# Patient Record
Sex: MALE | Race: WHITE | NOT HISPANIC OR LATINO | Employment: OTHER | URBAN - METROPOLITAN AREA
[De-identification: names, ages, dates, MRNs, and addresses within clinical notes are randomized per-mention and may not be internally consistent; named-entity substitution may affect disease eponyms.]

---

## 2017-05-12 ENCOUNTER — OFFICE VISIT (OUTPATIENT)
Dept: FAMILY MEDICINE | Facility: CLINIC | Age: 62
End: 2017-05-12
Payer: COMMERCIAL

## 2017-05-12 VITALS
SYSTOLIC BLOOD PRESSURE: 182 MMHG | WEIGHT: 194.5 LBS | HEART RATE: 66 BPM | DIASTOLIC BLOOD PRESSURE: 100 MMHG | TEMPERATURE: 98.6 F | BODY MASS INDEX: 26.34 KG/M2 | HEIGHT: 72 IN | OXYGEN SATURATION: 100 %

## 2017-05-12 DIAGNOSIS — Z11.59 NEED FOR HEPATITIS C SCREENING TEST: ICD-10-CM

## 2017-05-12 DIAGNOSIS — Z13.220 SCREENING FOR HYPERLIPIDEMIA: ICD-10-CM

## 2017-05-12 DIAGNOSIS — R97.20 ELEVATED PROSTATE SPECIFIC ANTIGEN (PSA): ICD-10-CM

## 2017-05-12 DIAGNOSIS — Z12.5 SPECIAL SCREENING FOR MALIGNANT NEOPLASM OF PROSTATE: ICD-10-CM

## 2017-05-12 DIAGNOSIS — Z00.00 ROUTINE GENERAL MEDICAL EXAMINATION AT A HEALTH CARE FACILITY: Primary | ICD-10-CM

## 2017-05-12 DIAGNOSIS — I10 ESSENTIAL HYPERTENSION WITH GOAL BLOOD PRESSURE LESS THAN 140/90: ICD-10-CM

## 2017-05-12 LAB
ANION GAP SERPL CALCULATED.3IONS-SCNC: 9 MMOL/L (ref 3–14)
BUN SERPL-MCNC: 13 MG/DL (ref 7–30)
CALCIUM SERPL-MCNC: 8.9 MG/DL (ref 8.5–10.1)
CHLORIDE SERPL-SCNC: 100 MMOL/L (ref 94–109)
CHOLEST SERPL-MCNC: 181 MG/DL
CO2 SERPL-SCNC: 24 MMOL/L (ref 20–32)
CREAT SERPL-MCNC: 0.9 MG/DL (ref 0.66–1.25)
GFR SERPL CREATININE-BSD FRML MDRD: 86 ML/MIN/1.7M2
GLUCOSE SERPL-MCNC: 113 MG/DL (ref 70–99)
HDLC SERPL-MCNC: 51 MG/DL
LDLC SERPL CALC-MCNC: 104 MG/DL
NONHDLC SERPL-MCNC: 130 MG/DL
POTASSIUM SERPL-SCNC: 3.9 MMOL/L (ref 3.4–5.3)
PSA SERPL-ACNC: 5.59 UG/L (ref 0–4)
SODIUM SERPL-SCNC: 133 MMOL/L (ref 133–144)
TRIGL SERPL-MCNC: 132 MG/DL

## 2017-05-12 PROCEDURE — 86803 HEPATITIS C AB TEST: CPT | Performed by: FAMILY MEDICINE

## 2017-05-12 PROCEDURE — 99396 PREV VISIT EST AGE 40-64: CPT | Performed by: FAMILY MEDICINE

## 2017-05-12 PROCEDURE — 80048 BASIC METABOLIC PNL TOTAL CA: CPT | Performed by: FAMILY MEDICINE

## 2017-05-12 PROCEDURE — 36415 COLL VENOUS BLD VENIPUNCTURE: CPT | Performed by: FAMILY MEDICINE

## 2017-05-12 PROCEDURE — 80061 LIPID PANEL: CPT | Performed by: FAMILY MEDICINE

## 2017-05-12 PROCEDURE — G0103 PSA SCREENING: HCPCS | Performed by: FAMILY MEDICINE

## 2017-05-12 PROCEDURE — 99213 OFFICE O/P EST LOW 20 MIN: CPT | Mod: 25 | Performed by: FAMILY MEDICINE

## 2017-05-12 RX ORDER — ATENOLOL 100 MG/1
100 TABLET ORAL DAILY
Qty: 90 TABLET | Refills: 1 | Status: SHIPPED | OUTPATIENT
Start: 2017-05-12 | End: 2017-11-08

## 2017-05-12 RX ORDER — LISINOPRIL AND HYDROCHLOROTHIAZIDE 12.5; 2 MG/1; MG/1
2 TABLET ORAL EVERY MORNING
Qty: 180 TABLET | Refills: 1 | Status: SHIPPED | OUTPATIENT
Start: 2017-05-12 | End: 2017-11-08

## 2017-05-12 RX ORDER — AMLODIPINE BESYLATE 5 MG/1
5 TABLET ORAL DAILY
Qty: 30 TABLET | Refills: 1 | Status: SHIPPED | OUTPATIENT
Start: 2017-05-12 | End: 2017-07-09

## 2017-05-12 NOTE — PROGRESS NOTES
SUBJECTIVE:     CC: Dion Bowman is an 61 year old male who presents for preventative health visit.     Physical   Annual:     Getting at least 3 servings of Calcium per day::  Yes    Bi-annual eye exam::  Yes    Dental care twice a year::  Yes    Sleep apnea or symptoms of sleep apnea::  None    Diet::  Regular (no restrictions)    Frequency of exercise::  6-7 days/week    Duration of exercise::  30-45 minutes    Taking medications regularly::  Yes    Medication side effects::  None    Additional concerns today::  YES (measles question, should he get vaccinated?)  and colonoscopy referral and would like to discuss 24 hour blood pressure from last fall.     Today's PHQ-2 Score:   PHQ-2 ( 1999 Pfizer) 5/12/2017   Q1: Little interest or pleasure in doing things -   Q2: Feeling down, depressed or hopeless -   PHQ-2 Score -   Little interest or pleasure in doing things Not at all   Feeling down, depressed or hopeless Not at all   PHQ-2 Score 0     Abuse: Current or Past(Physical, Sexual or Emotional)- No  Do you feel safe in your environment - Yes    Social History   Substance Use Topics     Smoking status: Never Smoker     Smokeless tobacco: Never Used     Alcohol use Yes      Comment: 1 drink every two weeks. During Football Season     The patient does not drink >3 drinks per day nor >7 drinks per week.    Last PSA:   PSA   Date Value Ref Range Status   12/20/2013 3.01 0 - 4 ug/L Final     Recent Labs   Lab Test  12/18/15   0856  12/23/14   1218  12/20/13   0833   CHOL  187  196  195   HDL  50  52  40   LDL  111*  111  115   TRIG  132  163*  199*   CHOLHDLRATIO   --   3.8  4.8   NHDL  137*   --    --      Reviewed orders with patient. Reviewed health maintenance and updated orders accordingly - Yes    Reviewed and updated as needed this visit by clinical staff.     Reviewed and updated as needed this visit by Provider    Had ambulatory blood pressure monitoring. It was expensive. When he used it, he was constantly  "aware of it and he feels it may have raised his BP too.     ROS:  C: NEGATIVE for fever, chills, change in weight  I: NEGATIVE for worrisome rashes, moles or lesions  E: NEGATIVE for vision changes or irritation  ENT: NEGATIVE for ear, mouth and throat problems  R: NEGATIVE for significant cough or SOB  CV: NEGATIVE for chest pain, palpitations or peripheral edema  GI: NEGATIVE for nausea, abdominal pain, heartburn, or change in bowel habits   male: negative for dysuria, hematuria, decreased urinary stream, erectile dysfunction, urethral discharge  M: NEGATIVE for significant arthralgias or myalgia  N: NEGATIVE for weakness, dizziness or paresthesias  E: NEGATIVE for temperature intolerance, skin/hair changes  P: NEGATIVE for changes in mood or affect    Problem list, Medication list, Allergies, and Medical/Social/Surgical histories reviewed in EPIC and updated as appropriate.  OBJECTIVE:     BP (!) 182/100  Pulse 66  Temp 98.6  F (37  C) (Oral)  Ht 5' 11.5\" (1.816 m)  Wt 194 lb 8 oz (88.2 kg)  SpO2 100%  BMI 26.75 kg/m2  EXAM:  GENERAL: healthy, alert and no distress  EYES: Eyes grossly normal to inspection, PERRL and conjunctivae and sclerae normal  HENT: ear canals and TM's normal, nose and mouth without ulcers or lesions  NECK: no adenopathy, no asymmetry, masses, or scars and thyroid normal to palpation  RESP: lungs clear to auscultation - no rales, rhonchi or wheezes  CV: regular rate and rhythm, normal S1 S2, no S3 or S4, no murmur, click or rub, no peripheral edema and peripheral pulses strong  ABDOMEN: soft, nontender, no hepatosplenomegaly, no masses and bowel sounds normal   (male): normal male genitalia without lesions or urethral discharge, no hernia  MS: no gross musculoskeletal defects noted, no edema  SKIN: no suspicious lesions or rashes  NEURO: Normal strength and tone, mentation intact and speech normal  PSYCH: mentation appears normal, affect normal/bright     ASSESSMENT/PLAN:     1. " "Routine general medical examination at a health care facility       2. Essential hypertension with goal blood pressure less than 140/90  Above goal. Ambulatory bp also showed elevated bp. We agreed to add amlodipine. Side effects discussed. On max dose of lisinpril-hctz.   - lisinopril-hydrochlorothiazide (PRINZIDE/ZESTORETIC) 20-12.5 MG per tablet; Take 2 tablets by mouth every morning  Dispense: 180 tablet; Refill: 1  - atenolol (TENORMIN) 100 MG tablet; Take 1 tablet (100 mg) by mouth daily  Dispense: 90 tablet; Refill: 1  - amLODIPine (NORVASC) 5 MG tablet; Take 1 tablet (5 mg) by mouth daily  Dispense: 30 tablet; Refill: 1  - Basic metabolic panel    3. Need for hepatitis C screening test     - Hepatitis C Screen Reflex to HCV RNA Quant and Genotype    4. Screening for hyperlipidemia     - LIPID REFLEX TO DIRECT LDL PANEL    5. Special screening for malignant neoplasm of prostate     - PSA, screen    6. Elevated prostate specific antigen (PSA)     - UROLOGY ADULT REFERRAL    COUNSELING:   Reviewed preventive health counseling, as reflected in patient instructions  Special attention given to:        Regular exercise       Healthy diet/nutrition       Vision screening       Hearing screening       Colon cancer screening       Prostate cancer screening         reports that he has never smoked. He has never used smokeless tobacco.    Estimated body mass index is 26.06 kg/(m^2) as calculated from the following:    Height as of 9/9/16: 5' 11.5\" (1.816 m).    Weight as of 9/9/16: 189 lb 8 oz (86 kg).   Weight management plan: Discussed healthy diet and exercise guidelines and patient will follow up in 12 months in clinic to re-evaluate.    Counseling Resources:  ATP IV Guidelines  Pooled Cohorts Equation Calculator  FRAX Risk Assessment  ICSI Preventive Guidelines  Dietary Guidelines for Americans, 2010  USDA's MyPlate  ASA Prophylaxis  Lung CA Screening    Chance Guzmán MD  [facility] " "TED  Answers for HPI/ROS submitted by the patient on 5/12/2017   Q1: Little interest or pleasure in doing things: 0=Not at all  Q2: Feeling down, depressed or hopeless: 0=Not at all  PHQ-2 Score: 0      - follow up in a month with RN for \" nurse only bp check\" - if above 140/90 - start taking 2 of amlodipine and I will send new prescription of 10mg.   See me back in another month from increasing dose.     IF bp is stable with 5mg - see me back in 6 months.   "

## 2017-05-12 NOTE — MR AVS SNAPSHOT
After Visit Summary   5/12/2017    Dion Bowman    MRN: [id number]           Patient Information     Date Of Birth          1955        Visit Information        Provider Department      5/12/2017 7:40 AM Chance Guzmán MD [facility]        Today's Diagnoses     Routine general medical examination at a health care facility    -  1    Essential hypertension with goal blood pressure less than 140/90        Need for hepatitis C screening test        Screening for hyperlipidemia        Special screening for malignant neoplasm of prostate          Care Instructions      Preventive Health Recommendations  Male Ages 50 - 64    Yearly exam:             See your health care provider every year in order to  o   Review health changes.   o   Discuss preventive care.    o   Review your medicines if your doctor has prescribed any.     Have a cholesterol test every 5 years, or more frequently if you are at risk for high cholesterol/heart disease.     Have a diabetes test (fasting glucose) every three years. If you are at risk for diabetes, you should have this test more often.     Have a colonoscopy at age 50, or have a yearly FIT test (stool test). These exams will check for colon cancer.      Talk with your health care provider about whether or not a prostate cancer screening test (PSA) is right for you.    You should be tested each year for STDs (sexually transmitted diseases), if you re at risk.     Shots: Get a flu shot each year. Get a tetanus shot every 10 years.     Nutrition:    Eat at least 5 servings of fruits and vegetables daily.     Eat whole-grain bread, whole-wheat pasta and brown rice instead of white grains and rice.     Talk to your provider about Calcium and Vitamin D.     Lifestyle    Exercise for at least 150 minutes a week (30 minutes a day, 5 days a week). This will help you control your weight and prevent disease.     Limit alcohol to one drink per day.     No  "smoking.     Wear sunscreen to prevent skin cancer.     See your dentist every six months for an exam and cleaning.     See your eye doctor every 1 to 2 years.    - follow up in a month with RN for \" nurse only bp check\" - if above 140/90 - start taking 2 of amlodipine and I will send new prescription of 10mg.   See me back in another month from increasing dose.     IF bp is stable with 5mg - see me back in 6 months.         Follow-ups after your visit        Who to contact     If you have questions or need follow up information about today's clinic visit or your schedule please contact [facility] directly at [phone].  Normal or non-critical lab and imaging results will be communicated to you by [employer], letter or phone within 4 business days after the clinic has received the results. If you do not hear from us within 7 days, please contact the clinic through [employer] or phone. If you have a critical or abnormal lab result, we will notify you by phone as soon as possible.  Submit refill requests through [employer] or call your pharmacy and they will forward the refill request to us. Please allow 3 business days for your refill to be completed.          Additional Information About Your Visit        [employer] Information     [employer] gives you secure access to your electronic health record. If you see a primary care provider, you can also send messages to your care team and make appointments. If you have questions, please call your primary care clinic.  If you do not have a primary care provider, please call [phone] and they will assist you.        Care EveryWhere ID     This is your Care EveryWhere ID. This could be used by other organizations to access your [facility] medical records  [id number]        Your Vitals Were     Pulse Temperature Height Pulse Oximetry BMI (Body Mass Index)       66 98.6  F (37  C) (Oral) 5' 11.5\" (1.816 m) 100% 26.75 kg/m2        Blood Pressure from Last 3 Encounters: "   05/12/17 (!) 182/100   09/09/16 (!) 136/94   08/01/16 (!) 150/100    Weight from Last 3 Encounters:   05/12/17 194 lb 8 oz (88.2 kg)   09/09/16 189 lb 8 oz (86 kg)   04/18/16 192 lb 12 oz (87.4 kg)              We Performed the Following     Basic metabolic panel     Hepatitis C Screen Reflex to HCV RNA Quant and Genotype     LIPID REFLEX TO DIRECT LDL PANEL     PSA, screen          Today's Medication Changes          These changes are accurate as of: 5/12/17  8:15 AM.  If you have any questions, ask your nurse or doctor.               Start taking these medicines.        Dose/Directions    amLODIPine 5 MG tablet   Commonly known as:  NORVASC   Used for:  Essential hypertension with goal blood pressure less than 140/90   Started by:  Chance Guzmán MD        Dose:  5 mg   Take 1 tablet (5 mg) by mouth daily   Quantity:  30 tablet   Refills:  1            Where to get your medicines      These medications were sent to [place] [id number] [place] [address] [address]     Phone:  [phone]     amLODIPine 5 MG tablet    atenolol 100 MG tablet    lisinopril-hydrochlorothiazide 20-12.5 MG per tablet                Primary Care Provider Office Phone # Fax #    Chance Guzmán [phone]       [address] [address]        Thank you!     Thank you for choosing [facility]  for your care. Our goal is always to provide you with excellent care. Hearing back from our patients is one way we can continue to improve our services. Please take a few minutes to complete the written survey that you may receive in the mail after your visit with us. Thank you!             Your Updated Medication List - Protect others around you: Learn how to safely use, store and throw away your medicines at www.disposemymeds.org.          This list is accurate as of: 5/12/17  8:15 AM.  Always use your most  recent med list.                   Brand Name Dispense Instructions for use    amLODIPine 5 MG tablet    NORVASC    30 tablet    Take 1 tablet (5 mg) by mouth daily       aspirin 81 MG tablet     100    ONE DAILY       atenolol 100 MG tablet    TENORMIN    90 tablet    Take 1 tablet (100 mg) by mouth daily       Fish Oil 1200 MG Caps      2 caps every other day       lisinopril-hydrochlorothiazide 20-12.5 MG per tablet    PRINZIDE/ZESTORETIC    180 tablet    Take 2 tablets by mouth every morning       VITAMIN D (CHOLECALCIFEROL) PO      Take 1,000 Units by mouth daily

## 2017-05-12 NOTE — PATIENT INSTRUCTIONS
"  Preventive Health Recommendations  Male Ages 50   64    Yearly exam:             See your health care provider every year in order to  o   Review health changes.   o   Discuss preventive care.    o   Review your medicines if your doctor has prescribed any.     Have a cholesterol test every 5 years, or more frequently if you are at risk for high cholesterol/heart disease.     Have a diabetes test (fasting glucose) every three years. If you are at risk for diabetes, you should have this test more often.     Have a colonoscopy at age 50, or have a yearly FIT test (stool test). These exams will check for colon cancer.      Talk with your health care provider about whether or not a prostate cancer screening test (PSA) is right for you.    You should be tested each year for STDs (sexually transmitted diseases), if you re at risk.     Shots: Get a flu shot each year. Get a tetanus shot every 10 years.     Nutrition:    Eat at least 5 servings of fruits and vegetables daily.     Eat whole-grain bread, whole-wheat pasta and brown rice instead of white grains and rice.     Talk to your provider about Calcium and Vitamin D.     Lifestyle    Exercise for at least 150 minutes a week (30 minutes a day, 5 days a week). This will help you control your weight and prevent disease.     Limit alcohol to one drink per day.     No smoking.     Wear sunscreen to prevent skin cancer.     See your dentist every six months for an exam and cleaning.     See your eye doctor every 1 to 2 years.    - follow up in a month with RN for \" nurse only bp check\" - if above 140/90 - start taking 2 of amlodipine and I will send new prescription of 10mg.   See me back in another month from increasing dose.     IF bp is stable with 5mg - see me back in 6 months.   "

## 2017-05-14 LAB — HCV AB SERPL QL IA: NORMAL

## 2017-06-28 ENCOUNTER — TELEPHONE (OUTPATIENT)
Dept: FAMILY MEDICINE | Facility: CLINIC | Age: 62
End: 2017-06-28

## 2017-06-28 NOTE — TELEPHONE ENCOUNTER
Has been out of town. Will call back to schedule MA only appointment.  Please schedule.    Thanks,  Oksana Wyatt

## 2017-07-09 DIAGNOSIS — I10 ESSENTIAL HYPERTENSION WITH GOAL BLOOD PRESSURE LESS THAN 140/90: ICD-10-CM

## 2017-07-10 RX ORDER — AMLODIPINE BESYLATE 5 MG/1
TABLET ORAL
Qty: 30 TABLET | Refills: 1 | Status: SHIPPED | OUTPATIENT
Start: 2017-07-10 | End: 2017-08-01

## 2017-07-10 NOTE — TELEPHONE ENCOUNTER
PT has not made a bp check. He said he would when back in town.  5/12/17 ov did not have plan.  Routing bp med refill to pcp.  NIKITA Terrazas

## 2017-07-10 NOTE — TELEPHONE ENCOUNTER
Medication Detail      Disp Refills Start End RONA   amLODIPine (NORVASC) 5 MG tablet 30 tablet 1 5/12/2017  --   Sig: Take 1 tablet (5 mg) by mouth daily        Last Office Visit with [facility], [facility] or [facility] prescribing provider:  5/12/17   Future Office Visit:        BP Readings from Last 3 Encounters:   05/12/17 (!) 182/100   09/09/16 (!) 136/94   08/01/16 (!) 150/100

## 2017-07-28 ENCOUNTER — TELEPHONE (OUTPATIENT)
Dept: FAMILY MEDICINE | Facility: CLINIC | Age: 62
End: 2017-07-28

## 2017-07-28 NOTE — TELEPHONE ENCOUNTER
LVM to schedule MA appointment for BP checked. No showed his 6/9/17 appointment.  Please schedule if/when he calls back.    Thank you,  Oksana Wyatt

## 2017-08-01 DIAGNOSIS — I10 ESSENTIAL HYPERTENSION WITH GOAL BLOOD PRESSURE LESS THAN 140/90: ICD-10-CM

## 2017-08-01 NOTE — TELEPHONE ENCOUNTER
90 DAY SUPPLY REQUESTED.   amLODIPine (NORVASC) 5 MG tablet      Last Written Prescription Date: 7/10/17  Last Fill Quantity: 30, # refills: 1    Last Office Visit with [facility], [facility] or [facility] prescribing provider:  5/12/17   Future Office Visit:        BP Readings from Last 3 Encounters:   05/12/17 (!) 182/100   09/09/16 (!) 136/94   08/01/16 (!) 150/100

## 2017-08-02 RX ORDER — AMLODIPINE BESYLATE 5 MG/1
5 TABLET ORAL DAILY
Qty: 0.01 TABLET | Refills: 0 | Status: SHIPPED | OUTPATIENT
Start: 2017-08-02 | End: 2017-08-02

## 2017-08-02 RX ORDER — AMLODIPINE BESYLATE 5 MG/1
5 TABLET ORAL DAILY
Qty: 90 TABLET | Refills: 0 | Status: SHIPPED | OUTPATIENT
Start: 2017-08-02 | End: 2018-01-10

## 2017-08-02 NOTE — TELEPHONE ENCOUNTER
Routing refill request to provider for review/approval because:  To early for refill - patient has refill on file and has not followed up with return visit RN BP check    Routing to  Reception - please advise BP check appointment    Thank you,  Jhonny Bacon RN

## 2017-08-02 NOTE — TELEPHONE ENCOUNTER
Writer notes denial for Amlodipine sent in today.    Dr. Guzmán-Please review and advise/sign if agree.    Thank you!  TOYA Garcia, TANMAYN, RN

## 2017-08-03 NOTE — TELEPHONE ENCOUNTER
Spoke with patient and he made his appointment for his BP medication said this medication he stopped taking so he needs to be seen he did not like how it was making him feelk

## 2017-08-03 NOTE — TELEPHONE ENCOUNTER
Dr. Guzmán-writer wanted to make you aware patient reported discontinuation of Amlodipine.  Patient has appt with you on 8/11/17.    Thank you!  TANMAY TorresN, RN

## 2017-08-09 NOTE — PROGRESS NOTES
SUBJECTIVE:                                                    Dion Bowman is a 62 year old male who presents to clinic today for the following health issues:      Hypertension Follow-up      Outpatient blood pressures are being checked at home.  Results are 150/100's.    Low Salt Diet: low salt      Amount of exercise or physical activity: 6-7 days/week for an average of 45-60 minutes    Problems taking medications regularly: yes    Medication side effects:NORVASC- hand swelling      Diet: regular (no restrictions).     PROBLEMS TO ADD ON...pt will discus with PCP.     Amlodipine - had some hand swelling. Tingling. Stopped taking it.     Right sided lower back pain after near fall. No nausea, vomiting, kidney trouble.     Some low back pain. Biking. No radiation of pain.     Scalp - bald spot and has a mole on that.     Problem list and histories reviewed & adjusted, as indicated.  Additional history: as documented    Labs reviewed in EPIC.     Reviewed and updated as needed this visit by clinical staff     Reviewed and updated as needed this visit by Provider           Social History     Social History     Marital status:      Spouse name: Celi     Number of children: 2     Years of education: 16     Occupational History           [employer]      [employer]     Social History Main Topics     Smoking status: Never Smoker     Smokeless tobacco: Never Used     Alcohol use Yes      Comment: 1 drink every two weeks. During Football Season     Drug use: No     Sexual activity: Yes     Partners: Female     Other Topics Concern      Service No     Blood Transfusions No     Caffeine Concern No     Ice Tea - 2 glasses a day     Occupational Exposure No     Hobby Hazards No     Sleep Concern No     Stress Concern No     Weight Concern No     Special Diet No     Back Care No     Exercise Yes     Stair Master, Ellipitical, weights - 6 days a week     Bike Helmet No     Seat Belt  Yes     Self-Exams Yes     Parent/Sibling W/ Cabg, Mi Or Angioplasty Before 65f 55m? No     Social History Narrative    Balanced Diet - Yes    Osteoporosis Preventative measures-  Dairy servings per day: 2-3 servings daily    Regular Exercise -  Yes Describe stair master, walking, biking    Dental Exam up - YES - Date: 2007    Eye Exam - YES - Date: 2005    Self Testicular Exam -  No    Do you have any concerns about STD's -  No    Abuse: Current or Past (Physical, Sexual or Emotional)- No    Do you feel safe in your environment - Yes    Guns stored in the home - No    Sunscreen used - No    Seatbelts used - Yes    Lipids - YES - Date: 12-12-06    Glucose -  YES - Date: 12-12-06    Colon Cancer Screening - No    Hemoccults - NO    PSA - YES - Date: 12-12-06    Digital Rectal Exam - NO    Immunizations reviewed and up to date - Yes, last TD was 9-14-04    Tiffanie Moralez MA     Allergies   Allergen Reactions     No Known Drug Allergies      Patient Active Problem List   Diagnosis     Hypertension goal BP (blood pressure) < 140/90     CARDIOVASCULAR SCREENING; LDL GOAL LESS THAN 130     Impaired fasting blood sugar     Reviewed medications, social history and  past medical and surgical history.    Review of system: for general, respiratory, CVS, GI and psychiatry negative except for noted above.     EXAM:  BP (!) 183/101 (BP Location: Left arm, Patient Position: Chair, Cuff Size: Adult Regular)  Pulse 85  Temp 98.4  F (36.9  C) (Oral)  Wt 190 lb 12 oz (86.5 kg)  SpO2 98%  BMI 26.23 kg/m2  Constitutional: healthy, alert and no distress   Psychiatric: mentation appears normal and affect normal/bright  Cardiovascular: RRR. No murmurs,  Respiratory: negative, Lungs clear. No crackles or wheezing. No tachypnea.   Skin - bald spot on scalp and around 1.5 cm x 1.5 cm irregular shaped skin lesion present.   Back - minimal si joint and midline tenderness, right lateral lower back reported mild tenderness but ROM not  restricted, straight leg negative.     ASSESSMENT / PLAN:  (I10) Hypertension goal BP (blood pressure) < 140/90  (primary encounter diagnosis)  Comment: understandably upset that his bp is not improving despite great lifestyle and healthy diet. Discussed it can be frustrating. He is on max dose of hctz, lisinopril and atenolol. Did not tolerate amlodipine but willing to reconsider. Discussed trial of 1/2 pill twice per day and see how he does.   - we also talked about talking to preventative cardiology due to his high bp despite his best efforts and high ASCVD risk. He is interested in learning more about it. Referral given.   Plan: CARDIOLOGY EVAL ADULT REFERRAL,            (L98.9) Skin lesion  Comment:  On scalp  Plan: derm referral for evaluation and biopsy. DERMATOLOGY         REFERRAL      (M54.5) Acute midline low back pain without sciatica  Comment:    Plan: does not want or need any major intervention. Heat and supportive treatment. If getting worse, follow up.     Follow up in 3 months.

## 2017-08-11 ENCOUNTER — OFFICE VISIT (OUTPATIENT)
Dept: FAMILY MEDICINE | Facility: CLINIC | Age: 62
End: 2017-08-11
Payer: COMMERCIAL

## 2017-08-11 VITALS
WEIGHT: 190.75 LBS | TEMPERATURE: 98.4 F | OXYGEN SATURATION: 98 % | HEART RATE: 85 BPM | BODY MASS INDEX: 26.23 KG/M2 | DIASTOLIC BLOOD PRESSURE: 101 MMHG | SYSTOLIC BLOOD PRESSURE: 183 MMHG

## 2017-08-11 DIAGNOSIS — M54.50 ACUTE MIDLINE LOW BACK PAIN WITHOUT SCIATICA: ICD-10-CM

## 2017-08-11 DIAGNOSIS — I10 HYPERTENSION GOAL BP (BLOOD PRESSURE) < 140/90: Primary | ICD-10-CM

## 2017-08-11 DIAGNOSIS — L98.9 SKIN LESION: ICD-10-CM

## 2017-08-11 PROCEDURE — 99214 OFFICE O/P EST MOD 30 MIN: CPT | Performed by: FAMILY MEDICINE

## 2017-08-11 NOTE — NURSING NOTE
"Chief Complaint   Patient presents with     Hypertension       Initial BP (!) 183/101 (BP Location: Left arm, Patient Position: Chair, Cuff Size: Adult Regular)  Pulse 85  Temp 98.4  F (36.9  C) (Oral)  Wt 190 lb 12 oz (86.5 kg)  SpO2 98%  BMI 26.23 kg/m2 Estimated body mass index is 26.23 kg/(m^2) as calculated from the following:    Height as of 5/12/17: 5' 11.5\" (1.816 m).    Weight as of this encounter: 190 lb 12 oz (86.5 kg).  Medication Reconciliation: complete Tobin Awan MA      "

## 2017-08-11 NOTE — MR AVS SNAPSHOT
After Visit Summary   8/11/2017    Dion Bowman    MRN: [id number]           Patient Information     Date Of Birth          1955        Visit Information        Provider Department      8/11/2017 8:00 AM Chance Guzmán MD [facility]        Today's Diagnoses     Hypertension goal BP (blood pressure) < 140/90    -  1    Skin lesion        Acute midline low back pain without sciatica           Follow-ups after your visit        Additional Services     CARDIOLOGY EVAL ADULT REFERRAL       Your provider has referred you to:  [facility]: [facility] [phone] [email or web address]    Please be aware that coverage of these services is subject to the terms and limitations of your health insurance plan.  Call member services at your health plan with any benefit or coverage questions.      Type of Referral:  Heart Prevention Screening ([facility])    Timeframe requested:  Within 1 month    Please bring the following to your appointment:  >>   Any x-rays, CTs or MRIs which have been performed.  Contact the facility where they were done to arrange for  prior to your scheduled appointment.    >>   List of current medications  >>   This referral request   >>   Any documents/labs given to you for this referral                  Who to contact     If you have questions or need follow up information about today's clinic visit or your schedule please contact [facility] directly at [phone].  Normal or non-critical lab and imaging results will be communicated to you by MyChart, letter or phone within 4 business days after the clinic has received the results. If you do not hear from us within 7 days, please contact the clinic through MyChart or phone. If you have a critical or abnormal lab result, we will notify you by phone as soon as possible.  Submit refill  requests through [employer] or call your pharmacy and they will forward the refill request to us. Please allow 3 business days for your refill to be completed.          Additional Information About Your Visit        SIV[employer]hart Information     [employer] gives you secure access to your electronic health record. If you see a primary care provider, you can also send messages to your care team and make appointments. If you have questions, please call your primary care clinic.  If you do not have a primary care provider, please call [phone] and they will assist you.        Care EveryWhere ID     This is your Care EveryWhere ID. This could be used by other organizations to access your [facility] medical records  [id number]        Your Vitals Were     Pulse Temperature Pulse Oximetry BMI (Body Mass Index)          85 98.4  F (36.9  C) (Oral) 98% 26.23 kg/m2         Blood Pressure from Last 3 Encounters:   08/11/17 (!) 183/101   05/12/17 (!) 182/100   09/09/16 (!) 136/94    Weight from Last 3 Encounters:   08/11/17 190 lb 12 oz (86.5 kg)   05/12/17 194 lb 8 oz (88.2 kg)   09/09/16 189 lb 8 oz (86 kg)              We Performed the Following     CARDIOLOGY EVAL ADULT REFERRAL        Primary Care Provider Office Phone # Fax #    Chance Terra Guzmán [phone] [address] [place] [address]        Equal Access to Services     [facility] : Hadii kari messer hadaileeno Sojanine, waaxda luqadaha, qaybta kaalmada james, bita sharma . So [facility] [phone].    ATENCIÓN: Si habla español, tiene a lemus disposición servicios gratuitos de asistencia lingüística. Llame al [phone].    We comply with applicable federal civil rights laws and [place] laws. We do not discriminate on the basis of race, color, national origin, age, disability sex, sexual orientation or gender identity.            Thank you!     Thank you for choosing [facility]  for your care. Our  goal is always to provide you with excellent care. Hearing back from our patients is one way we can continue to improve our services. Please take a few minutes to complete the written survey that you may receive in the mail after your visit with us. Thank you!             Your Updated Medication List - Protect others around you: Learn how to safely use, store and throw away your medicines at www.disposemymeds.org.          This list is accurate as of: 8/11/17  8:22 AM.  Always use your most recent med list.                   Brand Name Dispense Instructions for use Diagnosis    amLODIPine 5 MG tablet    NORVASC    90 tablet    Take 1 tablet (5 mg) by mouth daily PLEASE SCHEDULE AN APPOINTMENT TO RECEIVE FUTURE REFILLS [phone]    Essential hypertension with goal blood pressure less than 140/90       aspirin 81 MG tablet     100    ONE DAILY    Essential hypertension, benign       atenolol 100 MG tablet    TENORMIN    90 tablet    Take 1 tablet (100 mg) by mouth daily    Essential hypertension with goal blood pressure less than 140/90       Fish Oil 1200 MG Caps      2 caps every other day        lisinopril-hydrochlorothiazide 20-12.5 MG per tablet    PRINZIDE/ZESTORETIC    180 tablet    Take 2 tablets by mouth every morning    Essential hypertension with goal blood pressure less than 140/90       VITAMIN D (CHOLECALCIFEROL) PO      Take 1,000 Units by mouth daily

## 2017-08-30 ENCOUNTER — TELEPHONE (OUTPATIENT)
Dept: FAMILY MEDICINE | Facility: CLINIC | Age: 62
End: 2017-08-30

## 2017-08-30 NOTE — TELEPHONE ENCOUNTER
LVM to schedule MA appointment for BP follow up or he can go to  pharmacy.  No charge to him for either.    Thanks,  Oksana Wyatt

## 2017-11-08 DIAGNOSIS — I10 ESSENTIAL HYPERTENSION WITH GOAL BLOOD PRESSURE LESS THAN 140/90: ICD-10-CM

## 2017-11-10 RX ORDER — ATENOLOL 100 MG/1
100 TABLET ORAL DAILY
Qty: 30 TABLET | Refills: 0 | Status: SHIPPED | OUTPATIENT
Start: 2017-11-10 | End: 2017-12-20

## 2017-11-10 NOTE — TELEPHONE ENCOUNTER
BP Readings from Last 3 Encounters:   08/11/17 (!) 183/101   05/12/17 (!) 182/100   09/09/16 (!) 136/94     Pt needs appt now. Noted on rx for reminder to pt. Atenolol refilled for 30 days only.     TC - please call pt to make appt. He did not respond to last message when it was just left as a voice mail on 8/30/17.     Denise Hoyos RN

## 2017-12-20 ENCOUNTER — ALLIED HEALTH/NURSE VISIT (OUTPATIENT)
Dept: NURSING | Facility: CLINIC | Age: 62
End: 2017-12-20
Payer: COMMERCIAL

## 2017-12-20 VITALS — DIASTOLIC BLOOD PRESSURE: 89 MMHG | HEART RATE: 85 BPM | OXYGEN SATURATION: 99 % | SYSTOLIC BLOOD PRESSURE: 163 MMHG

## 2017-12-20 DIAGNOSIS — I10 ESSENTIAL HYPERTENSION WITH GOAL BLOOD PRESSURE LESS THAN 140/90: ICD-10-CM

## 2017-12-20 DIAGNOSIS — I10 HTN (HYPERTENSION): Primary | ICD-10-CM

## 2017-12-20 PROCEDURE — 99207 ZZC NO CHARGE NURSE ONLY: CPT

## 2017-12-22 NOTE — TELEPHONE ENCOUNTER
Requested Prescriptions   Pending Prescriptions Disp Refills     lisinopril-hydrochlorothiazide (PRINZIDE/ZESTORETIC) 20-12.5 MG per tablet [Pharmacy Med Name: LISINOPRIL-HCTZ 20-12.5 MG TAB]  Last Written Prescription Date:  11/10/17  Last Fill Quantity: 60,  # refills: 0   Last Office Visit with [facility], [facility] or [facility] prescribing provider:  8/11/17   Future Office Visit: 1/10/18   Next 5 appointments (look out 90 days)     Ignacio 10, 2018  8:20 AM CST   SHORT with Chance Guzmán MD   [facility] ([facility])    [address] [place] [id number] [phone]                60 tablet 0     Sig: TAKE 2 TABLETS BY MOUTH EVERY MORNING    Diuretics (Including Combos) Protocol Failed    12/20/2017 11:41 AM       Failed - Blood pressure under 140/90    BP Readings from Last 3 Encounters:   12/20/17 163/89   08/11/17 (!) 183/101   05/12/17 (!) 182/100                Passed - Recent or future visit with authorizing provider's specialty    Patient had office visit in the last year or has a visit in the next 30 days with authorizing provider.  See chart review.              Passed - Patient is age 18 or older       Passed - Normal serum creatinine on file in past 12 months    Recent Labs   Lab Test  05/12/17   0825   CR  0.90             Passed - Normal serum potassium on file in past 12 months    Recent Labs   Lab Test  05/12/17   0825   POTASSIUM  3.9                   Passed - Normal serum sodium on file in past 12 months    Recent Labs   Lab Test  05/12/17   0825   NA  133

## 2017-12-23 RX ORDER — LISINOPRIL AND HYDROCHLOROTHIAZIDE 12.5; 2 MG/1; MG/1
TABLET ORAL
Qty: 60 TABLET | Refills: 0 | Status: SHIPPED | OUTPATIENT
Start: 2017-12-23 | End: 2018-01-10

## 2017-12-23 RX ORDER — ATENOLOL 100 MG/1
TABLET ORAL
Qty: 30 TABLET | Refills: 0 | Status: SHIPPED | OUTPATIENT
Start: 2017-12-23 | End: 2018-01-10

## 2018-01-10 ENCOUNTER — OFFICE VISIT (OUTPATIENT)
Dept: FAMILY MEDICINE | Facility: CLINIC | Age: 63
End: 2018-01-10
Payer: COMMERCIAL

## 2018-01-10 VITALS
TEMPERATURE: 97.8 F | BODY MASS INDEX: 28.14 KG/M2 | OXYGEN SATURATION: 99 % | HEART RATE: 73 BPM | WEIGHT: 201 LBS | HEIGHT: 71 IN | SYSTOLIC BLOOD PRESSURE: 161 MMHG | DIASTOLIC BLOOD PRESSURE: 95 MMHG | RESPIRATION RATE: 16 BRPM

## 2018-01-10 DIAGNOSIS — Z23 NEED FOR PROPHYLACTIC VACCINATION AND INOCULATION AGAINST INFLUENZA: ICD-10-CM

## 2018-01-10 DIAGNOSIS — I10 HYPERTENSION, UNCONTROLLED: Primary | ICD-10-CM

## 2018-01-10 DIAGNOSIS — I10 ESSENTIAL HYPERTENSION WITH GOAL BLOOD PRESSURE LESS THAN 140/90: ICD-10-CM

## 2018-01-10 PROCEDURE — 90471 IMMUNIZATION ADMIN: CPT | Performed by: FAMILY MEDICINE

## 2018-01-10 PROCEDURE — 99214 OFFICE O/P EST MOD 30 MIN: CPT | Mod: 25 | Performed by: FAMILY MEDICINE

## 2018-01-10 PROCEDURE — 90686 IIV4 VACC NO PRSV 0.5 ML IM: CPT | Performed by: FAMILY MEDICINE

## 2018-01-10 RX ORDER — ATENOLOL 100 MG/1
TABLET ORAL
Qty: 90 TABLET | Refills: 0 | Status: SHIPPED | OUTPATIENT
Start: 2018-01-10 | End: 2018-04-30

## 2018-01-10 RX ORDER — AMLODIPINE BESYLATE 5 MG/1
5 TABLET ORAL DAILY
Qty: 90 TABLET | Refills: 0 | Status: SHIPPED | OUTPATIENT
Start: 2018-01-10 | End: 2018-04-30

## 2018-01-10 RX ORDER — LISINOPRIL AND HYDROCHLOROTHIAZIDE 12.5; 2 MG/1; MG/1
TABLET ORAL
Qty: 180 TABLET | Refills: 0 | Status: SHIPPED | OUTPATIENT
Start: 2018-01-10 | End: 2018-04-30

## 2018-01-10 NOTE — PATIENT INSTRUCTIONS
Please call [phone] to schedule an appointment for renal ultrasound.    Call preventative cardiology for follow up.

## 2018-01-10 NOTE — MR AVS SNAPSHOT
After Visit Summary   1/10/2018    Dion Bowman    MRN: [id number]           Patient Information     Date Of Birth          1955        Visit Information        Provider Department      1/10/2018 8:20 AM Chance Guzmán MD [facility]        Today's Diagnoses     Hypertension, uncontrolled    -  1    Need for prophylactic vaccination and inoculation against influenza          Care Instructions    Please call [phone] to schedule an appointment for renal ultrasound.    Call preventative cardiology for follow up.             Follow-ups after your visit        Future tests that were ordered for you today     Open Future Orders        Priority Expected Expires Ordered    US Renal Complete w Doppler Complete Routine  1/10/2019 1/10/2018            Who to contact     If you have questions or need follow up information about today's clinic visit or your schedule please contact [facility] directly at [phone].  Normal or non-critical lab and imaging results will be communicated to you by [employer], letter or phone within 4 business days after the clinic has received the results. If you do not hear from us within 7 days, please contact the clinic through [employer] or phone. If you have a critical or abnormal lab result, we will notify you by phone as soon as possible.  Submit refill requests through [employer] or call your pharmacy and they will forward the refill request to us. Please allow 3 business days for your refill to be completed.          Additional Information About Your Visit        MyChart Information     [employer] gives you secure access to your electronic health record. If you see a primary care provider, you can also send messages to your care team and make appointments. If you have questions, please call your primary care clinic.  If you do not have a primary care provider, please call [phone] and they will assist you.        Care EveryWhere ID      "This is your Care EveryWhere ID. This could be used by other organizations to access your [facility] medical records  [id number]        Your Vitals Were     Pulse Temperature Respirations Height Pulse Oximetry BMI (Body Mass Index)    73 97.8  F (36.6  C) (Oral) 16 5' 11\" (1.803 m) 99% 28.03 kg/m2       Blood Pressure from Last 3 Encounters:   01/10/18 (!) 161/95   12/20/17 163/89   08/11/17 (!) 183/101    Weight from Last 3 Encounters:   01/10/18 201 lb (91.2 kg)   08/11/17 190 lb 12 oz (86.5 kg)   05/12/17 194 lb 8 oz (88.2 kg)              We Performed the Following          ADMIN VACCINE, FIRST [00977]     HC FLU VAC PRESRV FREE QUAD SPLIT VIR 3+YRS IM  [60132]        Primary Care Provider Office Phone # Fax #    Chance Terra Guzmán [phone] [address] [place], [place] [id number]        Equal Access to Services     [facility]: Hadii aad ku hadasho Soomaali, waaxda luqadaha, qaybta kaalmada adeegyaconcha, bita sharma . So [facility] [phone].    ATENCIÓN: Si habla español, tiene a lemus disposición servicios gratuitos de asistencia lingüística. Llame al [phone].    We comply with applicable federal civil rights laws and [place] laws. We do not discriminate on the basis of race, color, national origin, age, disability, sex, sexual orientation, or gender identity.            Thank you!     Thank you for choosing [facility]  for your care. Our goal is always to provide you with excellent care. Hearing back from our patients is one way we can continue to improve our services. Please take a few minutes to complete the written survey that you may receive in the mail after your visit with us. Thank you!             Your Updated Medication List - Protect others around you: Learn how to safely use, store and throw away your medicines at www.disposemymeds.org.          This list is accurate as of: 1/10/18  8:50 AM.  Always use your most recent med " list.                   Brand Name Dispense Instructions for use Diagnosis    amLODIPine 5 MG tablet    NORVASC    90 tablet    Take 1 tablet (5 mg) by mouth daily PLEASE SCHEDULE AN APPOINTMENT TO RECEIVE FUTURE REFILLS [phone]    Essential hypertension with goal blood pressure less than 140/90       aspirin 81 MG tablet     100    ONE DAILY    Essential hypertension, benign       atenolol 100 MG tablet    TENORMIN    30 tablet    TAKE 1 TABLET BY MOUTH ONCE DAILY ***PT NEEDS APPT NOW PER MD***    Essential hypertension with goal blood pressure less than 140/90       fish Oil 1200 MG capsule      2 caps every other day        lisinopril-hydrochlorothiazide 20-12.5 MG per tablet    PRINZIDE/ZESTORETIC    60 tablet    TAKE 2 TABLETS BY MOUTH EVERY MORNING    Essential hypertension with goal blood pressure less than 140/90       VITAMIN D (CHOLECALCIFEROL) PO      Take 1,000 Units by mouth daily

## 2018-01-10 NOTE — PROGRESS NOTES
SUBJECTIVE:   Dion Bowman is a 62 year old male who presents to clinic today for the following health issues:    Hypertension Follow-up      Outpatient blood pressures are not being checked.    Low Salt Diet: low salt    Amount of exercise or physical activity: 6-7 days/week for an average of 30-45 minutes    Problems taking medications regularly: No    Medication side effects: none    Diet: regular (no restrictions) and low salt    Problem list and histories reviewed & adjusted, as indicated.  Additional history: as documented    Labs reviewed in EPIC    Reviewed and updated as needed this visit by clinical staff     Reviewed and updated as needed this visit by Provider      Social History     Social History     Marital status:      Spouse name: Celi     Number of children: 2     Years of education: 16     Occupational History           [employer]      [employer]     Social History Main Topics     Smoking status: Never Smoker     Smokeless tobacco: Never Used     Alcohol use Yes      Comment: 1 drink every two weeks. During Football Season     Drug use: No     Sexual activity: Yes     Partners: Female     Other Topics Concern      Service No     Blood Transfusions No     Caffeine Concern No     Ice Tea - 2 glasses a day     Occupational Exposure No     Hobby Hazards No     Sleep Concern No     Stress Concern No     Weight Concern No     Special Diet No     Back Care No     Exercise Yes     Stair Master, Ellipitical, weights - 6 days a week     Bike Helmet No     Seat Belt Yes     Self-Exams Yes     Parent/Sibling W/ Cabg, Mi Or Angioplasty Before 65f 55m? No     Social History Narrative    Balanced Diet - Yes    Osteoporosis Preventative measures-  Dairy servings per day: 2-3 servings daily    Regular Exercise -  Yes Describe stair master, walking, biking    Dental Exam up - YES - Date: 2007    Eye Exam - YES - Date: 2005    Self Testicular Exam -  No    Do you have  "any concerns about STD's -  No    Abuse: Current or Past (Physical, Sexual or Emotional)- No    Do you feel safe in your environment - Yes    Guns stored in the home - No    Sunscreen used - No    Seatbelts used - Yes    Lipids - YES - Date: 12-12-06    Glucose -  YES - Date: 12-12-06    Colon Cancer Screening - No    Hemoccults - NO    PSA - YES - Date: 12-12-06    Digital Rectal Exam - NO    Immunizations reviewed and up to date - Yes, last TD was 9-14-04    Tiffanie Moralez MA     Allergies   Allergen Reactions     No Known Drug Allergies      Patient Active Problem List   Diagnosis     Hypertension goal BP (blood pressure) < 140/90     CARDIOVASCULAR SCREENING; LDL GOAL LESS THAN 130     Impaired fasting blood sugar     Reviewed medications, social history and  past medical and surgical history.    Review of system: for general, respiratory, CVS, GI and psychiatry negative except for noted above.     EXAM:  BP (!) 161/95  Pulse 73  Temp 97.8  F (36.6  C) (Oral)  Resp 16  Ht 5' 11\" (1.803 m)  Wt 201 lb (91.2 kg)  SpO2 99%  BMI 28.03 kg/m2  Constitutional: healthy, alert and no distress   Psychiatric: mentation appears normal and affect normal/bright  Cardiovascular: RRR. No murmurs,  Respiratory: negative, Lungs clear. No crackles or wheezing. No tachypnea.        ASSESSMENT / PLAN:   (I10) Hypertension, uncontrolled  (primary encounter diagnosis)  Comment: We should look but the secondary cause now.  We will obtain renal ultrasound.  If renal ultrasound is fine instead of obtaining further blood test we discussed seeing cardiology to get their input about medication adjustment.  He already has a referral from the last visit.  Plan: US Renal Complete w Doppler Complete              (I10) Essential hypertension with goal blood pressure less than 140/90  Comment: He ran out of amlodipine.  Now he is going to resume the same we previously talked about the higher dose but he did not tolerate the higher dose.  He " is on the max dose of atenolol, lisinopril and hydrochlorothiazide.  Plan: amLODIPine (NORVASC) 5 MG tablet, atenolol         (TENORMIN) 100 MG tablet,         lisinopril-hydrochlorothiazide         (PRINZIDE/ZESTORETIC) 20-12.5 MG per tablet             (Z23) Need for prophylactic vaccination and inoculation against influenza  Comment:     Plan: HC FLU VAC PRESRV FREE QUAD SPLIT VIR 3+YRS IM         [28988],      ADMIN VACCINE, FIRST [87091]           Follow-up in 3 months for blood pressure recheck.        Patient Instructions   Please call [phone] to schedule an appointment for renal ultrasound.    Call preventative cardiology for follow up.             Injectable Influenza Immunization Documentation    1.  Is the person to be vaccinated sick today?   No    2. Does the person to be vaccinated have an allergy to a component   of the vaccine?   No  Egg Allergy Algorithm Link    3. Has the person to be vaccinated ever had a serious reaction   to influenza vaccine in the past?   No    4. Has the person to be vaccinated ever had Guillain-Barré syndrome?   No    Form completed by Alvarado Awan MA

## 2018-01-22 DIAGNOSIS — I10 ESSENTIAL HYPERTENSION WITH GOAL BLOOD PRESSURE LESS THAN 140/90: ICD-10-CM

## 2018-01-24 NOTE — TELEPHONE ENCOUNTER
"Requested Prescriptions   Pending Prescriptions Disp Refills     lisinopril-hydrochlorothiazide (PRINZIDE/ZESTORETIC) 20-12.5 MG per tablet [Pharmacy Med Name: LISINOPRIL-HCTZ 20-12.5 MG TAB]  Last Written Prescription Date:  1/10/2018  Last Fill Quantity: 180 tablet,  # refills: 0   Last Office Visit: 1/10/2018   Future Office Visit:      60 tablet 0     Sig: TAKE 2 TABLETS BY MOUTH EVERY MORNING    Diuretics (Including Combos) Protocol Failed    1/22/2018  6:25 AM       Failed - Blood pressure under 140/90    BP Readings from Last 3 Encounters:   01/10/18 (!) 161/95   12/20/17 163/89   08/11/17 (!) 183/101          Passed - Recent or future visit with authorizing provider's specialty    Patient had office visit in the last year or has a visit in the next 30 days with authorizing provider.  See \"Patient Info\" tab in inbasket, or \"Choose Columns\" in Meds & Orders section of the refill encounter.          Passed - Patient is age 18 or older       Passed - Normal serum creatinine on file in past 12 months    Recent Labs   Lab Test  05/12/17   0825   CR  0.90           Passed - Normal serum potassium on file in past 12 months    Recent Labs   Lab Test  05/12/17   0825   POTASSIUM  3.9           Passed - Normal serum sodium on file in past 12 months    Recent Labs   Lab Test  05/12/17   0825   NA  133                "

## 2018-01-25 RX ORDER — LISINOPRIL AND HYDROCHLOROTHIAZIDE 12.5; 2 MG/1; MG/1
TABLET ORAL
Qty: 60 TABLET | Refills: 0 | OUTPATIENT
Start: 2018-01-25

## 2018-01-25 NOTE — TELEPHONE ENCOUNTER
Duplicate - order sent to same pharmacy 1/10/18 for three month supply.  Note sent to pharmacy.    Shaun SHELTON

## 2018-04-30 DIAGNOSIS — I10 ESSENTIAL HYPERTENSION WITH GOAL BLOOD PRESSURE LESS THAN 140/90: ICD-10-CM

## 2018-04-30 NOTE — TELEPHONE ENCOUNTER
"Requested Prescriptions   Pending Prescriptions Disp Refills     atenolol (TENORMIN) 100 MG tablet [Pharmacy Med Name: ATENOLOL 100 MG TABLET]  Last Written Prescription Date:  1/10/2018  Last Fill Quantity: 90 tablet,  # refills: 0   Last Office Visit: 1/10/2018   Future Office Visit:      90 tablet 0     Sig: TAKE 1 TABLET BY MOUTH ONCE DAILY    Beta-Blockers Protocol Failed    4/30/2018  6:51 AM       Failed - Blood pressure under 140/90 in past 12 months    BP Readings from Last 3 Encounters:   01/10/18 (!) 161/95 12/20/17 163/89 08/11/17 (!) 183/101          Passed - Patient is age 6 or older       Passed - Recent (12 mo) or future (30 days) visit within the authorizing provider's specialty    Patient had office visit in the last 12 months or has a visit in the next 30 days with authorizing provider or within the authorizing provider's specialty.  See \"Patient Info\" tab in inbasket, or \"Choose Columns\" in Meds & Orders section of the refill encounter.         _________________________________________________________________________________________________________________________       amLODIPine (NORVASC) 2.5 MG tablet [Pharmacy Med Name: AMLODIPINE BESYLATE 2.5 MG TAB]  Last Written Prescription Date:  1/10/2018  Last Fill Quantity: 90 tablet,  # refills: 0   Last Office Visit: 1/10/2018   Future Office Visit:      180 tablet 0     Sig: TAKE 2 TABLETS (5MG) BY MOUTH EVERY DAY    Calcium Channel Blockers Protocol  Failed    4/30/2018  6:51 AM       Failed - Blood pressure under 140/90 in past 12 months    BP Readings from Last 3 Encounters:   01/10/18 (!) 161/95 12/20/17 163/89 08/11/17 (!) 183/101          Passed - Recent (12 mo) or future (30 days) visit within the authorizing provider's specialty    Patient had office visit in the last 12 months or has a visit in the next 30 days with authorizing provider or within the authorizing provider's specialty.  See \"Patient Info\" tab in inbasket, or \"Choose " "Columns\" in Meds & Orders section of the refill encounter.           Passed - Patient is age 18 or older       Passed - Normal serum creatinine on file in past 12 months    Recent Labs   Lab Test  05/12/17   0825   CR  0.90        _________________________________________________________________________________________________________________________       lisinopril-hydrochlorothiazide (PRINZIDE/ZESTORETIC) 20-12.5 MG per tablet [Pharmacy Med Name: LISINOPRIL-HCTZ 20-12.5 MG TAB]  Last Written Prescription Date:  1/10/2018  Last Fill Quantity: 180 tablet,  # refills: 0   Last Office Visit: 1/10/2018   Future Office Visit:      180 tablet 0     Sig: TAKE 2 TABLETS BY MOUTH EVERY MORNING    Diuretics (Including Combos) Protocol Failed    4/30/2018  6:51 AM       Failed - Blood pressure under 140/90 in past 12 months    BP Readings from Last 3 Encounters:   01/10/18 (!) 161/95   12/20/17 163/89   08/11/17 (!) 183/101          Passed - Recent (12 mo) or future (30 days) visit within the authorizing provider's specialty    Patient had office visit in the last 12 months or has a visit in the next 30 days with authorizing provider or within the authorizing provider's specialty.  See \"Patient Info\" tab in inbasket, or \"Choose Columns\" in Meds & Orders section of the refill encounter.           Passed - Patient is age 18 or older       Passed - Normal serum creatinine on file in past 12 months    Recent Labs   Lab Test  05/12/17   0825   CR  0.90           Passed - Normal serum potassium on file in past 12 months    Recent Labs   Lab Test  05/12/17   0825   POTASSIUM  3.9           Passed - Normal serum sodium on file in past 12 months    Recent Labs   Lab Test  05/12/17   0825   NA  133                "

## 2018-05-04 RX ORDER — AMLODIPINE BESYLATE 2.5 MG/1
TABLET ORAL
Qty: 60 TABLET | Refills: 0 | Status: SHIPPED | OUTPATIENT
Start: 2018-05-04 | End: 2018-06-08

## 2018-05-04 RX ORDER — ATENOLOL 100 MG/1
TABLET ORAL
Qty: 30 TABLET | Refills: 0 | Status: SHIPPED | OUTPATIENT
Start: 2018-05-04 | End: 2018-05-29

## 2018-05-04 RX ORDER — LISINOPRIL AND HYDROCHLOROTHIAZIDE 12.5; 2 MG/1; MG/1
TABLET ORAL
Qty: 60 TABLET | Refills: 0 | Status: SHIPPED | OUTPATIENT
Start: 2018-05-04 | End: 2018-06-08

## 2018-05-04 NOTE — TELEPHONE ENCOUNTER
Blood pressure above range  Routing to provider per protocol.  1 month supply pended as patient was asked to RTC in 3 months  No appointment has been scheduled  Routing to scheduling also  Christine Kumar RN

## 2018-05-29 DIAGNOSIS — I10 ESSENTIAL HYPERTENSION WITH GOAL BLOOD PRESSURE LESS THAN 140/90: ICD-10-CM

## 2018-05-29 RX ORDER — ATENOLOL 100 MG/1
100 TABLET ORAL DAILY
Qty: 15 TABLET | Refills: 0 | Status: SHIPPED | OUTPATIENT
Start: 2018-05-29 | End: 2018-06-08

## 2018-05-29 NOTE — TELEPHONE ENCOUNTER
"Requested Prescriptions   Pending Prescriptions Disp Refills     atenolol (TENORMIN) 100 MG tablet 30 tablet 0     Sig: Take 1 tablet (100 mg) by mouth daily    Beta-Blockers Protocol Failed    5/29/2018 12:18 PM       Failed - Blood pressure under 140/90 in past 12 months    BP Readings from Last 3 Encounters:   01/10/18 (!) 161/95   12/20/17 163/89   08/11/17 (!) 183/101                Passed - Patient is age 6 or older       Passed - Recent (12 mo) or future (30 days) visit within the authorizing provider's specialty    Patient had office visit in the last 12 months or has a visit in the next 30 days with authorizing provider or within the authorizing provider's specialty.  See \"Patient Info\" tab in inbasket, or \"Choose Columns\" in Meds & Orders section of the refill encounter.              "

## 2018-05-29 NOTE — TELEPHONE ENCOUNTER
Request for medication refill:    Date of last visit at clinic: 1-10-18    Please complete refill if appropriate and CLOSE ENCOUNTER.    Closing the encounter signifies the refill is complete.    If refill has been denied, please complete the smart phrase .smirefuse and route it to the [facility] RN TRIAGE pool to inform the patient and the pharmacy.    Linnea Williamson, CMA

## 2018-05-29 NOTE — TELEPHONE ENCOUNTER
Routing refill request to provider for review/approval because:  Agustina given x1 and patient did not follow up, please advise  Blood pressure above range.  Last month following note was added to refill:  Medication is being filled for 1 time refill only due to: Patient needs to be seen.  INSTRUCT PT TO MAKE APPT IN CLINIC BEFORE FURTHER REFILLS NEEDED  No appointment has been scheduled   2 WEEK SUPPLY PENDED  ALSO ROUTING TO HOMA Kumar RN

## 2018-06-05 DIAGNOSIS — I10 ESSENTIAL HYPERTENSION WITH GOAL BLOOD PRESSURE LESS THAN 140/90: ICD-10-CM

## 2018-06-05 NOTE — TELEPHONE ENCOUNTER
"Requested Prescriptions   Pending Prescriptions Disp Refills     lisinopril-hydrochlorothiazide (PRINZIDE/ZESTORETIC) 20-12.5 MG per tablet  Last Written Prescription Date:  5/4/2018  Last Fill Quantity: 60 tablet,  # refills: 0   Last Office Visit: 1/10/2018   Future Office Visit:    Next 5 appointments (look out 90 days)     Jun 08, 2018  8:20 AM CDT   [facility] with [facility]án [facility]    [address] [place] [id number] [phone]                60 tablet 0    Diuretics (Including Combos) Protocol Failed    6/5/2018  2:47 PM       Failed - Blood pressure under 140/90 in past 12 months    BP Readings from Last 3 Encounters:   01/10/18 (!) 161/95   12/20/17 163/89   08/11/17 (!) 183/101          Failed - Normal serum creatinine on file in past 12 months    Recent Labs   Lab Test  05/12/17   0825   CR  0.90           Failed - Normal serum potassium on file in past 12 months    Recent Labs   Lab Test  05/12/17   0825   POTASSIUM  3.9           Failed - Normal serum sodium on file in past 12 months    Recent Labs   Lab Test  05/12/17   0825   NA  133           Passed - Recent (12 mo) or future (30 days) visit within the authorizing provider's specialty    Patient had office visit in the last 12 months or has a visit in the next 30 days with authorizing provider or within the authorizing provider's specialty.  See \"Patient Info\" tab in inbasket, or \"Choose Columns\" in Meds & Orders section of the refill encounter.           Passed - Patient is age 18 or older          "

## 2018-06-08 ENCOUNTER — OFFICE VISIT (OUTPATIENT)
Dept: FAMILY MEDICINE | Facility: CLINIC | Age: 63
End: 2018-06-08
Payer: COMMERCIAL

## 2018-06-08 VITALS
HEART RATE: 74 BPM | TEMPERATURE: 96.6 F | WEIGHT: 197.75 LBS | BODY MASS INDEX: 27.69 KG/M2 | DIASTOLIC BLOOD PRESSURE: 85 MMHG | OXYGEN SATURATION: 99 % | SYSTOLIC BLOOD PRESSURE: 190 MMHG | RESPIRATION RATE: 16 BRPM | HEIGHT: 71 IN

## 2018-06-08 DIAGNOSIS — I10 UNCONTROLLED HYPERTENSION: Primary | ICD-10-CM

## 2018-06-08 DIAGNOSIS — R73.01 IMPAIRED FASTING BLOOD SUGAR: ICD-10-CM

## 2018-06-08 DIAGNOSIS — Z13.6 SCREENING FOR CARDIOVASCULAR CONDITION: ICD-10-CM

## 2018-06-08 LAB
ANION GAP SERPL CALCULATED.3IONS-SCNC: 11 MMOL/L (ref 3–14)
BUN SERPL-MCNC: 12 MG/DL (ref 7–30)
CALCIUM SERPL-MCNC: 9.4 MG/DL (ref 8.5–10.1)
CHLORIDE SERPL-SCNC: 101 MMOL/L (ref 94–109)
CHOLEST SERPL-MCNC: 196 MG/DL
CO2 SERPL-SCNC: 25 MMOL/L (ref 20–32)
CREAT SERPL-MCNC: 0.92 MG/DL (ref 0.66–1.25)
GFR SERPL CREATININE-BSD FRML MDRD: 83 ML/MIN/1.7M2
GLUCOSE SERPL-MCNC: 139 MG/DL (ref 70–99)
HBA1C MFR BLD: 4.9 % (ref 0–5.6)
HDLC SERPL-MCNC: 38 MG/DL
LDLC SERPL CALC-MCNC: 99 MG/DL
NONHDLC SERPL-MCNC: 158 MG/DL
POTASSIUM SERPL-SCNC: 4 MMOL/L (ref 3.4–5.3)
SODIUM SERPL-SCNC: 137 MMOL/L (ref 133–144)
TRIGL SERPL-MCNC: 295 MG/DL

## 2018-06-08 PROCEDURE — 36415 COLL VENOUS BLD VENIPUNCTURE: CPT | Performed by: FAMILY MEDICINE

## 2018-06-08 PROCEDURE — 80061 LIPID PANEL: CPT | Performed by: FAMILY MEDICINE

## 2018-06-08 PROCEDURE — 83036 HEMOGLOBIN GLYCOSYLATED A1C: CPT | Performed by: FAMILY MEDICINE

## 2018-06-08 PROCEDURE — 99213 OFFICE O/P EST LOW 20 MIN: CPT | Performed by: FAMILY MEDICINE

## 2018-06-08 PROCEDURE — 80048 BASIC METABOLIC PNL TOTAL CA: CPT | Performed by: FAMILY MEDICINE

## 2018-06-08 RX ORDER — ATENOLOL 100 MG/1
100 TABLET ORAL DAILY
Qty: 90 TABLET | Refills: 0 | Status: SHIPPED | OUTPATIENT
Start: 2018-06-08 | End: 2018-09-13

## 2018-06-08 RX ORDER — AMLODIPINE BESYLATE 2.5 MG/1
TABLET ORAL
Qty: 180 TABLET | Refills: 0 | Status: SHIPPED | OUTPATIENT
Start: 2018-06-08 | End: 2018-09-13

## 2018-06-08 RX ORDER — LISINOPRIL AND HYDROCHLOROTHIAZIDE 12.5; 2 MG/1; MG/1
TABLET ORAL
Qty: 180 TABLET | Refills: 0 | Status: SHIPPED | OUTPATIENT
Start: 2018-06-08 | End: 2018-09-13

## 2018-06-08 NOTE — MR AVS SNAPSHOT
After Visit Summary   6/8/2018    Dion Bowman    MRN: [id number]           Patient Information     Date Of Birth          1955        Visit Information        Provider Department      6/8/2018 8:20 AM Chance uGzmán MD [facility]        Today's Diagnoses     Uncontrolled hypertension    -  1    Impaired fasting blood sugar        Screening for cardiovascular condition          Care Instructions    Please call [phone] to schedule an appointment for kidney ultrasound.    If kidney ultrasound is fine - please call - cardiology clinic in  [place] [phone]            Follow-ups after your visit        Additional Services     CARDIOLOGY EVAL ADULT REFERRAL       Preferred location:  [facility] [phone]   [email or web address]    Please be aware that coverage of these services is subject to the terms and limitations of your health insurance plan.  Call member services at your health plan with any benefit or coverage questions.      Please bring the following to your appointment:  Any x-rays, CTs or MRIs which have been performed. Contact the facility where they were done to arrange for  prior to your scheduled appointment.    List of current medications  This referral request   Any documents/labs given to you for this referral                  Who to contact     If you have questions or need follow up information about today's clinic visit or your schedule please contact [facility] directly at [phone].  Normal or non-critical lab and imaging results will be communicated to you by MyChart, letter or phone within 4 business days after the clinic has received the results. If you do not hear from us within 7 days, please contact the clinic through MyChart or phone. If you have a critical or abnormal lab result, we will notify you by phone as soon as possible.  Submit refill  "requests through [facility] or call your pharmacy and they will forward the refill request to us. Please allow 3 business days for your refill to be completed.          Additional Information About Your Visit        [facility] Information     [facility] [employer] [facility] gives you secure access to your electronic health record. If you see a primary care provider, you can also send messages to your care team and make appointments. If you have questions, please call your primary care clinic.  If you do not have a primary care provider, please call [phone] and they will assist you.        Care EveryWhere ID     This is your Care EveryWhere ID. This could be used by other organizations to access your [place] medical records  [id number]        Your Vitals Were     Pulse Temperature Respirations Height Pulse Oximetry BMI (Body Mass Index)    74 96.6  F (35.9  C) (Tympanic) 16 5' 11\" (1.803 m) 99% 27.58 kg/m2       Blood Pressure from Last 3 Encounters:   06/08/18 190/85   01/10/18 (!) 161/95   12/20/17 163/89    Weight from Last 3 Encounters:   06/08/18 197 lb 12 oz (89.7 kg)   01/10/18 201 lb (91.2 kg)   08/11/17 190 lb 12 oz (86.5 kg)              We Performed the Following     Basic metabolic panel     CARDIOLOGY EVAL ADULT REFERRAL     Hemoglobin A1c     Lipid panel reflex to direct LDL Fasting          Where to get your medicines      These medications were sent to [place] [id number] [place] [facility] [address] [facility]  [address] [address]     Phone:  [phone]     amLODIPine 2.5 MG tablet    atenolol 100 MG tablet    lisinopril-hydrochlorothiazide 20-12.5 MG per tablet          Primary Care Provider Office Phone # Fax #    Chance Terra Guzmán [phone] [address] [place] [address]        Equal Access to Services     WEN KINCAID AH: Rhina Schafer, waaxda luqadaha, qaybta kaalmada rosalieyaconcha, bita rodrigez. So wa " [phone].    ATENCIÓN: Si thompson sen, tiene a lemus disposición servicios gratuitos de asistencia lingüística. Liliana vega [phone].    We comply with applicable federal civil rights laws and [place] laws. We do not discriminate on the basis of race, color, national origin, age, disability, sex, sexual orientation, or gender identity.            Thank you!     Thank you for choosing [facility]  for your care. Our goal is always to provide you with excellent care. Hearing back from our patients is one way we can continue to improve our services. Please take a few minutes to complete the written survey that you may receive in the mail after your visit with us. Thank you!             Your Updated Medication List - Protect others around you: Learn how to safely use, store and throw away your medicines at www.disposemymeds.org.          This list is accurate as of 6/8/18  8:43 AM.  Always use your most recent med list.                   Brand Name Dispense Instructions for use Diagnosis    amLODIPine 2.5 MG tablet    NORVASC    180 tablet    TAKE 2 TABLETS (5MG) BY MOUTH EVERY DAY    Uncontrolled hypertension       aspirin 81 MG tablet     100    ONE DAILY    Essential hypertension, benign       atenolol 100 MG tablet    TENORMIN    90 tablet    Take 1 tablet (100 mg) by mouth daily    Uncontrolled hypertension       fish Oil 1200 MG capsule      2 caps every other day        lisinopril-hydrochlorothiazide 20-12.5 MG per tablet    PRINZIDE/ZESTORETIC    180 tablet    TAKE 2 TABLETS BY MOUTH EVERY MORNING    Uncontrolled hypertension       VITAMIN D (CHOLECALCIFEROL) PO      Take 1,000 Units by mouth daily

## 2018-06-08 NOTE — PATIENT INSTRUCTIONS
Please call [phone] to schedule an appointment for kidney ultrasound.    If kidney ultrasound is fine - please call - cardiology clinic in  [place] [phone]

## 2018-06-08 NOTE — PROGRESS NOTES
"  SUBJECTIVE:   Dion Bowman is a 62 year old male who presents to clinic today for the following health issues:      Hypertension Follow-up      Outpatient blood pressures are not being checked.    Low Salt Diet: no added salt      Amount of exercise or physical activity: 5-6 days/week for an average of 45-60 minutes    Problems taking medications regularly: No    Medication side effects: none    Diet: regular (no restrictions)    He has not followed up on renal ultrasound.  He has been taking his medication as prescribed.  He feels somewhat of an anxious person.  He does not check his blood pressure outside.  He feels fine and he denies any symptoms.  He calls him himself \"antipill\" person and Not too excited about adding more medications and his regime.  Problem list and histories reviewed & adjusted, as indicated.  Additional history: as documented    Labs reviewed in EPIC    Reviewed and updated as needed this visit by clinical staff       Reviewed and updated as needed this visit by Provider           Social History     Social History     Marital status:      Spouse name: Celi     Number of children: 2     Years of education: 16     Occupational History           [employer]      [employer]     Social History Main Topics     Smoking status: Never Smoker     Smokeless tobacco: Never Used     Alcohol use Yes      Comment: 1 drink every two weeks. During Football Season     Drug use: No     Sexual activity: Yes     Partners: Female     Other Topics Concern      Service No     Blood Transfusions No     Caffeine Concern No     Ice Tea - 2 glasses a day     Occupational Exposure No     Hobby Hazards No     Sleep Concern No     Stress Concern No     Weight Concern No     Special Diet No     Back Care No     Exercise Yes     Stair Master, Ellipitical, weights - 6 days a week     Bike Helmet No     Seat Belt Yes     Self-Exams Yes     Parent/Sibling W/ Cabg, Mi Or Angioplasty " "Before 65f 55m? No     Social History Narrative    Balanced Diet - Yes    Osteoporosis Preventative measures-  Dairy servings per day: 2-3 servings daily    Regular Exercise -  Yes Describe stair master, walking, biking    Dental Exam up - YES - Date: 2007    Eye Exam - YES - Date: 2005    Self Testicular Exam -  No    Do you have any concerns about STD's -  No    Abuse: Current or Past (Physical, Sexual or Emotional)- No    Do you feel safe in your environment - Yes    Guns stored in the home - No    Sunscreen used - No    Seatbelts used - Yes    Lipids - YES - Date: 12-12-06    Glucose -  YES - Date: 12-12-06    Colon Cancer Screening - No    Hemoccults - NO    PSA - YES - Date: 12-12-06    Digital Rectal Exam - NO    Immunizations reviewed and up to date - Yes, last TD was 9-14-04    Tiffanie Moralez MA     Allergies   Allergen Reactions     No Known Drug Allergies      Patient Active Problem List   Diagnosis     Uncontrolled hypertension     CARDIOVASCULAR SCREENING; LDL GOAL LESS THAN 130     Impaired fasting blood sugar     Reviewed medications, social history and  past medical and surgical history.    Review of system: for general, respiratory, CVS, GI and psychiatry negative except for noted above.     EXAM:  /85 (BP Location: Left arm, Patient Position: Sitting, Cuff Size: Adult Large)  Pulse 74  Temp 96.6  F (35.9  C) (Tympanic)  Resp 16  Ht 5' 11\" (1.803 m)  Wt 197 lb 12 oz (89.7 kg)  SpO2 99%  BMI 27.58 kg/m2  Constitutional: healthy, alert and no distress   Psychiatric: mentation appears normal and affect normal/bright  Cardiovascular: RRR. No murmurs,  Respiratory: negative, Lungs clear. No crackles or wheezing. No tachypnea.        ASSESSMENT / PLAN:  (I10) Uncontrolled hypertension  (primary encounter diagnosis)  Comment: Persistent for years.  He has had ambulatory bloody blood pressure monitoring for 24 hours and those readings were high too.  He is not tolerating higher dose of " amlodipine.  We discussed about switching Norvasc to 5 mg tablet but he would like to stick to 2.5 and his plan to continue 2 pills/day.  He is on the max dose of lisinopril hydrochlorothiazide combo.  He has not tolerated many other medication in the past.  This point I reminded him again to consider renal ultrasound and renal ultrasound is fine he should seek cardiology evaluation for uncontrolled hypertension and possible strategy to control his blood pressure. I also do not have baseline EKG for him but we agreed to hold off on that for now.   Plan: amLODIPine (NORVASC) 2.5 MG tablet, atenolol         (TENORMIN) 100 MG tablet,         lisinopril-hydrochlorothiazide         (PRINZIDE/ZESTORETIC) 20-12.5 MG per tablet,         CARDIOLOGY EVAL ADULT REFERRAL, Basic metabolic        panel             (R73.01) Impaired fasting blood sugar  Comment:     Plan: Hemoglobin A1c             (Z13.6) Screening for cardiovascular condition  Comment:    Plan: Lipid panel reflex to direct LDL Fasting               Patient Instructions   Please call [phone] to schedule an appointment for kidney ultrasound.    If kidney ultrasound is fine - please call - cardiology clinic in  [place] [phone]

## 2018-06-11 RX ORDER — LISINOPRIL AND HYDROCHLOROTHIAZIDE 12.5; 2 MG/1; MG/1
TABLET ORAL
Qty: 60 TABLET | Refills: 0 | OUTPATIENT
Start: 2018-06-11

## 2018-09-13 DIAGNOSIS — I10 UNCONTROLLED HYPERTENSION: ICD-10-CM

## 2018-09-13 NOTE — TELEPHONE ENCOUNTER
"Requested Prescriptions   Pending Prescriptions Disp Refills     amLODIPine (NORVASC) 5 MG tablet [Pharmacy Med Name: AMLODIPINE BESYLATE 5 MG TAB]  Last Written Prescription Date:  6/8/2018  Last Fill Quantity: 180 tablet,  # refills: 0   Last Office Visit: 6/8/2018   Future Office Visit:      90 tablet 0     Sig: TAKE 1 TABLET BY MOUTH EVERY DAY    Calcium Channel Blockers Protocol  Failed    9/13/2018 10:32 AM       Failed - Blood pressure under 140/90 in past 12 months    BP Readings from Last 3 Encounters:   06/08/18 190/85   01/10/18 (!) 161/95   12/20/17 163/89          Passed - Recent (12 mo) or future (30 days) visit within the authorizing provider's specialty    Patient had office visit in the last 12 months or has a visit in the next 30 days with authorizing provider or within the authorizing provider's specialty.  See \"Patient Info\" tab in inbasket, or \"Choose Columns\" in Meds & Orders section of the refill encounter.           Passed - Patient is age 18 or older       Passed - Normal serum creatinine on file in past 12 months    Recent Labs   Lab Test  06/08/18   0843   CR  0.92      _________________________________________________________________________________________________________________________         lisinopril-hydrochlorothiazide (PRINZIDE/ZESTORETIC) 20-12.5 MG per tablet [Pharmacy Med Name: LISINOPRIL-HCTZ 20-12.5 MG TAB]  Last Written Prescription Date:  6/8/2018  Last Fill Quantity: 180 tablet,  # refills: 0   Last Office Visit: 6/8/2018   Future Office Visit:      180 tablet 0     Sig: TAKE 2 TABLETS BY MOUTH EVERY DAY IN THE MORNING    Diuretics (Including Combos) Protocol Failed    9/13/2018 10:32 AM       Failed - Blood pressure under 140/90 in past 12 months    BP Readings from Last 3 Encounters:   06/08/18 190/85   01/10/18 (!) 161/95   12/20/17 163/89          Passed - Recent (12 mo) or future (30 days) visit within the authorizing provider's specialty    Patient had office visit " "in the last 12 months or has a visit in the next 30 days with authorizing provider or within the authorizing provider's specialty.  See \"Patient Info\" tab in inbasket, or \"Choose Columns\" in Meds & Orders section of the refill encounter.           Passed - Patient is age 18 or older       Passed - Normal serum creatinine on file in past 12 months    Recent Labs   Lab Test  06/08/18   0843   CR  0.92          Passed - Normal serum potassium on file in past 12 months    Recent Labs   Lab Test  06/08/18   0843   POTASSIUM  4.0           Passed - Normal serum sodium on file in past 12 months    Recent Labs   Lab Test  06/08/18   0843   NA  137        _________________________________________________________________________________________________________________________       atenolol (TENORMIN) 100 MG tablet [Pharmacy Med Name: ATENOLOL 100 MG TABLET]  Last Written Prescription Date:  6/8/2018  Last Fill Quantity: 90 tablet,  # refills: 0   Last Office Visit: 6/8/2018   Future Office Visit:      90 tablet 0     Sig: TAKE 1 TABLET BY MOUTH EVERY DAY    Beta-Blockers Protocol Failed    9/13/2018 10:32 AM       Failed - Blood pressure under 140/90 in past 12 months    BP Readings from Last 3 Encounters:   06/08/18 190/85   01/10/18 (!) 161/95   12/20/17 163/89          Passed - Patient is age 6 or older       Passed - Recent (12 mo) or future (30 days) visit within the authorizing provider's specialty    Patient had office visit in the last 12 months or has a visit in the next 30 days with authorizing provider or within the authorizing provider's specialty.  See \"Patient Info\" tab in inbasket, or \"Choose Columns\" in Meds & Orders section of the refill encounter.              "

## 2018-09-17 NOTE — TELEPHONE ENCOUNTER
Pt checking on refill. States he will need it before Thursday because he'll be traveling out of town. Please advise, thank you!

## 2018-09-17 NOTE — TELEPHONE ENCOUNTER
"Routing refill request to provider for review/approval because:  BP elevated    Patient has cardiology appt on 10/26/18.    Renal US not completed, nor scheduled, per chart review.    Dr. Guzmán-Please sign if agree.  45 day supply pended for each medication with note to pharmacy: \"FURTHER REFILLS BY CARDIOLOGY++\"    Thank you!  TOYA Van, BSN, RN      "

## 2018-09-18 RX ORDER — AMLODIPINE BESYLATE 5 MG/1
TABLET ORAL
Qty: 45 TABLET | Refills: 0 | Status: SHIPPED | OUTPATIENT
Start: 2018-09-18 | End: 2018-09-19

## 2018-09-18 RX ORDER — LISINOPRIL AND HYDROCHLOROTHIAZIDE 12.5; 2 MG/1; MG/1
TABLET ORAL
Qty: 90 TABLET | Refills: 0 | Status: SHIPPED | OUTPATIENT
Start: 2018-09-18 | End: 2018-09-19

## 2018-09-18 RX ORDER — ATENOLOL 100 MG/1
TABLET ORAL
Qty: 45 TABLET | Refills: 0 | Status: SHIPPED | OUTPATIENT
Start: 2018-09-18 | End: 2018-09-19

## 2018-09-19 DIAGNOSIS — I10 UNCONTROLLED HYPERTENSION: ICD-10-CM

## 2018-09-19 RX ORDER — ATENOLOL 100 MG/1
100 TABLET ORAL DAILY
Qty: 90 TABLET | Refills: 0 | Status: SHIPPED | OUTPATIENT
Start: 2018-09-19 | End: 2018-11-30

## 2018-09-19 RX ORDER — LISINOPRIL AND HYDROCHLOROTHIAZIDE 12.5; 2 MG/1; MG/1
TABLET ORAL
Qty: 180 TABLET | Refills: 0 | Status: SHIPPED | OUTPATIENT
Start: 2018-09-19 | End: 2018-11-30

## 2018-09-19 RX ORDER — AMLODIPINE BESYLATE 5 MG/1
5 TABLET ORAL DAILY
Qty: 90 TABLET | Refills: 0 | Status: SHIPPED | OUTPATIENT
Start: 2018-09-19 | End: 2018-11-19

## 2018-09-19 RX ORDER — LISINOPRIL AND HYDROCHLOROTHIAZIDE 12.5; 2 MG/1; MG/1
TABLET ORAL
Qty: 90 TABLET | Refills: 0 | Status: SHIPPED | OUTPATIENT
Start: 2018-09-19 | End: 2018-09-19

## 2018-09-19 NOTE — TELEPHONE ENCOUNTER
These meds were refilled by pcp on 9/18/18.    Talked to pt's wife, Macey.  She was just at [facility]. They don't have the rxs.  Pt doesn't have a cardiologist. This may need to be discussed at his appt on 10/1/18.    I talked to [facility]. Pt's insurance won't cover less than a 90 day supply.    Routed to pcp.  NIKITA Terrazas

## 2018-09-19 NOTE — TELEPHONE ENCOUNTER
LM for pt on home VM about available 90 day supply of med. This is a mandate from pt's insurance. PCPsays pt should f/u with cards to get better control of BP.  NIKITA Terrazas

## 2018-09-19 NOTE — TELEPHONE ENCOUNTER
Reason for Call:  Medication or medication refill:    Do you use a [facility]?  Name of the pharmacy and phone number for the current request:  [facility] [id number] [place] [facility], [place] - [address] [place] [address]    Name of the medication requested: atenolol (TENORMIN) 100 MG tablet,lisinopril-hydrochlorothiazide (PRINZIDE/ZESTORETIC) 20-12.5 MG per tablet,     Other request: Pt called and we made an appt for a med check on 10/01 when he returns. But he is leaving town and he needs these medications by today per pt. Please Advise thank you    Can we leave a detailed message on this number? YES    Phone number patient can be reached at: Home number on file [phone] (home)    Best Time: anytime    Call taken on 9/19/2018 at 12:50 PM by Stacy Jackson

## 2018-10-26 ENCOUNTER — OFFICE VISIT (OUTPATIENT)
Dept: CARDIOLOGY | Facility: CLINIC | Age: 63
End: 2018-10-26
Attending: FAMILY MEDICINE
Payer: COMMERCIAL

## 2018-10-26 VITALS
SYSTOLIC BLOOD PRESSURE: 164 MMHG | WEIGHT: 197.1 LBS | OXYGEN SATURATION: 99 % | DIASTOLIC BLOOD PRESSURE: 88 MMHG | HEART RATE: 83 BPM | HEIGHT: 71 IN | BODY MASS INDEX: 27.59 KG/M2

## 2018-10-26 DIAGNOSIS — I1A.0 RESISTANT HYPERTENSION: Primary | ICD-10-CM

## 2018-10-26 PROCEDURE — 93000 ELECTROCARDIOGRAM COMPLETE: CPT | Performed by: INTERNAL MEDICINE

## 2018-10-26 PROCEDURE — 99204 OFFICE O/P NEW MOD 45 MIN: CPT | Performed by: INTERNAL MEDICINE

## 2018-10-26 NOTE — PATIENT INSTRUCTIONS
1.  Agree with increasing amlodipine from 5 mg to 10 mg daily, while continuing the other medications.    2.  Heart ultrasound (transthoracic echocardiogram) and kidney ultrasound (renal ultrasound) to be done in 1 month.    3.  Follow-up in my clinic in 1 month.    If you have any questions or concerns, please contact my nurses at [phone].

## 2018-10-26 NOTE — LETTER
10/26/2018    Chance Guzmán MD, MD  [address] [place], [place] [id number]    RE: Dion MONTERO Colby       Dear Colleague,    I had the pleasure of seeing Dion Bowman in the [facility].    Clinic visit note dictated. Dictation reference number - [id number]        REVIEW OF SYSTEMS:  A comprehensive 10-point review of systems was completed and the pertinent positives are documented in the history of present illness.    Skin:  Negative     Eyes:  Negative    ENT:       Respiratory:  Negative shortness of breath  Cardiovascular:  Negative;palpitations;chest pain;edema;fatigue;lightheadedness;dizziness    Gastroenterology: Negative    Genitourinary:       Musculoskeletal:  Negative    Neurologic:  Negative    Psychiatric:  Positive for anxiety  Heme/Lymph/Imm:  Negative    Endocrine:  Negative      CURRENT MEDICATIONS:  Current Outpatient Prescriptions   Medication Sig Dispense Refill     amLODIPine (NORVASC) 5 MG tablet Take 1 tablet (5 mg) by mouth daily 90 tablet 0     ASPIRIN 81 MG OR TABS ONE DAILY 100 3     atenolol (TENORMIN) 100 MG tablet Take 1 tablet (100 mg) by mouth daily 90 tablet 0     FISH OIL 1200 MG OR CAPS 2 caps every other day       lisinopril-hydrochlorothiazide (PRINZIDE/ZESTORETIC) 20-12.5 MG per tablet TAKE 2 TABLETS BY MOUTH EVERY DAY IN THE MORNING 180 tablet 0     VITAMIN D, CHOLECALCIFEROL, PO Take 1,000 Units by mouth daily           ALLERGIES:  Allergies   Allergen Reactions     No Known Drug Allergies        PAST MEDICAL HISTORY:    Past Medical History:   Diagnosis Date     CARDIOVASCULAR SCREENING; LDL GOAL LESS THAN 130 5/9/2010     Hypertension goal BP (blood pressure) < 140/90 12/16/2010     Impaired fasting blood sugar 12/21/2010       PAST SURGICAL HISTORY:    Past Surgical History:   Procedure Laterality Date     HC COLONOSCOPY THRU STOMA, DIAGNOSTIC  3/09/2009    Negative - Due in 2019     HERNIA REPAIR, INGUINAL RT/LT  1/26/2006    Left Inguinal  Hernia       FAMILY HISTORY:    Family History   Problem Relation Age of Onset     Hypertension Mother      Alcohol/Drug Mother      alcohol     Alcohol/Drug Father      alcohol     Cancer Father      lung     Cancer Sister      lung       SOCIAL HISTORY:    Social History     Social History     Marital status:      Spouse name: Celi     Number of children: 2     Years of education: 16     Occupational History           [employer]      [employer]     Social History Main Topics     Smoking status: Never Smoker     Smokeless tobacco: Never Used     Alcohol use Yes      Comment: 1 drink every two weeks. During Football Season     Drug use: No     Sexual activity: Yes     Partners: Female     Other Topics Concern      Service No     Blood Transfusions No     Caffeine Concern No     Ice Tea - 2 glasses a day     Occupational Exposure No     Hobby Hazards No     Sleep Concern No     Stress Concern No     Weight Concern No     Special Diet No     Back Care No     Exercise Yes     Stair Master, Ellipitical, weights - 6 days a week     Bike Helmet No     Seat Belt Yes     Self-Exams Yes     Parent/Sibling W/ Cabg, Mi Or Angioplasty Before 65f 55m? No     Social History Narrative    Balanced Diet - Yes    Osteoporosis Preventative measures-  Dairy servings per day: 2-3 servings daily    Regular Exercise -  Yes Describe stair master, walking, biking    Dental Exam up - YES - Date: 2007    Eye Exam - YES - Date: 2005    Self Testicular Exam -  No    Do you have any concerns about STD's -  No    Abuse: Current or Past (Physical, Sexual or Emotional)- No    Do you feel safe in your environment - Yes    Guns stored in the home - No    Sunscreen used - No    Seatbelts used - Yes    Lipids - YES - Date: 12-12-06    Glucose -  YES - Date: 12-12-06    Colon Cancer Screening - No    Hemoccults - NO    PSA - YES - Date: 12-12-06    Digital Rectal Exam - NO    Immunizations reviewed and up to  "date - Yes, last TD was 9-14-04    Tiffanie Moralez MA       PHYSICAL EXAM:    Vitals: /88 (BP Location: Right arm, Patient Position: Chair, Cuff Size: Adult Regular)  Pulse 83  Ht 1.803 m (5' 11\")  Wt 89.4 kg (197 lb 1.6 oz)  SpO2 99%  BMI 27.49 kg/m2  Wt Readings from Last 5 Encounters:   10/26/18 89.4 kg (197 lb 1.6 oz)   06/08/18 89.7 kg (197 lb 12 oz)   01/10/18 91.2 kg (201 lb)   08/11/17 86.5 kg (190 lb 12 oz)   05/12/17 88.2 kg (194 lb 8 oz)       Constitutional: Comfortable at rest. Cooperative, alert and oriented, well developed, well nourished.  Eyes: Pupils equal and round, conjunctivae and lids unremarkable, sclera white, no xanthalasma,   ENT: Satisfactory dentition.  No cyanosis or pallor.  Neck: Carotid pulses are full and equal bilaterally, no carotid bruit, no thyromegaly     Respiratory: Normal respiratory effort with symmetrical chest wall movements and no use of accessory muscles. Good air entry with normal breath sounds and no rales or wheeze.  Cardiovascular: Normal jugular venous pulse and pressure.  Normal carotid pulse character and volume.  No carotid bruit.  Apical impulse is undisplaced and normal to palpation without parasternal heave or thrill.  Heart sounds are normal and regular. No audible murmur. No S3, S4 or friction rub.    GI: Soft, nontender, no hepatosplenomegaly, no masses, active bowel sounds.    Skin: No rash, erythema, ecchymosis.  Musculoskeletal: Normal muscle tone and strength. Normal gait. No spinal deformities.  Neuropsychiatric: Oriented to time place and person.  Affect normal.  No gross motor deficits.  Extremities: No edema. No clubbing or deformities.        Encounter Diagnosis   Name Primary?     Resistant hypertension Yes       Orders Placed This Encounter   Procedures     US Renal Complete w Doppler Complete     Follow-Up with Cardiologist     EKG 12-lead complete w/read - Clinics (performed today)     Echocardiogram       CC  REFERRING " PROVIDER:  Chance Guzmán MD  [address] [place] [id number]                  Thank you for allowing me to participate in the care of your patient.      Sincerely,     Claudette Diallo MD     [facility]    cc:   Chance Guzmán MD  [address] [place] [id number]

## 2018-10-26 NOTE — PROGRESS NOTES
"Service Date: 10/26/2018      PRIMARY CARE AND REFERRING PROVIDER:  Chance Guzmán MD       REASON FOR VISIT:  Resistant hypertension.      HISTORY OF PRESENT ILLNESS:    Mr. 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  Apart from essential hypertension, he has no other significant medical history and has a very heart-healthy lifestyle.  Lifelong never tobacco user, social alcohol intake, BMI 27.5 kg/m2 and avid exerciser.      Mr. Bowman was diagnosed with hypertension in his early 40s  Initially, his blood pressure was well controlled on a single agent, but over the course of the years, additional medications have been required to maintain a BP less than 140/90 mmHg.  During my visit with him, it was apparent as he himself endorses that he gets quite \"agitated and confrontational\" when it comes to treatment of his hypertension.  He rightly feels that he \"has been doing everything right.\"  This includes watching dietary sodium intake, minimizing eating outside, not using tobacco or recreational drugs or caffeine, minimal alcohol, exercising at [employer] several times a week (up to an hour of aerobic and strength training).  Despite this, his blood pressure is elevated, and he feels this is extremely unfair.  He is not sure of a history of hypertension in his parents, as they were both alcoholics and  young.  There is no history of coronary artery disease, strokes, death or cardiomyopathy.      Over the course of the last year, he has had both systolic and diastolic hypertension as high as 183/101, and today it was 164/88 mmHg.  He is on Amlodipine 5 mg daily, Aspirin 81 mg, Atenolol 100 mg and Zestoretic combination pill (lisinopril 20/hydrochlorothiazide 12.5).  He has no symptoms.       Dr. Guzmán appropriately recommended that he increase the amlodipine or the lisinopril, but the patient does not want to take additional pills and has been resistant to " making these changes.  He candidly states that it makes him feel weak to take medications.  He has the usual stressors related to the job, but outside of that, there are no unusual stressors.      ECG done today shows normal sinus rhythm of 76 BPM with normal cardiac intervals ( msec and QTc 432 msec) with borderline LVH criteria.      He had a 24 hour ambulatory blood pressure monitor in 2016, which showed an average BP of 137/82, awake average of 141/84 and asleep average of 112/67 mmHg.  He has good restorative sleep, and the clinical probability of sleep apnea is low.      Labs:  Sodium 137, potassium 4.0, BUN 12, creatinine 0.9 with an estimated GFR of 83, HbA1c 4.9%, total cholesterol 196, HDL 38, LDL 99, triglycerides 295.      He has not had prior cardiac imaging.      PHYSICAL EXAMINATION:   VITAL SIGNS:  /88, rechecked 138/94 mmHg, pulse 83 per minute, height 1.803 m (5 feet 11 inches), weight 89.4 kg (197 pounds), respiratory rate 16 per minute, sats 99% on room air, BMI 27.5 kg/m2.    The patient's physical exam is entirely unremarkable apart from visible anxiety that markedly improved towards the end of the consultation.    CARDIOVASCULAR:  Normal JVP.  Normal carotid pulse.  No difference in blood pressure between both arms.  No radial-radial or radial-femoral delay.  No bruit.  Normal apical impulse.  Regular heart sounds.  No murmur, S3 or S4.   LUNGS:  Clear to auscultation.   EXTREMITIES:  No edema.      DIAGNOSIS:    1. Resistant hypertension.    I had a long conversation with the patient.  We talked about the diagnosis, epidemiology, natural history, treatment options of hypertension and the long-term complications, such as heart attack, stroke, renal disease.  He is a very intelligent man and understands that, logically, he will need to take more medications, but confesses he is having a challenging time emotionally coming to terms with it.  At the end of the consultation, he seemed  more agreeable to up-titrating medications.  I checked his blood pressure at the end, and it had decreased to 138/94 mmHg.        RECOMMENDATIONS:   1.  I completely agree with Dr. Guzmán's management of the patient's hypertension.  The next step would be up-titration of amlodipine from 5 to10 mg daily.  The patient's goal blood pressure is less than 140/90 mmHg.  Given the long duration of disease burden he had, it would preferably be even lower.  If it remains uncontrolled on the higher dose of the amlodipine, I would increase the lisinopril to 40 mg daily.   2.  Although the probability of secondary hypertension is low, I would like to obtain a renal ultrasound.   3.  His transthoracic echocardiogram.  If he has significant concentric LVH, his goal BP would be less than 130/80 mmHg.   4.  We also discussed other means to reduce his anxiety related to the diagnosis.  I think yoga and swimming would be good options for him, and he is going to explore them.  I have also suggested that he not frequently check his blood pressure at home.   5.  I will see him back in 1 month with the results of the echo and renal ultrasound.      It was my pleasure to visit with Mr. Bowman.  I spent 45 minutes with him; greater than 50% of the time was spent in counseling and coordination of care.      cc:   Chance uGzmán MD   [address] WIL WATSON MD             D: 10/26/2018   T: 10/26/2018   MT: john      Name:     EVI BOWMAN   MRN:      [id number]        Account:      [id number]   :      1955           Service Date: 10/26/2018      Document: [id number]

## 2018-10-26 NOTE — PROGRESS NOTES
Clinic visit note dictated. Dictation reference number - [id number]        REVIEW OF SYSTEMS:  A comprehensive 10-point review of systems was completed and the pertinent positives are documented in the history of present illness.    Skin:  Negative     Eyes:  Negative    ENT:       Respiratory:  Negative shortness of breath  Cardiovascular:  Negative;palpitations;chest pain;edema;fatigue;lightheadedness;dizziness    Gastroenterology: Negative    Genitourinary:       Musculoskeletal:  Negative    Neurologic:  Negative    Psychiatric:  Positive for anxiety  Heme/Lymph/Imm:  Negative    Endocrine:  Negative      CURRENT MEDICATIONS:  Current Outpatient Prescriptions   Medication Sig Dispense Refill     amLODIPine (NORVASC) 5 MG tablet Take 1 tablet (5 mg) by mouth daily 90 tablet 0     ASPIRIN 81 MG OR TABS ONE DAILY 100 3     atenolol (TENORMIN) 100 MG tablet Take 1 tablet (100 mg) by mouth daily 90 tablet 0     FISH OIL 1200 MG OR CAPS 2 caps every other day       lisinopril-hydrochlorothiazide (PRINZIDE/ZESTORETIC) 20-12.5 MG per tablet TAKE 2 TABLETS BY MOUTH EVERY DAY IN THE MORNING 180 tablet 0     VITAMIN D, CHOLECALCIFEROL, PO Take 1,000 Units by mouth daily           ALLERGIES:  Allergies   Allergen Reactions     No Known Drug Allergies        PAST MEDICAL HISTORY:    Past Medical History:   Diagnosis Date     CARDIOVASCULAR SCREENING; LDL GOAL LESS THAN 130 5/9/2010     Hypertension goal BP (blood pressure) < 140/90 12/16/2010     Impaired fasting blood sugar 12/21/2010       PAST SURGICAL HISTORY:    Past Surgical History:   Procedure Laterality Date     HC COLONOSCOPY THRU STOMA, DIAGNOSTIC  3/09/2009    Negative - Due in 2019     HERNIA REPAIR, INGUINAL RT/LT  1/26/2006    Left Inguinal Hernia       FAMILY HISTORY:    Family History   Problem Relation Age of Onset     Hypertension Mother      Alcohol/Drug Mother      alcohol     Alcohol/Drug Father      alcohol     Cancer Father      lung     Cancer Sister      " lung       SOCIAL HISTORY:    Social History     Social History     Marital status:      Spouse name: Celi     Number of children: 2     Years of education: 16     Occupational History           [employer]      [employer]     Social History Main Topics     Smoking status: Never Smoker     Smokeless tobacco: Never Used     Alcohol use Yes      Comment: 1 drink every two weeks. During Football Season     Drug use: No     Sexual activity: Yes     Partners: Female     Other Topics Concern      Service No     Blood Transfusions No     Caffeine Concern No     Ice Tea - 2 glasses a day     Occupational Exposure No     Hobby Hazards No     Sleep Concern No     Stress Concern No     Weight Concern No     Special Diet No     Back Care No     Exercise Yes     Stair Master, Ellipitical, weights - 6 days a week     Bike Helmet No     Seat Belt Yes     Self-Exams Yes     Parent/Sibling W/ Cabg, Mi Or Angioplasty Before 65f 55m? No     Social History Narrative    Balanced Diet - Yes    Osteoporosis Preventative measures-  Dairy servings per day: 2-3 servings daily    Regular Exercise -  Yes Describe stair master, walking, biking    Dental Exam up - YES - Date: 2007    Eye Exam - YES - Date: 2005    Self Testicular Exam -  No    Do you have any concerns about STD's -  No    Abuse: Current or Past (Physical, Sexual or Emotional)- No    Do you feel safe in your environment - Yes    Guns stored in the home - No    Sunscreen used - No    Seatbelts used - Yes    Lipids - YES - Date: 12-12-06    Glucose -  YES - Date: 12-12-06    Colon Cancer Screening - No    Hemoccults - NO    PSA - YES - Date: 12-12-06    Digital Rectal Exam - NO    Immunizations reviewed and up to date - Yes, last TD was 9-14-04    Tiffanie Moralez MA       PHYSICAL EXAM:    Vitals: /88 (BP Location: Right arm, Patient Position: Chair, Cuff Size: Adult Regular)  Pulse 83  Ht 1.803 m (5' 11\")  Wt 89.4 kg (197 lb 1.6 " oz)  SpO2 99%  BMI 27.49 kg/m2  Wt Readings from Last 5 Encounters:   10/26/18 89.4 kg (197 lb 1.6 oz)   06/08/18 89.7 kg (197 lb 12 oz)   01/10/18 91.2 kg (201 lb)   08/11/17 86.5 kg (190 lb 12 oz)   05/12/17 88.2 kg (194 lb 8 oz)       Constitutional: Comfortable at rest. Cooperative, alert and oriented, well developed, well nourished.  Eyes: Pupils equal and round, conjunctivae and lids unremarkable, sclera white, no xanthalasma,   ENT: Satisfactory dentition.  No cyanosis or pallor.  Neck: Carotid pulses are full and equal bilaterally, no carotid bruit, no thyromegaly     Respiratory: Normal respiratory effort with symmetrical chest wall movements and no use of accessory muscles. Good air entry with normal breath sounds and no rales or wheeze.  Cardiovascular: Normal jugular venous pulse and pressure.  Normal carotid pulse character and volume.  No carotid bruit.  Apical impulse is undisplaced and normal to palpation without parasternal heave or thrill.  Heart sounds are normal and regular. No audible murmur. No S3, S4 or friction rub.    GI: Soft, nontender, no hepatosplenomegaly, no masses, active bowel sounds.    Skin: No rash, erythema, ecchymosis.  Musculoskeletal: Normal muscle tone and strength. Normal gait. No spinal deformities.  Neuropsychiatric: Oriented to time place and person.  Affect normal.  No gross motor deficits.  Extremities: No edema. No clubbing or deformities.        Encounter Diagnosis   Name Primary?     Resistant hypertension Yes       Orders Placed This Encounter   Procedures     US Renal Complete w Doppler Complete     Follow-Up with Cardiologist     EKG 12-lead complete w/read - Clinics (performed today)     Echocardiogram       CC  REFERRING PROVIDER:  Chance Guzmán MD  [address] [place] [address]

## 2018-10-26 NOTE — MR AVS SNAPSHOT
After Visit Summary   10/26/2018    Dion Bowman    MRN: [id number]           Patient Information     Date Of Birth          1955        Visit Information        Provider Department      10/26/2018 11:15 AM Claudette Diallo MD [facility]        Today's Diagnoses     Resistant hypertension    -  1      Care Instructions    1.  Agree with increasing amlodipine from 5 mg to 10 mg daily, while continuing the other medications.    2.  Heart ultrasound (transthoracic echocardiogram) and kidney ultrasound (renal ultrasound) to be done in 1 month.    3.  Follow-up in my clinic in 1 month.    If you have any questions or concerns, please contact my nurses at [phone].            Follow-ups after your visit        Additional Services     Follow-Up with Cardiologist                 Future tests that were ordered for you today     Open Future Orders        Priority Expected Expires Ordered    US Renal Complete w Doppler Complete Routine 11/2/2018 10/26/2019 10/26/2018    Echocardiogram Routine 11/25/2018 10/26/2019 10/26/2018    Follow-Up with Cardiologist Routine 11/25/2018 10/26/2019 10/26/2018            Who to contact     If you have questions or need follow up information about today's clinic visit or your schedule please contact [facility] directly at [phone].  Normal or non-critical lab and imaging results will be communicated to you by MyChart, letter or phone within 4 business days after the clinic has received the results. If you do not hear from us within 7 days, please contact the clinic through MyChart or phone. If you have a critical or abnormal lab result, we will notify you by phone as soon as possible.  Submit refill requests through [employer] or call your pharmacy and they will forward the refill request to us. Please allow 3 business days for your refill to be completed.          Additional Information  "About Your Visit        MyChart Information     [employer] gives you secure access to your electronic health record. If you see a primary care provider, you can also send messages to your care team and make appointments. If you have questions, please call your primary care clinic.  If you do not have a primary care provider, please call [phone] and they will assist you.        Care EveryWhere ID     This is your Care EveryWhere ID. This could be used by other organizations to access your [facility] medical records  [id number]        Your Vitals Were     Pulse Height Pulse Oximetry BMI (Body Mass Index)          83 1.803 m (5' 11\") 99% 27.49 kg/m2         Blood Pressure from Last 3 Encounters:   10/26/18 164/88   06/08/18 190/85   01/10/18 (!) 161/95    Weight from Last 3 Encounters:   10/26/18 89.4 kg (197 lb 1.6 oz)   06/08/18 89.7 kg (197 lb 12 oz)   01/10/18 91.2 kg (201 lb)              We Performed the Following     EKG 12-lead complete w/read - Clinics (performed today)        Primary Care Provider Office Phone # Fax #    Chance Terra Guzmán [phone]       [facility] [place], [place] [id number]        Equal Access to Services     WEN KINCAID : Hadii kari cejao Sojanine, waaxda luqadaha, qaybta kaalmada adeegyada, bita rodrigez. So [facility] [phone].    ATENCIÓN: Si habla español, tiene a lemus disposición servicios gratuitos de asistencia lingüística. Llame al [phone].    We comply with applicable federal civil rights laws and [place] laws. We do not discriminate on the basis of race, color, national origin, age, disability, sex, sexual orientation, or gender identity.            Thank you!     Thank you for choosing [facility]  for your care. Our goal is always to provide you with excellent care. Hearing back from our patients is one way we can continue to improve our services. Please take a few minutes to " complete the written survey that you may receive in the mail after your visit with us. Thank you!             Your Updated Medication List - Protect others around you: Learn how to safely use, store and throw away your medicines at www.disposemymeds.org.          This list is accurate as of 10/26/18 12:22 PM.  Always use your most recent med list.                   Brand Name Dispense Instructions for use Diagnosis    amLODIPine 5 MG tablet    NORVASC    90 tablet    Take 1 tablet (5 mg) by mouth daily    Uncontrolled hypertension       aspirin 81 MG tablet     100    ONE DAILY    Essential hypertension, benign       atenolol 100 MG tablet    TENORMIN    90 tablet    Take 1 tablet (100 mg) by mouth daily    Uncontrolled hypertension       fish Oil 1200 MG capsule      2 caps every other day        lisinopril-hydrochlorothiazide 20-12.5 MG per tablet    PRINZIDE/ZESTORETIC    180 tablet    TAKE 2 TABLETS BY MOUTH EVERY DAY IN THE MORNING    Uncontrolled hypertension       VITAMIN D (CHOLECALCIFEROL) PO      Take 1,000 Units by mouth daily

## 2018-10-26 NOTE — LETTER
"10/26/2018      Chance Guzmán MD, MD  [address] [place] [address]      RE: Dion Bowman       Dear Colleague,    I had the pleasure of seeing Dion Bowman in the [facility].    Service Date: 10/26/2018      PRIMARY CARE AND REFERRING PROVIDER:  Chance Guzmán MD       REASON FOR VISIT:  Resistant hypertension.      HISTORY OF PRESENT ILLNESS:  Mr. 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  Apart from essential hypertension, he has no other significant medical history and has a very heart-healthy lifestyle.  Lifelong never tobacco user, social alcohol intake, BMI 27.5 kg/m2 and avid exerciser.      Mr. Bowman was diagnosed with hypertension in his early 40s  Initially, his blood pressure was well controlled on a single agent, but over the course of the years, additional medications have been required to maintain a BP less than 140/90 mmHg.  During my visit with him, it was apparent as he himself endorses that he gets quite \"agitated and confrontational\" when it comes to treatment of his hypertension.  He rightly feels that he \"has been doing everything right.\"  This includes watching dietary sodium intake, minimizing eating outside, not using tobacco or recreational drugs or caffeine, minimal alcohol, exercising at [employer] several times a week (up to an hour of aerobic and strength training).  Despite this, his blood pressure is elevated, and he feels this is extremely unfair.  He is not sure of a history of hypertension in his parents, as they were both alcoholics and  young.  There is no history of coronary artery disease, strokes, death or cardiomyopathy.      Over the course of the last year, he has had both systolic and diastolic hypertension as high as 183/101, and today it was 164/88 mmHg.  He is on:    1.  Amlodipine 5 mg daily.   2.  Aspirin 81 mg.   3.  Atenolol 100 mg   4.  Zestoretic " combination pill (lisinopril 20/hydrochlorothiazide 12.5).        Dr. Guzmán appropriately recommended that he increase the amlodipine or the lisinopril, but the patient does not want to take additional pills and has been resistant to making these changes.  He candidly states that it makes him feel weak to take medications.  He has the usual stressors related to the job, but outside of that, there are no unusual stressors.      ECG done today shows normal sinus rhythm of 76 BPM with normal cardiac intervals ( msec and QTc 432 msec) with borderline LVH criteria.      He had a 24 hour ambulatory blood pressure monitor in 2016, which showed an average BP of 137/82, awake average of 141/84 and asleep average of 112/67 mmHg.  He has good restorative sleep, and the clinical probability of sleep apnea is low.      LABORATORY:  Sodium 137, potassium 4.0, BUN 12, creatinine 0.9 with an estimated GFR of 83, HbA1c 4.9%, total cholesterol 196, HDL 38, LDL 99, triglycerides 295.      He has not had prior cardiac imaging.      ALLERGIES:  No known allergies.      He has no symptoms.      PHYSICAL EXAMINATION:   VITAL SIGNS:  /88, rechecked 138/94 mmHg, pulse 83 per minute, height 1.803 m (5 feet 11 inches), weight 89.4 kg (197 pounds), respiratory rate 16 per minute, sats 99% on room air, BMI 27.5 kg/m2.      The patient's physical exam is entirely unremarkable apart from visible anxiety that markedly improved towards the end of the consultation.      Specifically, CARDIOVASCULAR:  Normal JVP.  Normal carotid pulse.  No difference in blood pressure between both arms.  No radial-radial or radial-femoral delay.  No bruit.  Normal apical impulse.  Regular heart sounds.  No murmur, S3 or S4.   LUNGS:  Clear to auscultation.   EXTREMITIES:  No edema.      DIAGNOSIS:  Resistant hypertension.      I had a long conversation with the patient.  We talked about the diagnosis, epidemiology, natural history, treatment options of  hypertension and the long-term complications, such as heart attack, stroke, renal disease.  He is a very intelligent man and understands that, logically, he will need to take more medications, but confesses he is having a challenging time emotionally coming to terms with it.  At the end of the consultation, he seemed more agreeable to up-titrating medications.  I checked his blood pressure at the end, and it had decreased to 138/94 mmHg.        RECOMMENDATIONS:   1.  I completely agree with Dr. Guzmán's management of the patient's hypertension.  The next step would be up-titration of amlodipine from 5-10 mg daily.  The patient's goal blood pressure is less than 140/90 mmHg.  Given the long duration of disease burden he had, it would preferably be even lower.  If it remains uncontrolled on the higher dose of the amlodipine, I would increase the lisinopril to 40 mg daily.   2.  Although the probability of secondary hypertension is low, I would like to obtain a renal ultrasound.   3.  His transthoracic echocardiogram.  If he has significant concentric LVH, his goal BP would be less than 130/80 mmHg.   4.  We also discussed other means to reduce his anxiety related to the diagnosis.  I think yoga and swimming would be good options for him, and he is going to explore them.  I have also suggested that he not frequently check his blood pressure at home.   5.  I will see him back in 1 month with the results of the echo and renal ultrasound.      It was my pleasure to visit with Mr. Bowman.  I spent 45 minutes with him; greater than 50% of the time was spent in counseling and coordination of care.      cc:   Chance Guzmán MD   [address] WIL WATSON MD             D: 10/26/2018   T: 10/26/2018   MT: john      Name:     EVI BOWMAN   MRN:      [id number]        Account:      [id number]   :      1955           Service Date: 10/26/2018       Document: [id number]         Outpatient Encounter Prescriptions as of 10/26/2018   Medication Sig Dispense Refill     amLODIPine (NORVASC) 5 MG tablet Take 1 tablet (5 mg) by mouth daily 90 tablet 0     ASPIRIN 81 MG OR TABS ONE DAILY 100 3     atenolol (TENORMIN) 100 MG tablet Take 1 tablet (100 mg) by mouth daily 90 tablet 0     FISH OIL 1200 MG OR CAPS 2 caps every other day       lisinopril-hydrochlorothiazide (PRINZIDE/ZESTORETIC) 20-12.5 MG per tablet TAKE 2 TABLETS BY MOUTH EVERY DAY IN THE MORNING 180 tablet 0     VITAMIN D, CHOLECALCIFEROL, PO Take 1,000 Units by mouth daily       No facility-administered encounter medications on file as of 10/26/2018.        Again, thank you for allowing me to participate in the care of your patient.      Sincerely,    Claudette Diallo MD     [facility]

## 2018-11-19 DIAGNOSIS — I10 UNCONTROLLED HYPERTENSION: ICD-10-CM

## 2018-11-19 NOTE — TELEPHONE ENCOUNTER
"Requested Prescriptions   Pending Prescriptions Disp Refills     amLODIPine (NORVASC) 5 MG tablet [Pharmacy Med Name: AMLODIPINE BESYLATE 5 MG TAB]  Last Written Prescription Date:  9/19/2018  Last Fill Quantity: 90 tablet,  # refills: 0   Last Office Visit: 6/8/2018   Future Office Visit:    Next 5 appointments (look out 90 days)     Nov 30, 2018  8:15 AM CST   Return Visit with Claudette Diallo MD   [facility] ([facility])    [address] [place] [id number] [phone] OPT 2                90 tablet 0     Sig: TAKE 1 TABLET BY MOUTH EVERY DAY    Calcium Channel Blockers Protocol  Failed    11/19/2018  3:34 PM       Failed - Blood pressure under 140/90 in past 12 months    BP Readings from Last 3 Encounters:   10/26/18 164/88   06/08/18 190/85   01/10/18 (!) 161/95          Passed - Recent (12 mo) or future (30 days) visit within the authorizing provider's specialty    Patient had office visit in the last 12 months or has a visit in the next 30 days with authorizing provider or within the authorizing provider's specialty.  See \"Patient Info\" tab in inbasket, or \"Choose Columns\" in Meds & Orders section of the refill encounter.           Passed - Patient is age 18 or older       Passed - Normal serum creatinine on file in past 12 months    Recent Labs   Lab Test  06/08/18   0843   CR  0.92             "

## 2018-11-22 NOTE — TELEPHONE ENCOUNTER
Routing refill request to provider for review/approval because:  BP elevated    Dr. Guzmán-Please sign if agree.  Patient has cardiology appt on 11/30/18.    Thank you!  TANMAY ZarateN, RN

## 2018-11-23 RX ORDER — AMLODIPINE BESYLATE 5 MG/1
TABLET ORAL
Qty: 90 TABLET | Refills: 0 | Status: SHIPPED | OUTPATIENT
Start: 2018-11-23 | End: 2018-11-30

## 2018-11-26 ENCOUNTER — HOSPITAL ENCOUNTER (OUTPATIENT)
Dept: ULTRASOUND IMAGING | Facility: CLINIC | Age: 63
Discharge: HOME | End: 2018-11-26
Attending: INTERNAL MEDICINE | Admitting: INTERNAL MEDICINE
Payer: COMMERCIAL

## 2018-11-26 DIAGNOSIS — I1A.0 RESISTANT HYPERTENSION: ICD-10-CM

## 2018-11-26 DIAGNOSIS — I10 UNCONTROLLED HYPERTENSION: ICD-10-CM

## 2018-11-26 PROCEDURE — 76770 US EXAM ABDO BACK WALL COMP: CPT

## 2018-11-26 PROCEDURE — 93975 VASCULAR STUDY: CPT | Mod: 26 | Performed by: INTERNAL MEDICINE

## 2018-11-26 PROCEDURE — 76770 US EXAM ABDO BACK WALL COMP: CPT | Mod: 26 | Performed by: INTERNAL MEDICINE

## 2018-11-27 NOTE — TELEPHONE ENCOUNTER
"Requested Prescriptions   Pending Prescriptions Disp Refills     amLODIPine (NORVASC) 5 MG tablet [Pharmacy Med Name: AMLODIPINE BESYLATE 5 MG TAB]  Last Written Prescription Date:  11/23/2018  Last Fill Quantity: 90 tabelt,  # refills: 0   Last Office Visit: 6/8/2018   Future Office Visit:    Next 5 appointments (look out 90 days)     Nov 30, 2018  8:15 AM CST   Return Visit with Claudette Diallo MD   [facility] ([facility])    [address] [place] [id number] [phone] OPT 2                90 tablet 0     Sig: TAKE 1 TABLET BY MOUTH EVERY DAY    Calcium Channel Blockers Protocol  Failed    11/26/2018  4:56 PM       Failed - Blood pressure under 140/90 in past 12 months    BP Readings from Last 3 Encounters:   10/26/18 164/88   06/08/18 190/85   01/10/18 (!) 161/95          Passed - Recent (12 mo) or future (30 days) visit within the authorizing provider's specialty    Patient had office visit in the last 12 months or has a visit in the next 30 days with authorizing provider or within the authorizing provider's specialty.  See \"Patient Info\" tab in inbasket, or \"Choose Columns\" in Meds & Orders section of the refill encounter.           Passed - Patient is age 18 or older       Passed - Normal serum creatinine on file in past 12 months    Recent Labs   Lab Test  06/08/18   0843   CR  0.92               "

## 2018-11-28 ENCOUNTER — HOSPITAL ENCOUNTER (OUTPATIENT)
Dept: CARDIOLOGY | Facility: CLINIC | Age: 63
Discharge: HOME | End: 2018-11-28
Attending: INTERNAL MEDICINE | Admitting: INTERNAL MEDICINE
Payer: COMMERCIAL

## 2018-11-28 DIAGNOSIS — I1A.0 RESISTANT HYPERTENSION: ICD-10-CM

## 2018-11-28 PROCEDURE — 93306 TTE W/DOPPLER COMPLETE: CPT | Mod: 26 | Performed by: INTERNAL MEDICINE

## 2018-11-28 PROCEDURE — 93306 TTE W/DOPPLER COMPLETE: CPT

## 2018-11-28 NOTE — TELEPHONE ENCOUNTER
"Dr. Guzmán-Please review.  It is writer's understanding patient is also seeing cardiology.  Do you recommend increase of Amlodipine to 10 mg daily?  The 5 mg dose recently refilled on 11/23/18.    Per 10/26/18 cardiology visit:  \"1.  I completely agree with Dr. Guzmán's management of the patient's hypertension.  The next step would be up-titration of amlodipine from 5 to10 mg daily.  The patient's goal blood pressure is less than 140/90 mmHg.  Given the long duration of disease burden he had, it would preferably be even lower.  If it remains uncontrolled on the higher dose of the amlodipine, I would increase the lisinopril to 40 mg daily.\"      Thank you!  TOYA Van, BSN, RN    "

## 2018-11-28 NOTE — TELEPHONE ENCOUNTER
RN  -Can we check with pharmacy? He got his 90 days supply recently. It must be duplicate.    Reception - Also please notify patient to come for BP recheck - MA only is fine.

## 2018-11-29 NOTE — TELEPHONE ENCOUNTER
Writer spoke with Patient. Patient did  Rx for amlodipine. Patient is currently taking 10 mg of Amlodipine-as patient states was directed by Cardiologist.    Patient has BP cuff at home and will send mychart with week's worth of BPs.     Thanks! Maribell Figueredo RN

## 2018-11-30 ENCOUNTER — OFFICE VISIT (OUTPATIENT)
Dept: CARDIOLOGY | Facility: CLINIC | Age: 63
End: 2018-11-30
Attending: INTERNAL MEDICINE
Payer: COMMERCIAL

## 2018-11-30 VITALS
HEART RATE: 108 BPM | SYSTOLIC BLOOD PRESSURE: 140 MMHG | HEIGHT: 71 IN | WEIGHT: 199.3 LBS | DIASTOLIC BLOOD PRESSURE: 80 MMHG | OXYGEN SATURATION: 99 % | BODY MASS INDEX: 27.9 KG/M2

## 2018-11-30 DIAGNOSIS — I1A.0 RESISTANT HYPERTENSION: Primary | ICD-10-CM

## 2018-11-30 PROCEDURE — 99214 OFFICE O/P EST MOD 30 MIN: CPT | Performed by: INTERNAL MEDICINE

## 2018-11-30 RX ORDER — AMLODIPINE BESYLATE 10 MG/1
10 TABLET ORAL DAILY
COMMUNITY
End: 2018-11-30

## 2018-11-30 RX ORDER — LISINOPRIL AND HYDROCHLOROTHIAZIDE 12.5; 2 MG/1; MG/1
2 TABLET ORAL EVERY MORNING
Qty: 200 TABLET | Refills: 4 | Status: SHIPPED | OUTPATIENT
Start: 2018-11-30 | End: 2019-10-14

## 2018-11-30 RX ORDER — AMLODIPINE BESYLATE 5 MG/1
TABLET ORAL
Qty: 90 TABLET | Refills: 0 | OUTPATIENT
Start: 2018-11-30

## 2018-11-30 RX ORDER — AMLODIPINE BESYLATE 10 MG/1
5 TABLET ORAL DAILY
Qty: 90 TABLET | Refills: 0 | Status: SHIPPED | OUTPATIENT
Start: 2018-11-30 | End: 2018-11-30

## 2018-11-30 RX ORDER — ATENOLOL 100 MG/1
100 TABLET ORAL DAILY
Qty: 100 TABLET | Refills: 4 | Status: SHIPPED | OUTPATIENT
Start: 2018-11-30 | End: 2019-10-14

## 2018-11-30 RX ORDER — AMLODIPINE BESYLATE 10 MG/1
10 TABLET ORAL DAILY
Qty: 100 TABLET | Refills: 4 | Status: SHIPPED | OUTPATIENT
Start: 2018-11-30 | End: 2019-10-14

## 2018-11-30 NOTE — PROGRESS NOTES
"Service Date: 11/30/2018      PRIMARY CARE PROVIDER:  Chance Guzmán MD       REASONS FOR VISIT:   1.  Followup of resistant hypertension following medication changes.   2.  Review test results.      HISTORY OF PRESENT ILLNESS:    Mr. Bowman is known to me from his recent visit on 10/26/2018 for resistant hypertension.  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      1. Resistant hypertension.    Diagnosed since his early 40s.  See details in my initial consult note from 10/26/2018.  He watches dietary sodium intake, exercises regularly and is slim. A big contributor is the patient's self-endorsed high anxiety.  On his last visit, his BP was 164/88 (190/85 in August).  I had advised he increase his amlodipine from 5 to10 mg daily while continuing the atenolol 100 mg and lisinopril/hydrochlorothiazide (40/25 mg) combination.  With these changes, his blood pressure is improved at 140/80 mmHg, but he was very anxious and agitated today.  In his own words, \"I feel really mad about having to be in the clinic, although all of you have been great.\"  Tolerating the higher dose amlodipine well without side effects.     /80, pulse 108 BPM.  I rechecked his blood pressure.      Labs:  Sodium 137, potassium 4.0, BUN 12, creatinine 0.9 with an estimated GFR of 83.      His echocardiogram done this week shows normal biventricular systolic function, normal wall thickness, LVEF 60%-65%.  Trileaflet aortic valve.  No significant valve disease.  Ascending aortic root 3.3 cm.      Renal ultrasound showed normal renal size and no significant renal artery stenosis.      CURRENT MEDICATIONS:   1.  Amlodipine 10 mg daily.   2.  Aspirin 81 mg daily.   3.  Atenolol 100 mg daily.   4.  Lisinopril/hydrochlorothiazide 20/12.5 mg 2 tablets in morning.   5.  Vitamin D.   6.  Over-the-counter fish oil.      ALLERGIES:  No known allergies.      VITAL SIGNS:  /80  " "Pulse 108  Ht 1.803 m (5' 11\")  Wt 90.4 kg (199 lb 4.8 oz)  SpO2 99%  BMI 27.8 kg/m2.       DIAGNOSES:   1.  Resistant hypertension.   2.  Significant anxiety in the healthcare setting.      ASSESSMENT/PLAN:    Evi has had a good therapeutic response to the up-titration of the amlodipine dosage.  It is very apparent and also by the patient's own endorsement that anxiety is a big player in his life.  He told me today, \"I really feel extremely mad about all this, and I'm in a very angry mood.\"  I suspect his true blood pressure outside of the healthcare setting is significantly lower.     1.  Renewed prescriptions for all his antihypertensive medications.   2.  No additional changes today.  I reassured the patient about test results.   3.  He will monitor his blood pressure at home and follow up with his primary provider, Dr. Guzmán, in a couple of months.   4.  See Cardiology as needed.      cc:   Chance Guzmán MD   [address] WIL WATSON MD             D: 2018   T: 2018   MT: john      Name:     EVI REYES   MRN:      [id number]        Account:      [id number]   :      1955           Service Date: 2018      Document: [id number]      "

## 2018-11-30 NOTE — LETTER
11/30/2018    Chance Guzmán MD, MD  [address] [place] [address]    RE: Dion Bowman       Dear Colleague,    I had the pleasure of seeing Dion Bowman in the [facility].    Clinic visit note dictated. Dictation reference number - [id number]          CURRENT MEDICATIONS:  Current Outpatient Prescriptions   Medication Sig Dispense Refill     amLODIPine (NORVASC) 10 MG tablet Take 1 tablet (10 mg) by mouth daily 100 tablet 4     ASPIRIN 81 MG OR TABS ONE DAILY 100 3     atenolol (TENORMIN) 100 MG tablet Take 1 tablet (100 mg) by mouth daily 100 tablet 4     FISH OIL 1200 MG OR CAPS 2 caps every other day       lisinopril-hydrochlorothiazide (PRINZIDE/ZESTORETIC) 20-12.5 MG tablet Take 2 tablets by mouth every morning 200 tablet 4     VITAMIN D, CHOLECALCIFEROL, PO Take 1,000 Units by mouth daily       [DISCONTINUED] amLODIPine (NORVASC) 10 MG tablet Take 0.5 tablets (5 mg) by mouth daily (Patient taking differently: Take 10 mg by mouth daily ) 90 tablet 0     [DISCONTINUED] amLODIPine (NORVASC) 10 MG tablet Take 10 mg by mouth daily       [DISCONTINUED] amLODIPine (NORVASC) 5 MG tablet TAKE 1 TABLET BY MOUTH EVERY DAY 90 tablet 0     [DISCONTINUED] atenolol (TENORMIN) 100 MG tablet Take 1 tablet (100 mg) by mouth daily 90 tablet 0     [DISCONTINUED] lisinopril-hydrochlorothiazide (PRINZIDE/ZESTORETIC) 20-12.5 MG per tablet TAKE 2 TABLETS BY MOUTH EVERY DAY IN THE MORNING 180 tablet 0         ALLERGIES:  Allergies   Allergen Reactions     No Known Drug Allergies        PAST MEDICAL HISTORY:    Past Medical History:   Diagnosis Date     CARDIOVASCULAR SCREENING; LDL GOAL LESS THAN 130 5/9/2010     Hypertension goal BP (blood pressure) < 140/90 12/16/2010     Impaired fasting blood sugar 12/21/2010       PAST SURGICAL HISTORY:    Past Surgical History:   Procedure Laterality Date     HC COLONOSCOPY THRU STOMA, DIAGNOSTIC  3/09/2009    Negative - Due in 2019     HERNIA REPAIR,  INGUINAL RT/LT  1/26/2006    Left Inguinal Hernia       FAMILY HISTORY:    Family History   Problem Relation Age of Onset     Hypertension Mother      Alcohol/Drug Mother      alcohol     Alcohol/Drug Father      alcohol     Cancer Father      lung     Cancer Sister      lung       SOCIAL HISTORY:    Social History     Social History     Marital status:      Spouse name: Celi     Number of children: 2     Years of education: 16     Occupational History           [employer]      [employer]     Social History Main Topics     Smoking status: Never Smoker     Smokeless tobacco: Never Used     Alcohol use Yes      Comment: 1 drink every two weeks. During Football Season     Drug use: No     Sexual activity: Yes     Partners: Female     Other Topics Concern      Service No     Blood Transfusions No     Caffeine Concern No     Ice Tea - 2 glasses a day     Occupational Exposure No     Hobby Hazards No     Sleep Concern No     Stress Concern No     Weight Concern No     Special Diet No     Back Care No     Exercise Yes     Stair Master, Ellipitical, weights - 6 days a week     Bike Helmet No     Seat Belt Yes     Self-Exams Yes     Parent/Sibling W/ Cabg, Mi Or Angioplasty Before 65f 55m? No     Social History Narrative    Balanced Diet - Yes    Osteoporosis Preventative measures-  Dairy servings per day: 2-3 servings daily    Regular Exercise -  Yes Describe stair master, walking, biking    Dental Exam up - YES - Date: 2007    Eye Exam - YES - Date: 2005    Self Testicular Exam -  No    Do you have any concerns about STD's -  No    Abuse: Current or Past (Physical, Sexual or Emotional)- No    Do you feel safe in your environment - Yes    Guns stored in the home - No    Sunscreen used - No    Seatbelts used - Yes    Lipids - YES - Date: 12-12-06    Glucose -  YES - Date: 12-12-06    Colon Cancer Screening - No    Hemoccults - NO    PSA - YES - Date: 12-12-06    Digital Rectal  "Exam - NO    Immunizations reviewed and up to date - Yes, last TD was 9-14-04    Tiffanie Moralez MA       PHYSICAL EXAM:    Vitals: /80  Pulse 108  Ht 1.803 m (5' 11\")  Wt 90.4 kg (199 lb 4.8 oz)  SpO2 99%  BMI 27.8 kg/m2  Wt Readings from Last 5 Encounters:   11/30/18 90.4 kg (199 lb 4.8 oz)   10/26/18 89.4 kg (197 lb 1.6 oz)   06/08/18 89.7 kg (197 lb 12 oz)   01/10/18 91.2 kg (201 lb)   08/11/17 86.5 kg (190 lb 12 oz)       Thank you for allowing me to participate in the care of your patient.      Sincerely,     Claudette Diallo MD     [facility]    cc:   Claudette Diallo MD  [address] [place] [id number]        "

## 2018-11-30 NOTE — MR AVS SNAPSHOT
"              After Visit Summary   11/30/2018    Dion Bowman    MRN: [id number]           Patient Information     Date Of Birth          1955        Visit Information        Provider Department      11/30/2018 8:15 AM Claudette Diallo MD [facility]        Today's Diagnoses     Resistant hypertension    -  1       Follow-ups after your visit        Who to contact     If you have questions or need follow up information about today's clinic visit or your schedule please contact [facility] directly at [phone].  Normal or non-critical lab and imaging results will be communicated to you by TriplePulsehart, letter or phone within 4 business days after the clinic has received the results. If you do not hear from us within 7 days, please contact the clinic through AirClict or phone. If you have a critical or abnormal lab result, we will notify you by phone as soon as possible.  Submit refill requests through [employer]PNR or call your pharmacy and they will forward the refill request to us. Please allow 3 business days for your refill to be completed.          Additional Information About Your Visit        MyChart Information     [employer] gives you secure access to your electronic health record. If you see a primary care provider, you can also send messages to your care team and make appointments. If you have questions, please call your primary care clinic.  If you do not have a primary care provider, please call [phone] and they will assist you.        Care EveryWhere ID     This is your Care EveryWhere ID. This could be used by other organizations to access your [place] medical records  [id number]        Your Vitals Were     Pulse Height Pulse Oximetry BMI (Body Mass Index)          108 1.803 m (5' 11\") 99% 27.8 kg/m2         Blood Pressure from Last 3 Encounters:   11/30/18 140/80   10/26/18 164/88   06/08/18 190/85    Weight from Last 3 " Encounters:   11/30/18 90.4 kg (199 lb 4.8 oz)   10/26/18 89.4 kg (197 lb 1.6 oz)   06/08/18 89.7 kg (197 lb 12 oz)              We Performed the Following     Follow-Up with Cardiologist          Today's Medication Changes          These changes are accurate as of 11/30/18  9:13 AM.  If you have any questions, ask your nurse or doctor.               These medicines have changed or have updated prescriptions.        Dose/Directions    amLODIPine 10 MG tablet   Commonly known as:  NORVASC   This may have changed:  Another medication with the same name was removed. Continue taking this medication, and follow the directions you see here.   Used for:  Resistant hypertension   Changed by:  Claudette Diallo MD        Dose:  10 mg   Take 1 tablet (10 mg) by mouth daily   Quantity:  100 tablet   Refills:  4       lisinopril-hydrochlorothiazide 20-12.5 MG tablet   Commonly known as:  PRINZIDE/ZESTORETIC   This may have changed:    - how much to take  - how to take this  - when to take this  - additional instructions   Changed by:  Claudette Diallo MD        Dose:  2 tablet   Take 2 tablets by mouth every morning   Quantity:  200 tablet   Refills:  4            Where to get your medicines      These medications were sent to [place] [address] [place] [facility] - [address] [address]     Phone:  [phone]     amLODIPine 10 MG tablet    atenolol 100 MG tablet    lisinopril-hydrochlorothiazide 20-12.5 MG tablet                Primary Care Provider Office Phone # Fax #    Chance Terra Guzmán [phone] [address] [place] [address]        Equal Access to Services     [facility] Hospital[facility] : Rhina Schafer, grace solorzano, bita yu. So [facility] [phone].    ATENCIÓN: Si habla español, tiene a lemus disposición servicios gratuitos de asistencia lingüística. Llame al  [phone].    We comply with applicable federal civil rights laws and [place] laws. We do not discriminate on the basis of race, color, national origin, age, disability, sex, sexual orientation, or gender identity.            Thank you!     Thank you for choosing [facility]  for your care. Our goal is always to provide you with excellent care. Hearing back from our patients is one way we can continue to improve our services. Please take a few minutes to complete the written survey that you may receive in the mail after your visit with us. Thank you!             Your Updated Medication List - Protect others around you: Learn how to safely use, store and throw away your medicines at www.disposemymeds.org.          This list is accurate as of 11/30/18  9:13 AM.  Always use your most recent med list.                   Brand Name Dispense Instructions for use Diagnosis    amLODIPine 10 MG tablet    NORVASC    100 tablet    Take 1 tablet (10 mg) by mouth daily    Resistant hypertension       aspirin 81 MG tablet    ASA    100    ONE DAILY    Essential hypertension, benign       atenolol 100 MG tablet    TENORMIN    100 tablet    Take 1 tablet (100 mg) by mouth daily        fish Oil 1200 MG capsule      2 caps every other day        lisinopril-hydrochlorothiazide 20-12.5 MG tablet    PRINZIDE/ZESTORETIC    200 tablet    Take 2 tablets by mouth every morning        VITAMIN D (CHOLECALCIFEROL) PO      Take 1,000 Units by mouth daily

## 2018-11-30 NOTE — PROGRESS NOTES
Clinic visit note dictated. Dictation reference number - [id number]          CURRENT MEDICATIONS:  Current Outpatient Prescriptions   Medication Sig Dispense Refill     amLODIPine (NORVASC) 10 MG tablet Take 1 tablet (10 mg) by mouth daily 100 tablet 4     ASPIRIN 81 MG OR TABS ONE DAILY 100 3     atenolol (TENORMIN) 100 MG tablet Take 1 tablet (100 mg) by mouth daily 100 tablet 4     FISH OIL 1200 MG OR CAPS 2 caps every other day       lisinopril-hydrochlorothiazide (PRINZIDE/ZESTORETIC) 20-12.5 MG tablet Take 2 tablets by mouth every morning 200 tablet 4     VITAMIN D, CHOLECALCIFEROL, PO Take 1,000 Units by mouth daily       [DISCONTINUED] amLODIPine (NORVASC) 10 MG tablet Take 0.5 tablets (5 mg) by mouth daily (Patient taking differently: Take 10 mg by mouth daily ) 90 tablet 0     [DISCONTINUED] amLODIPine (NORVASC) 10 MG tablet Take 10 mg by mouth daily       [DISCONTINUED] amLODIPine (NORVASC) 5 MG tablet TAKE 1 TABLET BY MOUTH EVERY DAY 90 tablet 0     [DISCONTINUED] atenolol (TENORMIN) 100 MG tablet Take 1 tablet (100 mg) by mouth daily 90 tablet 0     [DISCONTINUED] lisinopril-hydrochlorothiazide (PRINZIDE/ZESTORETIC) 20-12.5 MG per tablet TAKE 2 TABLETS BY MOUTH EVERY DAY IN THE MORNING 180 tablet 0         ALLERGIES:  Allergies   Allergen Reactions     No Known Drug Allergies        PAST MEDICAL HISTORY:    Past Medical History:   Diagnosis Date     CARDIOVASCULAR SCREENING; LDL GOAL LESS THAN 130 5/9/2010     Hypertension goal BP (blood pressure) < 140/90 12/16/2010     Impaired fasting blood sugar 12/21/2010       PAST SURGICAL HISTORY:    Past Surgical History:   Procedure Laterality Date     HC COLONOSCOPY THRU STOMA, DIAGNOSTIC  3/09/2009    Negative - Due in 2019     HERNIA REPAIR, INGUINAL RT/LT  1/26/2006    Left Inguinal Hernia       FAMILY HISTORY:    Family History   Problem Relation Age of Onset     Hypertension Mother      Alcohol/Drug Mother      alcohol     Alcohol/Drug Father      alcohol  "    Cancer Father      lung     Cancer Sister      lung       SOCIAL HISTORY:    Social History     Social History     Marital status:      Spouse name: Celi     Number of children: 2     Years of education: 16     Occupational History           [employer]      [employer]     Social History Main Topics     Smoking status: Never Smoker     Smokeless tobacco: Never Used     Alcohol use Yes      Comment: 1 drink every two weeks. During Football Season     Drug use: No     Sexual activity: Yes     Partners: Female     Other Topics Concern      Service No     Blood Transfusions No     Caffeine Concern No     Ice Tea - 2 glasses a day     Occupational Exposure No     Hobby Hazards No     Sleep Concern No     Stress Concern No     Weight Concern No     Special Diet No     Back Care No     Exercise Yes     Stair Master, Ellipitical, weights - 6 days a week     Bike Helmet No     Seat Belt Yes     Self-Exams Yes     Parent/Sibling W/ Cabg, Mi Or Angioplasty Before 65f 55m? No     Social History Narrative    Balanced Diet - Yes    Osteoporosis Preventative measures-  Dairy servings per day: 2-3 servings daily    Regular Exercise -  Yes Describe stair master, walking, biking    Dental Exam up - YES - Date: 2007    Eye Exam - YES - Date: 2005    Self Testicular Exam -  No    Do you have any concerns about STD's -  No    Abuse: Current or Past (Physical, Sexual or Emotional)- No    Do you feel safe in your environment - Yes    Guns stored in the home - No    Sunscreen used - No    Seatbelts used - Yes    Lipids - YES - Date: 12-12-06    Glucose -  YES - Date: 12-12-06    Colon Cancer Screening - No    Hemoccults - NO    PSA - YES - Date: 12-12-06    Digital Rectal Exam - NO    Immunizations reviewed and up to date - Yes, last TD was 9-14-04    Tiffanie Moralez MA       PHYSICAL EXAM:    Vitals: /80  Pulse 108  Ht 1.803 m (5' 11\")  Wt 90.4 kg (199 lb 4.8 oz)  SpO2 99%  BMI " 27.8 kg/m2  Wt Readings from Last 5 Encounters:   11/30/18 90.4 kg (199 lb 4.8 oz)   10/26/18 89.4 kg (197 lb 1.6 oz)   06/08/18 89.7 kg (197 lb 12 oz)   01/10/18 91.2 kg (201 lb)   08/11/17 86.5 kg (190 lb 12 oz)

## 2018-11-30 NOTE — LETTER
"11/30/2018      Chance Guzmán MD, MD  [address] [place] [id number]      RE: Dion Bowman       Dear Colleague,    I had the pleasure of seeing Dion Bowman in the [facility].    Service Date: 11/30/2018      PRIMARY CARE PROVIDER:  Chance Guzmán MD       REASONS FOR VISIT:   1.  Followup of resistant hypertension following medication changes.   2.  Review test results.      HISTORY OF PRESENT ILLNESS:    Mr. Bowman is known to me from his recent visit on 10/26/2018 for resistant hypertension.  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      1. Resistant hypertension.    Diagnosed since his early 40s.  See details in my initial consult note from 10/26/2018.  He watches dietary sodium intake, exercises regularly and is slim. A big contributor is the patient's self-endorsed high anxiety.  On his last visit, his BP was 164/88 (190/85 in August).  I had advised he increase his amlodipine from 5 to10 mg daily while continuing the atenolol 100 mg and lisinopril/hydrochlorothiazide (40/25 mg) combination.  With these changes, his blood pressure is improved at 140/80 mmHg, but he was very anxious and agitated today.  In his own words, \"I feel really mad about having to be in the clinic, although all of you have been great.\"  Tolerating the higher dose amlodipine well without side effects.     /80, pulse 108 BPM.  I rechecked his blood pressure.      Labs:  Sodium 137, potassium 4.0, BUN 12, creatinine 0.9 with an estimated GFR of 83.      His echocardiogram done this week shows normal biventricular systolic function, normal wall thickness, LVEF 60%-65%.  Trileaflet aortic valve.  No significant valve disease.  Ascending aortic root 3.3 cm.      Renal ultrasound showed normal renal size and no significant renal artery stenosis.      CURRENT MEDICATIONS:   1.  Amlodipine 10 mg daily.   2.  Aspirin 81 mg " "daily.   3.  Atenolol 100 mg daily.   4.  Lisinopril/hydrochlorothiazide 20/12.5 mg 2 tablets in morning.   5.  Vitamin D.   6.  Over-the-counter fish oil.      ALLERGIES:  No known allergies.      VITAL SIGNS:  /80  Pulse 108  Ht 1.803 m (5' 11\")  Wt 90.4 kg (199 lb 4.8 oz)  SpO2 99%  BMI 27.8 kg/m2.       DIAGNOSES:   1.  Resistant hypertension.   2.  Significant anxiety in the healthcare setting.      ASSESSMENT/PLAN:    Evi has had a good therapeutic response to the up-titration of the amlodipine dosage.  It is very apparent and also by the patient's own endorsement that anxiety is a big player in his life.  He told me today, \"I really feel extremely mad about all this, and I'm in a very angry mood.\"  I suspect his true blood pressure outside of the healthcare setting is significantly lower.     1.  Renewed prescriptions for all his antihypertensive medications.   2.  No additional changes today.  I reassured the patient about test results.   3.  He will monitor his blood pressure at home and follow up with his primary provider, Dr. Guzmán, in a couple of months.   4.  See Cardiology as needed.      cc:   Chance Guzmán MD   [address] WIL WATSON MD             D: 2018   T: 2018   MT: john      Name:     EVI REYES   MRN:      [id number]        Account:      [id number]   :      1955           Service Date: 2018      Document: [id number]           Outpatient Encounter Prescriptions as of 2018   Medication Sig Dispense Refill     amLODIPine (NORVASC) 10 MG tablet Take 1 tablet (10 mg) by mouth daily 100 tablet 4     ASPIRIN 81 MG OR TABS ONE DAILY 100 3     atenolol (TENORMIN) 100 MG tablet Take 1 tablet (100 mg) by mouth daily 100 tablet 4     FISH OIL 1200 MG OR CAPS 2 caps every other day       lisinopril-hydrochlorothiazide (PRINZIDE/ZESTORETIC) 20-12.5 MG tablet Take 2 tablets by " mouth every morning 200 tablet 4     VITAMIN D, CHOLECALCIFEROL, PO Take 1,000 Units by mouth daily       [DISCONTINUED] amLODIPine (NORVASC) 10 MG tablet Take 0.5 tablets (5 mg) by mouth daily (Patient taking differently: Take 10 mg by mouth daily ) 90 tablet 0     [DISCONTINUED] amLODIPine (NORVASC) 10 MG tablet Take 10 mg by mouth daily       [DISCONTINUED] atenolol (TENORMIN) 100 MG tablet Take 1 tablet (100 mg) by mouth daily 90 tablet 0     [DISCONTINUED] lisinopril-hydrochlorothiazide (PRINZIDE/ZESTORETIC) 20-12.5 MG per tablet TAKE 2 TABLETS BY MOUTH EVERY DAY IN THE MORNING 180 tablet 0     No facility-administered encounter medications on file as of 11/30/2018.        Again, thank you for allowing me to participate in the care of your patient.      Sincerely,    Claudette Diallo MD     [facility]

## 2019-03-11 ENCOUNTER — TELEPHONE (OUTPATIENT)
Dept: FAMILY MEDICINE | Facility: CLINIC | Age: 64
End: 2019-03-11

## 2019-03-11 NOTE — TELEPHONE ENCOUNTER
Called pt and he will call back to schedule appt. Please sched. appt with PCP for BP follow up    Marissa Phillip CMA

## 2019-09-13 DIAGNOSIS — I10 UNCONTROLLED HYPERTENSION: ICD-10-CM

## 2019-09-13 NOTE — TELEPHONE ENCOUNTER
"Requested Prescriptions   Pending Prescriptions Disp Refills     amLODIPine (NORVASC) 10 MG tablet [Pharmacy Med Name: AMLODIPINE BESYLATE 10 MG TAB]  Last Written Prescription Date:  11/30/2018  Last Fill Quantity: 100 tabs,  # refills: 4   Last office visit: 6/8/2018 with prescribing provider:  Ramirez   Future Office Visit:     45 tablet 1     Sig: TAKE 1/2 TABLET BY MOUTH ONCE DAILY       Calcium Channel Blockers Protocol  Failed - 9/13/2019  1:43 AM        Failed - Blood pressure under 140/90 in past 12 months     BP Readings from Last 3 Encounters:   11/30/18 140/80   10/26/18 164/88   06/08/18 190/85                 Failed - Recent (12 mo) or future (30 days) visit within the authorizing provider's specialty     Patient had office visit in the last 12 months or has a visit in the next 30 days with authorizing provider or within the authorizing provider's specialty.  See \"Patient Info\" tab in inbasket, or \"Choose Columns\" in Meds & Orders section of the refill encounter.              Failed - Normal serum creatinine on file in past 12 months     Recent Labs   Lab Test 06/08/18  0843   CR 0.92             Passed - Medication is active on med list        Passed - Patient is age 18 or older          "

## 2019-09-15 RX ORDER — AMLODIPINE BESYLATE 10 MG/1
TABLET ORAL
Qty: 45 TABLET | Refills: 1 | OUTPATIENT
Start: 2019-09-15

## 2019-10-03 ENCOUNTER — HEALTH MAINTENANCE LETTER (OUTPATIENT)
Age: 64
End: 2019-10-03

## 2019-10-13 ASSESSMENT — ENCOUNTER SYMPTOMS
FREQUENCY: 0
DYSURIA: 0
EYE PAIN: 0
COUGH: 0
DIARRHEA: 0
HEADACHES: 0
HEMATOCHEZIA: 0
CHILLS: 0
NERVOUS/ANXIOUS: 0
SHORTNESS OF BREATH: 0
MYALGIAS: 0
PALPITATIONS: 0
DIZZINESS: 0
ABDOMINAL PAIN: 0
ARTHRALGIAS: 0
SORE THROAT: 0
HEARTBURN: 0
PARESTHESIAS: 0
HEMATURIA: 0
JOINT SWELLING: 0
NAUSEA: 0
FEVER: 0
WEAKNESS: 0
CONSTIPATION: 0

## 2019-10-14 ENCOUNTER — MYC MEDICAL ADVICE (OUTPATIENT)
Dept: FAMILY MEDICINE | Facility: CLINIC | Age: 64
End: 2019-10-14

## 2019-10-14 ENCOUNTER — TELEPHONE (OUTPATIENT)
Dept: FAMILY MEDICINE | Facility: CLINIC | Age: 64
End: 2019-10-14

## 2019-10-14 ENCOUNTER — OFFICE VISIT (OUTPATIENT)
Dept: FAMILY MEDICINE | Facility: CLINIC | Age: 64
End: 2019-10-14
Payer: COMMERCIAL

## 2019-10-14 VITALS
SYSTOLIC BLOOD PRESSURE: 138 MMHG | BODY MASS INDEX: 27.4 KG/M2 | HEART RATE: 70 BPM | TEMPERATURE: 97 F | OXYGEN SATURATION: 100 % | HEIGHT: 71 IN | DIASTOLIC BLOOD PRESSURE: 88 MMHG | RESPIRATION RATE: 16 BRPM | WEIGHT: 195.75 LBS

## 2019-10-14 DIAGNOSIS — Z13.6 SCREENING FOR CARDIOVASCULAR CONDITION: ICD-10-CM

## 2019-10-14 DIAGNOSIS — Z12.5 SCREENING PSA (PROSTATE SPECIFIC ANTIGEN): ICD-10-CM

## 2019-10-14 DIAGNOSIS — R97.20 HIGH PROSTATE SPECIFIC ANTIGEN (PSA): Primary | ICD-10-CM

## 2019-10-14 DIAGNOSIS — Z00.00 ROUTINE GENERAL MEDICAL EXAMINATION AT A HEALTH CARE FACILITY: Primary | ICD-10-CM

## 2019-10-14 DIAGNOSIS — Z23 NEED FOR PROPHYLACTIC VACCINATION AND INOCULATION AGAINST INFLUENZA: ICD-10-CM

## 2019-10-14 DIAGNOSIS — I1A.0 RESISTANT HYPERTENSION: ICD-10-CM

## 2019-10-14 DIAGNOSIS — Z12.11 SCREEN FOR COLON CANCER: ICD-10-CM

## 2019-10-14 LAB
ANION GAP SERPL CALCULATED.3IONS-SCNC: 9 MMOL/L (ref 3–14)
BUN SERPL-MCNC: 15 MG/DL (ref 7–30)
CALCIUM SERPL-MCNC: 8.9 MG/DL (ref 8.5–10.1)
CHLORIDE SERPL-SCNC: 100 MMOL/L (ref 94–109)
CHOLEST SERPL-MCNC: 222 MG/DL
CO2 SERPL-SCNC: 25 MMOL/L (ref 20–32)
CREAT SERPL-MCNC: 0.92 MG/DL (ref 0.66–1.25)
GFR SERPL CREATININE-BSD FRML MDRD: 88 ML/MIN/{1.73_M2}
GLUCOSE SERPL-MCNC: 113 MG/DL (ref 70–99)
HDLC SERPL-MCNC: 59 MG/DL
LDLC SERPL CALC-MCNC: 117 MG/DL
NONHDLC SERPL-MCNC: 163 MG/DL
POTASSIUM SERPL-SCNC: 4.2 MMOL/L (ref 3.4–5.3)
PSA SERPL-ACNC: 9.32 UG/L (ref 0–4)
SODIUM SERPL-SCNC: 134 MMOL/L (ref 133–144)
TRIGL SERPL-MCNC: 229 MG/DL

## 2019-10-14 PROCEDURE — 90471 IMMUNIZATION ADMIN: CPT | Performed by: FAMILY MEDICINE

## 2019-10-14 PROCEDURE — 90682 RIV4 VACC RECOMBINANT DNA IM: CPT | Performed by: FAMILY MEDICINE

## 2019-10-14 PROCEDURE — 80048 BASIC METABOLIC PNL TOTAL CA: CPT | Performed by: FAMILY MEDICINE

## 2019-10-14 PROCEDURE — 36415 COLL VENOUS BLD VENIPUNCTURE: CPT | Performed by: FAMILY MEDICINE

## 2019-10-14 PROCEDURE — G0103 PSA SCREENING: HCPCS | Performed by: FAMILY MEDICINE

## 2019-10-14 PROCEDURE — 99396 PREV VISIT EST AGE 40-64: CPT | Mod: 25 | Performed by: FAMILY MEDICINE

## 2019-10-14 PROCEDURE — 80061 LIPID PANEL: CPT | Performed by: FAMILY MEDICINE

## 2019-10-14 RX ORDER — ATENOLOL 100 MG/1
100 TABLET ORAL DAILY
Qty: 90 TABLET | Refills: 3 | Status: SHIPPED | OUTPATIENT
Start: 2019-10-14 | End: 2020-11-30

## 2019-10-14 RX ORDER — LISINOPRIL AND HYDROCHLOROTHIAZIDE 12.5; 2 MG/1; MG/1
2 TABLET ORAL EVERY MORNING
Qty: 180 TABLET | Refills: 3 | Status: SHIPPED | OUTPATIENT
Start: 2019-10-14 | End: 2020-11-30

## 2019-10-14 RX ORDER — AMLODIPINE BESYLATE 10 MG/1
10 TABLET ORAL DAILY
Qty: 90 TABLET | Refills: 3 | Status: SHIPPED | OUTPATIENT
Start: 2019-10-14 | End: 2020-11-30

## 2019-10-14 ASSESSMENT — ENCOUNTER SYMPTOMS
FEVER: 0
NAUSEA: 0
PALPITATIONS: 0
FREQUENCY: 0
HEADACHES: 0
HEARTBURN: 0
WEAKNESS: 0
HEMATOCHEZIA: 0
HEMATURIA: 0
ABDOMINAL PAIN: 0
PARESTHESIAS: 0
SORE THROAT: 0
SHORTNESS OF BREATH: 0
JOINT SWELLING: 0
CONSTIPATION: 0
MYALGIAS: 0
CHILLS: 0
ARTHRALGIAS: 0
EYE PAIN: 0
DYSURIA: 0
DIZZINESS: 0
NERVOUS/ANXIOUS: 0
COUGH: 0
DIARRHEA: 0

## 2019-10-14 ASSESSMENT — MIFFLIN-ST. JEOR: SCORE: 1709.01

## 2019-10-14 NOTE — PATIENT INSTRUCTIONS
Preventive Health Recommendations  Male Ages 50 - 64    Yearly exam:             See your health care provider every year in order to  o   Review health changes.   o   Discuss preventive care.    o   Review your medicines if your doctor has prescribed any.     Have a cholesterol test every 5 years, or more frequently if you are at risk for high cholesterol/heart disease.     Have a diabetes test (fasting glucose) every three years. If you are at risk for diabetes, you should have this test more often.     Have a colonoscopy at age 50, or have a yearly FIT test (stool test). These exams will check for colon cancer.      Talk with your health care provider about whether or not a prostate cancer screening test (PSA) is right for you.    You should be tested each year for STDs (sexually transmitted diseases), if you re at risk.     Shots: Get a flu shot each year. Get a tetanus shot every 10 years.     Nutrition:    Eat at least 5 servings of fruits and vegetables daily.     Eat whole-grain bread, whole-wheat pasta and brown rice instead of white grains and rice.     Get adequate Calcium and Vitamin D.     Lifestyle    Exercise for at least 150 minutes a week (30 minutes a day, 5 days a week). This will help you control your weight and prevent disease.     Limit alcohol to one drink per day.     No smoking.     Wear sunscreen to prevent skin cancer.     See your dentist every six months for an exam and cleaning.     See your eye doctor every 1 to 2 years.  Check your insurance coverage for SHINGRIX vaccine. It is a new shingles vaccine. It is very effective in cutting down chances of shingles. It is  2 shot series that is administered atleast 2 months apart. If it is covered, you may need to check where it is available as there is nation wide shortage of that vaccine. Some insurance covers it if it is given in the pharmacy. Check with your pharmacy if that is the case.

## 2019-10-14 NOTE — TELEPHONE ENCOUNTER
Writer called patient and reviewed message per Dr. Guzmán.    Patient verbalized understanding and in agreement with plan.    Patient requested referral information be sent via [employer] message.    Patient had no questions for writer.    [employer] message sent to patient.  TANAMY ZarateN, RN

## 2019-10-14 NOTE — RESULT ENCOUNTER NOTE
You should have received a phone call from our nurses to get into see a urologist.  Your PSA is high and it is twice as high compare to normal limit.     Your bad cholesterol (LDL) is slightly higher than normal and worse than last time. Ideally you should consider cholesterol medication if your numbers are not improving.     Your blood sugar is slightly abnormal and you are at high risk for developing diabetes if it persist. Active weight loss, regular exercise and healthy diet can help to improve your blood sugar.      Rest of your kidney function is fine.     Call me with questions.     Chance Guzmán MD  [facility]

## 2019-10-14 NOTE — PROGRESS NOTES
SUBJECTIVE:   CC: Dion Bowman is an 64 year old male who presents for preventative health visit.     Healthy Habits:     Getting at least 3 servings of Calcium per day:  Yes    Bi-annual eye exam:  Yes    Dental care twice a year:  Yes    Sleep apnea or symptoms of sleep apnea:  None    Diet:  Regular (no restrictions)    Frequency of exercise:  6-7 days/week    Duration of exercise:  45-60 minutes    Taking medications regularly:  Yes    Medication side effects:  None    PHQ-2 Total Score: 0    Additional concerns today:  Yes (bone hurts on left foot when exercising)    Today's PHQ-2 Score:   PHQ-2 ( 1999 Pfizer) 10/13/2019   Q1: Little interest or pleasure in doing things 0   Q2: Feeling down, depressed or hopeless 0   PHQ-2 Score 0   Q1: Little interest or pleasure in doing things Not at all   Q2: Feeling down, depressed or hopeless Not at all   PHQ-2 Score 0     Abuse: Current or Past(Physical, Sexual or Emotional)- No  Do you feel safe in your environment? Yes    Social History     Tobacco Use     Smoking status: Never Smoker     Smokeless tobacco: Never Used   Substance Use Topics     Alcohol use: Yes     Comment: 1 drink every two weeks. During Football Season     If you drink alcohol do you typically have >3 drinks per day or >7 drinks per week? No     No flowsheet data found.    Last PSA:   PSA   Date Value Ref Range Status   05/12/2017 5.59 (H) 0 - 4 ug/L Final     Comment:     Assay Method:  Chemiluminescence using Siemens Vista analyzer     Reviewed orders with patient. Reviewed health maintenance and updated orders accordingly - Yes     Reviewed and updated as needed this visit by clinical staff  Tobacco  Allergies  Meds  Med Hx  Surg Hx  Fam Hx  Soc Hx      Reviewed and updated as needed this visit by Provider    Mild toe pain with exercise.  Nothing major and does not need intervention.    Review of Systems   Constitutional: Negative for chills and fever.   HENT: Negative for congestion, ear  "pain, hearing loss and sore throat.    Eyes: Negative for pain and visual disturbance.   Respiratory: Negative for cough and shortness of breath.    Cardiovascular: Negative for chest pain, palpitations and peripheral edema.   Gastrointestinal: Negative for abdominal pain, constipation, diarrhea, heartburn, hematochezia and nausea.   Genitourinary: Negative for discharge, dysuria, frequency, genital sores, hematuria, impotence and urgency.   Musculoskeletal: Negative for arthralgias, joint swelling and myalgias.   Skin: Negative for rash.   Neurological: Negative for dizziness, weakness, headaches and paresthesias.   Psychiatric/Behavioral: Negative for mood changes. The patient is not nervous/anxious.      OBJECTIVE:   /88 (BP Location: Right arm, Patient Position: Sitting, Cuff Size: Adult Regular)   Pulse 70   Temp 97  F (36.1  C) (Oral)   Resp 16   Ht 1.81 m (5' 11.25\")   Wt 88.8 kg (195 lb 12 oz)   SpO2 100%   BMI 27.11 kg/m      Physical Exam  GENERAL: healthy, alert and no distress  EYES: Eyes grossly normal to inspection, PERRL and conjunctivae and sclerae normal  HENT: ear canals and TM's normal, nose and mouth without ulcers or lesions  NECK: no adenopathy, no asymmetry, masses, or scars and thyroid normal to palpation  RESP: lungs clear to auscultation - no rales, rhonchi or wheezes  CV: regular rate and rhythm, normal S1 S2, no S3 or S4, no murmur, click or rub, no peripheral edema and peripheral pulses strong  ABDOMEN: soft, nontender, no hepatosplenomegaly, no masses and bowel sounds normal   (male): normal male genitalia without lesions or urethral discharge, no hernia  MS: left foot small bunion.   SKIN: no suspicious lesions or rashes  NEURO: Normal strength and tone, mentation intact and speech normal  PSYCH: mentation appears normal, affect normal/bright    ASSESSMENT/PLAN:   1. Routine general medical examination at a health care facility       2. Need for prophylactic vaccination " "and inoculation against influenza     - INFLUENZA QUAD, RECOMBINANT, P-FREE (RIV4) (FLUBLOCK) [90848]  - Vaccine Administration, Initial [38362]    3. Resistant hypertension  Repeat blood pressure is better.  Been to cardiology in the past.  At this point will stick to the same dose of Rx.  - amLODIPine (NORVASC) 10 MG tablet; Take 1 tablet (10 mg) by mouth daily  Dispense: 90 tablet; Refill: 3  - atenolol (TENORMIN) 100 MG tablet; Take 1 tablet (100 mg) by mouth daily  Dispense: 90 tablet; Refill: 3  - lisinopril-hydrochlorothiazide (PRINZIDE/ZESTORETIC) 20-12.5 MG tablet; Take 2 tablets by mouth every morning  Dispense: 180 tablet; Refill: 3  - Basic metabolic panel    4. Screening for cardiovascular condition     - Lipid panel reflex to direct LDL Fasting    5. Screen for colon cancer     - GASTROENTEROLOGY ADULT REF PROCEDURE ONLY    6. Screening PSA  Comment - last time PSA was borderline high. Needs follow up.     COUNSELING:   Reviewed preventive health counseling, as reflected in patient instructions  Special attention given to:        Regular exercise       Healthy diet/nutrition       Vision screening       Hearing screening       Colon cancer screening       Prostate cancer screening    Estimated body mass index is 27.11 kg/m  as calculated from the following:    Height as of this encounter: 1.81 m (5' 11.25\").    Weight as of this encounter: 88.8 kg (195 lb 12 oz).     Weight management plan: Discussed healthy diet and exercise guidelines     reports that he has never smoked. He has never used smokeless tobacco.      Counseling Resources:  ATP IV Guidelines  Pooled Cohorts Equation Calculator  FRAX Risk Assessment  ICSI Preventive Guidelines  Dietary Guidelines for Americans, 2010  USDA's MyPlate  ASA Prophylaxis  Lung CA Screening    Chance Guzmán MD, MD  [facility]  "

## 2019-10-14 NOTE — TELEPHONE ENCOUNTER
Please call patient -his PSA is significantly higher than the last time and he should see a urologist to rule out prostate cancer.  I have done a referral for him. It generally takes some time getting in.    He should get [employer] message with rest of the result info.

## 2019-11-21 DIAGNOSIS — R97.20 ELEVATED PROSTATE SPECIFIC ANTIGEN (PSA): Primary | ICD-10-CM

## 2019-11-27 ENCOUNTER — OFFICE VISIT (OUTPATIENT)
Dept: UROLOGY | Facility: CLINIC | Age: 64
End: 2019-11-27
Payer: COMMERCIAL

## 2019-11-27 VITALS
DIASTOLIC BLOOD PRESSURE: 86 MMHG | HEART RATE: 76 BPM | BODY MASS INDEX: 27.3 KG/M2 | SYSTOLIC BLOOD PRESSURE: 124 MMHG | OXYGEN SATURATION: 99 % | HEIGHT: 71 IN | WEIGHT: 195 LBS

## 2019-11-27 DIAGNOSIS — R97.20 ELEVATED PROSTATE SPECIFIC ANTIGEN (PSA): ICD-10-CM

## 2019-11-27 LAB
ALBUMIN UR-MCNC: NEGATIVE MG/DL
APPEARANCE UR: CLEAR
BILIRUB UR QL STRIP: NEGATIVE
COLOR UR AUTO: YELLOW
GLUCOSE UR STRIP-MCNC: NEGATIVE MG/DL
HGB UR QL STRIP: ABNORMAL
KETONES UR STRIP-MCNC: NEGATIVE MG/DL
LEUKOCYTE ESTERASE UR QL STRIP: NEGATIVE
NITRATE UR QL: NEGATIVE
PH UR STRIP: 7 PH (ref 5–7)
SOURCE: ABNORMAL
SP GR UR STRIP: 1.02 (ref 1–1.03)
UROBILINOGEN UR STRIP-ACNC: 0.2 EU/DL (ref 0.2–1)

## 2019-11-27 PROCEDURE — 99204 OFFICE O/P NEW MOD 45 MIN: CPT | Performed by: UROLOGY

## 2019-11-27 PROCEDURE — 81003 URINALYSIS AUTO W/O SCOPE: CPT | Performed by: UROLOGY

## 2019-11-27 ASSESSMENT — MIFFLIN-ST. JEOR: SCORE: 1701.64

## 2019-11-27 ASSESSMENT — PAIN SCALES - GENERAL: PAINLEVEL: NO PAIN (0)

## 2019-11-27 NOTE — LETTER
11/27/2019       RE: Dion Bowman  [address]  [facility] [address]     Dear Colleague,    Thank you for referring your patient, Dion Bowman, to the [facility] [place] at [facility]. Please see a copy of my visit note below.    History: It is a pleasure to see this very pleasant 64-year-old gentleman in initial consultation today at the request of his personal physician.  I am seeing him because of an elevated PSA.  Results for DION BOWMAN (MRN [id number]) as of 11/27/2019 09:42   Ref. Range 10/14/2019 09:11   PSA Latest Ref Range: 0 - 4 ug/L 9.32 (H)   The only significant additional medical issue is treatment for hypertension  He is voiding well without evidence of gross hematuria.  There is no apparent family history of prostate cancer    Past Medical History:   Diagnosis Date     CARDIOVASCULAR SCREENING; LDL GOAL LESS THAN 130 5/9/2010     Hypertension goal BP (blood pressure) < 140/90 12/16/2010     Impaired fasting blood sugar 12/21/2010       Past Surgical History:   Procedure Laterality Date     HC COLONOSCOPY THRU STOMA, DIAGNOSTIC  3/09/2009    Negative - Due in 2019     HERNIA REPAIR, INGUINAL RT/LT  1/26/2006    Left Inguinal Hernia       Family History   Problem Relation Age of Onset     Hypertension Mother      Alcohol/Drug Mother         alcohol     Alcohol/Drug Father         alcohol     Cancer Father         lung     Cancer Sister         lung       Social History     Socioeconomic History     Marital status:      Spouse name: Celi     Number of children: 2     Years of education: 16     Highest education level: Not on file   Occupational History     Occupation:      Comment: [employer]     Employer: [employer]   Social Needs     Financial resource strain: Not on file     Food insecurity:     Worry: Not on file     Inability: Not on file     Transportation needs:     Medical: Not on  file     Non-medical: Not on file   Tobacco Use     Smoking status: Never Smoker     Smokeless tobacco: Never Used   Substance and Sexual Activity     Alcohol use: Yes     Comment: 1 drink every two weeks. During Football Season     Drug use: No     Sexual activity: Yes     Partners: Female   Lifestyle     Physical activity:     Days per week: Not on file     Minutes per session: Not on file     Stress: Not on file   Relationships     Social connections:     Talks on phone: Not on file     Gets together: Not on file     Attends Sabianism service: Not on file     Active member of club or organization: Not on file     Attends meetings of clubs or organizations: Not on file     Relationship status: Not on file     Intimate partner violence:     Fear of current or ex partner: Not on file     Emotionally abused: Not on file     Physically abused: Not on file     Forced sexual activity: Not on file   Other Topics Concern      Service No     Blood Transfusions No     Caffeine Concern No     Comment: Ice Tea - 2 glasses a day     Occupational Exposure No     Hobby Hazards No     Sleep Concern No     Stress Concern No     Weight Concern No     Special Diet No     Back Care No     Exercise Yes     Comment: Stair Master, Ellipitical, weights - 6 days a week     Bike Helmet No     Seat Belt Yes     Self-Exams Yes     Parent/sibling w/ CABG, MI or angioplasty before 65F 55M? No   Social History Narrative    Balanced Diet - Yes    Osteoporosis Preventative measures-  Dairy servings per day: 2-3 servings daily    Regular Exercise -  Yes Describe stair master, walking, biking    Dental Exam up - YES - Date: 2007    Eye Exam - YES - Date: 2005    Self Testicular Exam -  No    Do you have any concerns about STD's -  No    Abuse: Current or Past (Physical, Sexual or Emotional)- No    Do you feel safe in your environment - Yes    Guns stored in the home - No    Sunscreen used - No    Seatbelts used - Yes    Lipids - YES - Date:  "12-12-06    Glucose -  YES - Date: 12-12-06    Colon Cancer Screening - No    Hemoccults - NO    PSA - YES - Date: 12-12-06    Digital Rectal Exam - NO    Immunizations reviewed and up to date - Yes, last TD was 9-14-04    Tiffanie Moralez MA       Current Outpatient Medications   Medication Sig Dispense Refill     amLODIPine (NORVASC) 10 MG tablet Take 1 tablet (10 mg) by mouth daily 90 tablet 3     ASPIRIN 81 MG OR TABS ONE DAILY 100 3     atenolol (TENORMIN) 100 MG tablet Take 1 tablet (100 mg) by mouth daily 90 tablet 3     FISH OIL 1200 MG OR CAPS 2 caps every other day       lisinopril-hydrochlorothiazide (PRINZIDE/ZESTORETIC) 20-12.5 MG tablet Take 2 tablets by mouth every morning 180 tablet 3     VITAMIN D, CHOLECALCIFEROL, PO Take 1,000 Units by mouth daily         Review Of Systems:  Skin: negative  Eyes: negative  Ears/Nose/Throat: negative  Respiratory: No shortness of breath, dyspnea on exertion, cough, or hemoptysis  Cardiovascular: Hypertension  Gastrointestinal: negative  Genitourinary: negative  Musculoskeletal: negative  Neurologic: negative  Psychiatric: negative  Hematologic/Lymphatic/Immunologic: negative  Endocrine: negative    Exam:  /86 (BP Location: Right arm, Patient Position: Sitting, Cuff Size: Adult Regular)   Pulse 76   Ht 1.803 m (5' 11\")   Wt 88.5 kg (195 lb)   SpO2 99%   BMI 27.20 kg/m       General Impression: Very pleasant gentleman in no acute distress, well-oriented in time place and person.    Mental status.  Normal    HEENT: There is no clinical evidence of jaundice on examination of conjunctiva.  Extraocular eye movements are normal.  Mucous membranes are unremarkable.  Neck and thyroid glands are normal    Skin: Skin is otherwise normal to examination    Lymph Nodes: There is no cervical or inguinal lymphadenopathy    Respiratory System: There is a normal respiratory cycle on the chest is clear to both auscultation and percussion    Cardiovascular: The heart sounds " are normal, no murmurs are heard, the pulse is regular, the peripheral pulses are palpable.  There is no evidence of any significant pitting peripheral edema    Abdominal: The abdomen is not obese, there are no palpable masses there are no areas of tenderness, guarding or rebound, there is no evidence of inguinal hernia    Extremities: The extremities are otherwise unremarkable    Back and Flank: There is no evidence of kyphosis, scoliosis or lordosis.  There is no tenderness over the spine or over the renal angles    Genital: The penis is circumcised with a normal size meatus.  Both testes are descended into the scrotum with the right testicle slightly smaller than the left but with no other remarkable features on the genitalia    Rectal: Good sphincter tone, normal perianal sensation.  Smooth rectal mucosa without hemorrhoids or fissures.  Smooth soft mildly enlarged prostate but without evidence of tenderness, bogginess or nodules.  Seminal vesicles.  Not palpable.  Perineum is otherwise normal to examination    Neurologic: There are no focal abnormal clinical neurological signs in the central, peripheral nervous systems    Impression: The patient has a significantly elevated PSA.  There is no family history of prostate cancer there are no previous PSA tests have not been done.  I had a careful discussion with him today about PSA, about his nonspecificity and in addition to that a careful discussion about prostate cancer explaining the relationship between the incidence of mortality of prostate cancer, how we treat prostate cancer and increasing significance of active surveillance now for management of low-grade prostate cancer.  I like to arrange for a T3 MRI of the prostate to give us more information about the prostate; based on the results of this we may need to consider MRI fusion biopsy if there are areas in the gland that are suspicious.  I did very carefully go over all of these issues in detail with the  "patient today including a lengthy discussion about prostate cancer.  I answered all his questions    Plan: T3 MRI prostate and see me after    \"This dictation was performed with voice recognition software and may contain errors,  omissions and inadvertent word substitution.\"      Again, thank you for allowing me to participate in the care of your patient.      Sincerely,    Bakari Mckeon MD      "

## 2019-11-27 NOTE — NURSING NOTE
Chief Complaint   Patient presents with     Elevated PSA     patient is here to discuss elevated PSA.      Bushra Handy, CMA

## 2019-11-27 NOTE — PROGRESS NOTES
History: It is a pleasure to see this very pleasant 64-year-old gentleman in initial consultation today at the request of his personal physician.  I am seeing him because of an elevated PSA.  Results for EVI REYES (MRN [id number]) as of 11/27/2019 09:42   Ref. Range 10/14/2019 09:11   PSA Latest Ref Range: 0 - 4 ug/L 9.32 (H)   The only significant additional medical issue is treatment for hypertension  He is voiding well without evidence of gross hematuria.  There is no apparent family history of prostate cancer    Past Medical History:   Diagnosis Date     CARDIOVASCULAR SCREENING; LDL GOAL LESS THAN 130 5/9/2010     Hypertension goal BP (blood pressure) < 140/90 12/16/2010     Impaired fasting blood sugar 12/21/2010       Past Surgical History:   Procedure Laterality Date     HC COLONOSCOPY THRU STOMA, DIAGNOSTIC  3/09/2009    Negative - Due in 2019     HERNIA REPAIR, INGUINAL RT/LT  1/26/2006    Left Inguinal Hernia       Family History   Problem Relation Age of Onset     Hypertension Mother      Alcohol/Drug Mother         alcohol     Alcohol/Drug Father         alcohol     Cancer Father         lung     Cancer Sister         lung       Social History     Socioeconomic History     Marital status:      Spouse name: Celi     Number of children: 2     Years of education: 16     Highest education level: Not on file   Occupational History     Occupation:      Comment: [employer]     Employer: [employer]   Social Needs     Financial resource strain: Not on file     Food insecurity:     Worry: Not on file     Inability: Not on file     Transportation needs:     Medical: Not on file     Non-medical: Not on file   Tobacco Use     Smoking status: Never Smoker     Smokeless tobacco: Never Used   Substance and Sexual Activity     Alcohol use: Yes     Comment: 1 drink every two weeks. During Football Season     Drug use: No     Sexual activity: Yes     Partners: Female   Lifestyle      Physical activity:     Days per week: Not on file     Minutes per session: Not on file     Stress: Not on file   Relationships     Social connections:     Talks on phone: Not on file     Gets together: Not on file     Attends Amish service: Not on file     Active member of club or organization: Not on file     Attends meetings of clubs or organizations: Not on file     Relationship status: Not on file     Intimate partner violence:     Fear of current or ex partner: Not on file     Emotionally abused: Not on file     Physically abused: Not on file     Forced sexual activity: Not on file   Other Topics Concern      Service No     Blood Transfusions No     Caffeine Concern No     Comment: Ice Tea - 2 glasses a day     Occupational Exposure No     Hobby Hazards No     Sleep Concern No     Stress Concern No     Weight Concern No     Special Diet No     Back Care No     Exercise Yes     Comment: Stair Master, Ellipitical, weights - 6 days a week     Bike Helmet No     Seat Belt Yes     Self-Exams Yes     Parent/sibling w/ CABG, MI or angioplasty before 65F 55M? No   Social History Narrative    Balanced Diet - Yes    Osteoporosis Preventative measures-  Dairy servings per day: 2-3 servings daily    Regular Exercise -  Yes Describe stair master, walking, biking    Dental Exam up - YES - Date: 2007    Eye Exam - YES - Date: 2005    Self Testicular Exam -  No    Do you have any concerns about STD's -  No    Abuse: Current or Past (Physical, Sexual or Emotional)- No    Do you feel safe in your environment - Yes    Guns stored in the home - No    Sunscreen used - No    Seatbelts used - Yes    Lipids - YES - Date: 12-12-06    Glucose -  YES - Date: 12-12-06    Colon Cancer Screening - No    Hemoccults - NO    PSA - YES - Date: 12-12-06    Digital Rectal Exam - NO    Immunizations reviewed and up to date - Yes, last TD was 9-14-04    Tiffanie Moralez MA       Current Outpatient Medications   Medication Sig Dispense  "Refill     amLODIPine (NORVASC) 10 MG tablet Take 1 tablet (10 mg) by mouth daily 90 tablet 3     ASPIRIN 81 MG OR TABS ONE DAILY 100 3     atenolol (TENORMIN) 100 MG tablet Take 1 tablet (100 mg) by mouth daily 90 tablet 3     FISH OIL 1200 MG OR CAPS 2 caps every other day       lisinopril-hydrochlorothiazide (PRINZIDE/ZESTORETIC) 20-12.5 MG tablet Take 2 tablets by mouth every morning 180 tablet 3     VITAMIN D, CHOLECALCIFEROL, PO Take 1,000 Units by mouth daily         Review Of Systems:  Skin: negative  Eyes: negative  Ears/Nose/Throat: negative  Respiratory: No shortness of breath, dyspnea on exertion, cough, or hemoptysis  Cardiovascular: Hypertension  Gastrointestinal: negative  Genitourinary: negative  Musculoskeletal: negative  Neurologic: negative  Psychiatric: negative  Hematologic/Lymphatic/Immunologic: negative  Endocrine: negative    Exam:  /86 (BP Location: Right arm, Patient Position: Sitting, Cuff Size: Adult Regular)   Pulse 76   Ht 1.803 m (5' 11\")   Wt 88.5 kg (195 lb)   SpO2 99%   BMI 27.20 kg/m      General Impression: Very pleasant gentleman in no acute distress, well-oriented in time place and person.    Mental status.  Normal    HEENT: There is no clinical evidence of jaundice on examination of conjunctiva.  Extraocular eye movements are normal.  Mucous membranes are unremarkable.  Neck and thyroid glands are normal    Skin: Skin is otherwise normal to examination    Lymph Nodes: There is no cervical or inguinal lymphadenopathy    Respiratory System: There is a normal respiratory cycle on the chest is clear to both auscultation and percussion    Cardiovascular: The heart sounds are normal, no murmurs are heard, the pulse is regular, the peripheral pulses are palpable.  There is no evidence of any significant pitting peripheral edema    Abdominal: The abdomen is not obese, there are no palpable masses there are no areas of tenderness, guarding or rebound, there is no evidence of " "inguinal hernia    Extremities: The extremities are otherwise unremarkable    Back and Flank: There is no evidence of kyphosis, scoliosis or lordosis.  There is no tenderness over the spine or over the renal angles    Genital: The penis is circumcised with a normal size meatus.  Both testes are descended into the scrotum with the right testicle slightly smaller than the left but with no other remarkable features on the genitalia    Rectal: Good sphincter tone, normal perianal sensation.  Smooth rectal mucosa without hemorrhoids or fissures.  Smooth soft mildly enlarged prostate but without evidence of tenderness, bogginess or nodules.  Seminal vesicles.  Not palpable.  Perineum is otherwise normal to examination    Neurologic: There are no focal abnormal clinical neurological signs in the central, peripheral nervous systems    Impression: The patient has a significantly elevated PSA.  There is no family history of prostate cancer there are no previous PSA tests have not been done.  I had a careful discussion with him today about PSA, about his nonspecificity and in addition to that a careful discussion about prostate cancer explaining the relationship between the incidence of mortality of prostate cancer, how we treat prostate cancer and increasing significance of active surveillance now for management of low-grade prostate cancer.  I like to arrange for a T3 MRI of the prostate to give us more information about the prostate; based on the results of this we may need to consider MRI fusion biopsy if there are areas in the gland that are suspicious.  I did very carefully go over all of these issues in detail with the patient today including a lengthy discussion about prostate cancer.  I answered all his questions    Plan: T3 MRI prostate and see me after    \"This dictation was performed with voice recognition software and may contain errors,  omissions and inadvertent word substitution.\"    "

## 2019-12-23 ENCOUNTER — HOSPITAL ENCOUNTER (OUTPATIENT)
Dept: MRI IMAGING | Facility: CLINIC | Age: 64
Discharge: HOME | End: 2019-12-23
Attending: UROLOGY | Admitting: UROLOGY
Payer: COMMERCIAL

## 2019-12-23 DIAGNOSIS — R97.20 ELEVATED PROSTATE SPECIFIC ANTIGEN (PSA): ICD-10-CM

## 2019-12-23 PROCEDURE — 25500064 ZZH RX 255 OP 636: Performed by: UROLOGY

## 2019-12-23 PROCEDURE — A9585 GADOBUTROL INJECTION: HCPCS | Performed by: UROLOGY

## 2019-12-23 PROCEDURE — 40000141 ZZH STATISTIC PERIPHERAL IV START W/O US GUIDANCE

## 2019-12-23 PROCEDURE — 72197 MRI PELVIS W/O & W/DYE: CPT

## 2019-12-23 RX ORDER — GADOBUTROL 604.72 MG/ML
10 INJECTION INTRAVENOUS ONCE
Status: COMPLETED | OUTPATIENT
Start: 2019-12-23 | End: 2019-12-23

## 2019-12-23 RX ADMIN — GADOBUTROL 10 ML: 604.72 INJECTION INTRAVENOUS at 14:28

## 2020-01-13 ENCOUNTER — OFFICE VISIT (OUTPATIENT)
Dept: UROLOGY | Facility: CLINIC | Age: 65
End: 2020-01-13
Payer: COMMERCIAL

## 2020-01-13 VITALS
HEART RATE: 86 BPM | HEIGHT: 72 IN | BODY MASS INDEX: 25.73 KG/M2 | SYSTOLIC BLOOD PRESSURE: 158 MMHG | WEIGHT: 190 LBS | OXYGEN SATURATION: 98 % | DIASTOLIC BLOOD PRESSURE: 90 MMHG

## 2020-01-13 DIAGNOSIS — R97.20 ELEVATED PROSTATE SPECIFIC ANTIGEN (PSA): Primary | ICD-10-CM

## 2020-01-13 PROCEDURE — 99214 OFFICE O/P EST MOD 30 MIN: CPT | Performed by: UROLOGY

## 2020-01-13 ASSESSMENT — PAIN SCALES - GENERAL: PAINLEVEL: NO PAIN (0)

## 2020-01-13 ASSESSMENT — MIFFLIN-ST. JEOR: SCORE: 1694.83

## 2020-01-13 NOTE — NURSING NOTE
Chief Complaint   Patient presents with     Clinic Care Coordination - Follow-up     Pt here to review MRI     Estelle Mills CMA

## 2020-01-13 NOTE — PROGRESS NOTES
This very pleasant 64-year-old gentleman returns today to review the results of her recent MRI of the prostate.  I am seeing him because of an elevated PSA.  Results for EVI REYES (MRN [id number]) as of 11/27/2019 09:42    Ref. Range 10/14/2019 09:11   PSA Latest Ref Range: 0 - 4 ug/L 9.32 (H)   The only significant additional medical issue is treatment for hypertension  He is voiding well without evidence of gross hematuria.  There is no apparent family history of prostate cancer  My examination of the prostate in the office showed no evidence of any sinister findings within the prostate gland.  In view of the elevated PSA however we proceeded with an MRI of the prostate.  MRI PROSTATE: 12/23/2019 3:18 PM     CLINICAL HISTORY: Elevated prostate specific antigen (PSA)     Most Recent PSA: 9.32 ug/L     Comparison: None.     TECHNIQUE:  The following sequences were obtained: High-resolution axial  T2-weighted, coronal T2-weighted, 3D volumetric T2-weighted, axial  pre-contrast T1, axial diffusion-weighted, axial apparent diffusion  coefficient and axial dynamic contrast-enhanced T1. Postcontrast  images were evaluated on a separate workstation to evaluate dynamic  contrast enhancement. The technique of this exam is PI-RADS v2.1  compliant. Contrast dose: 10mL Gadavist     FINDINGS:  Size:  (2.4 x 4.6 x 4.1 cm, 23 grams  Hemorrhage: Absent  Peripheral zone: Heterogeneous on T2-weighted images. Suspicious  lesions as detailed below.  Transition zone: Nonenlarged. No suspicious lesions identified.     Lesion(s) in rank order of severity (highest score- to lowest score,  then by size)      Lesion 1:  Location: Left base and mid gland peripheral zone at the 5 o'clock  position relative to the urethra. Series 5 image 46.   Additional prostate regions involved: None  Size: 11.3 x 9.2 mm  T2 description: Lobulated, focus with slightly irregular margin with  T2 hypointensity.  T2 numerical assessment: 5  DWI description:  Markedly hyperintense diffusion restriction and  corresponding markedly hypointense on ADC.  DWI numerical assessment: 5  DCE assessment: Positive    Prostate margin: Capsular abutment 6-15 mm with focal bulging and  capsular irregularity   Lesion overall PI-RADS category: 5     Lesion 2:  Location: Right base and mid gland peripheral zone at the 7 o'clock  position relative to the urethra. Series 4 image 13.   Additional prostate regions involved: None  Size: 8.4 x 8.8 mm  T2 description: Nom-circumscribed, moderately hypointense T2 signal  T2 numerical assessment: 3  DWI description: Moderately hyperintense focus on DWI and moderate  hypointensity on ADC.  DWI numerical assessment: 3  DCE assessment: Positive    Prostate margin: Capsular abutment 6-15 mm with smooth contour  Lesion overall PI-RADS category:  4       Neurovascular bundles: The left lesion approaches the neurovascular  bundle, involvement is possible but not definite.   Seminal vesicles: No seminal vesicle involvement by malignancy.  Lymph nodes: No lymph node involvement  Bones: No suspicious lesions  Other pelvic organs: No additional findings.                                                                         IMPRESSION:  1. Based on the most suspicious abnormality, this exam is  characterized as PIRADS 5 - Clinically significant cancer is highly  likely to be present.  The most suspicious abnormality is located at  the left peripheral zone and there is high suspicion of extraprostatic  extension with possible involvement of the left neurovascular bundle.  2. No suspicious adenopathy or evidence of pelvic metastases.        PIRADS? v2.1 Assessment Categories   PIRADS 1: Very low (clinically significant cancer is highly unlikely  to be present)   PIRADS 2: Low (clinically significant cancer is unlikely to be  present)   PIRADS 3: Intermediate (the presence of clinically significant cancer  is equivocal)   PIRADS 4: High (clinically  significant cancer is likely to be present)     PIRADS 5: Very high (clinically significant cancer is highly likely to  be present)              I have personally reviewed the examination and initial interpretation  and I agree with the findings.     INDIA GAR MD    Impression.    The findings of the MRI are rather concerning.  He has a small gland of only 23 g but there are areas at the base of the prostate on each side which are of concern.  I therefore had a very careful discussion with him today about the situation  I went over the films with him in detail.  I went over my concerns about the presence of prostate cancer and explained that it was important we do a biopsy of the prostate in the near future.  It is possible course that this may not be cancerous but could see some other process.  There is no way of telling if there is cancer there what the grade of the cancer is.  I am going to recommend we do MRI fusion biopsy in the near future to determine the histologic nature of what we are seeing.  I then had a careful discussion with him about prostate cancer explaining the relationship between the incidence of mortality of prostate cancer, i.e. very large majority of patients diagnosed with prostate cancer and not dying of the disease but  with it.  We also had discussions about the treatment of prostate cancer going over the sort of strategies and options that would be considered which included active surveillance for many patients with low-grade disease, and then discussions about radical prostatectomy, radiation therapy, cryotherapy and even hormonal treatment.  Clearly extensive discussion about these options of premature until we know precisely what we are dealing with.  I did go over the situation with however very carefully with him in detail today.  I then answered all his questions.    Plan.  We will arrange for MRI fusion biopsy of the prostate.    Time.  We spent 25 minutes in careful  "discussion today about the potential implications and strategies that may be considered based on the MRI scan including discussions about MRI fusion biopsy and other potential strategies in case we do find prostate cancer    \"This dictation was performed with voice recognition software and may contain errors,  omissions and inadvertent word substitution.\"    "

## 2020-01-13 NOTE — LETTER
1/13/2020       RE: Dion Bowman  [address]  [facility] [address]     Dear Colleague,    Thank you for referring your patient, Dion Bowman, to the [facility]OLOG[facility] [place] at [facility]. Please see a copy of my visit note below.    This very pleasant 64-year-old gentleman returns today to review the results of her recent MRI of the prostate.  I am seeing him because of an elevated PSA.  Results for DION BOWMAN (MRN [id number]) as of 11/27/2019 09:42    Ref. Range 10/14/2019 09:11   PSA Latest Ref Range: 0 - 4 ug/L 9.32 (H)   The only significant additional medical issue is treatment for hypertension  He is voiding well without evidence of gross hematuria.  There is no apparent family history of prostate cancer  My examination of the prostate in the office showed no evidence of any sinister findings within the prostate gland.  In view of the elevated PSA however we proceeded with an MRI of the prostate.  MRI PROSTATE: 12/23/2019 3:18 PM     CLINICAL HISTORY: Elevated prostate specific antigen (PSA)     Most Recent PSA: 9.32 ug/L     Comparison: None.     TECHNIQUE:  The following sequences were obtained: High-resolution axial  T2-weighted, coronal T2-weighted, 3D volumetric T2-weighted, axial  pre-contrast T1, axial diffusion-weighted, axial apparent diffusion  coefficient and axial dynamic contrast-enhanced T1. Postcontrast  images were evaluated on a separate workstation to evaluate dynamic  contrast enhancement. The technique of this exam is PI-RADS v2.1  compliant. Contrast dose: 10mL Gadavist     FINDINGS:  Size:  (2.4 x 4.6 x 4.1 cm, 23 grams  Hemorrhage: Absent  Peripheral zone: Heterogeneous on T2-weighted images. Suspicious  lesions as detailed below.  Transition zone: Nonenlarged. No suspicious lesions identified.     Lesion(s) in rank order of severity (highest score- to lowest score,  then by size)      Lesion 1:  Location: Left  base and mid gland peripheral zone at the 5 o'clock  position relative to the urethra. Series 5 image 46.   Additional prostate regions involved: None  Size: 11.3 x 9.2 mm  T2 description: Lobulated, focus with slightly irregular margin with  T2 hypointensity.  T2 numerical assessment: 5  DWI description: Markedly hyperintense diffusion restriction and  corresponding markedly hypointense on ADC.  DWI numerical assessment: 5  DCE assessment: Positive    Prostate margin: Capsular abutment 6-15 mm with focal bulging and  capsular irregularity   Lesion overall PI-RADS category: 5     Lesion 2:  Location: Right base and mid gland peripheral zone at the 7 o'clock  position relative to the urethra. Series 4 image 13.   Additional prostate regions involved: None  Size: 8.4 x 8.8 mm  T2 description: Nom-circumscribed, moderately hypointense T2 signal  T2 numerical assessment: 3  DWI description: Moderately hyperintense focus on DWI and moderate  hypointensity on ADC.  DWI numerical assessment: 3  DCE assessment: Positive    Prostate margin: Capsular abutment 6-15 mm with smooth contour  Lesion overall PI-RADS category:  4       Neurovascular bundles: The left lesion approaches the neurovascular  bundle, involvement is possible but not definite.   Seminal vesicles: No seminal vesicle involvement by malignancy.  Lymph nodes: No lymph node involvement  Bones: No suspicious lesions  Other pelvic organs: No additional findings.                                                                         IMPRESSION:  1. Based on the most suspicious abnormality, this exam is  characterized as PIRADS 5 - Clinically significant cancer is highly  likely to be present.  The most suspicious abnormality is located at  the left peripheral zone and there is high suspicion of extraprostatic  extension with possible involvement of the left neurovascular bundle.  2. No suspicious adenopathy or evidence of pelvic metastases.        PIRADS? v2.1  Assessment Categories   PIRADS 1: Very low (clinically significant cancer is highly unlikely  to be present)   PIRADS 2: Low (clinically significant cancer is unlikely to be  present)   PIRADS 3: Intermediate (the presence of clinically significant cancer  is equivocal)   PIRADS 4: High (clinically significant cancer is likely to be present)     PIRADS 5: Very high (clinically significant cancer is highly likely to  be present)              I have personally reviewed the examination and initial interpretation  and I agree with the findings.     INDIA GAR MD    Impression.    The findings of the MRI are rather concerning.  He has a small gland of only 23 g but there are areas at the base of the prostate on each side which are of concern.  I therefore had a very careful discussion with him today about the situation  I went over the films with him in detail.  I went over my concerns about the presence of prostate cancer and explained that it was important we do a biopsy of the prostate in the near future.  It is possible course that this may not be cancerous but could see some other process.  There is no way of telling if there is cancer there what the grade of the cancer is.  I am going to recommend we do MRI fusion biopsy in the near future to determine the histologic nature of what we are seeing.  I then had a careful discussion with him about prostate cancer explaining the relationship between the incidence of mortality of prostate cancer, i.e. very large majority of patients diagnosed with prostate cancer and not dying of the disease but  with it.  We also had discussions about the treatment of prostate cancer going over the sort of strategies and options that would be considered which included active surveillance for many patients with low-grade disease, and then discussions about radical prostatectomy, radiation therapy, cryotherapy and even hormonal treatment.  Clearly extensive discussion about  "these options of premature until we know precisely what we are dealing with.  I did go over the situation with however very carefully with him in detail today.  I then answered all his questions.    Plan.  We will arrange for MRI fusion biopsy of the prostate.    Time.  We spent 25 minutes in careful discussion today about the potential implications and strategies that may be considered based on the MRI scan including discussions about MRI fusion biopsy and other potential strategies in case we do find prostate cancer    \"This dictation was performed with voice recognition software and may contain errors,  omissions and inadvertent word substitution.\"      Again, thank you for allowing me to participate in the care of your patient.      Sincerely,    Bakari Mckeon MD      "

## 2020-01-20 DIAGNOSIS — Z79.2 PROPHYLACTIC ANTIBIOTIC: Primary | ICD-10-CM

## 2020-01-20 RX ORDER — CIPROFLOXACIN 500 MG/1
500 TABLET, FILM COATED ORAL 2 TIMES DAILY
Qty: 6 TABLET | Refills: 0 | Status: SHIPPED | OUTPATIENT
Start: 2020-01-20 | End: 2020-04-28

## 2020-02-17 ENCOUNTER — OFFICE VISIT (OUTPATIENT)
Dept: UROLOGY | Facility: CLINIC | Age: 65
End: 2020-02-17
Payer: COMMERCIAL

## 2020-02-17 ENCOUNTER — ALLIED HEALTH/NURSE VISIT (OUTPATIENT)
Dept: UROLOGY | Facility: CLINIC | Age: 65
End: 2020-02-17
Payer: COMMERCIAL

## 2020-02-17 VITALS
SYSTOLIC BLOOD PRESSURE: 128 MMHG | BODY MASS INDEX: 25.73 KG/M2 | HEIGHT: 72 IN | WEIGHT: 190 LBS | HEART RATE: 94 BPM | DIASTOLIC BLOOD PRESSURE: 68 MMHG

## 2020-02-17 DIAGNOSIS — R97.20 ELEVATED PROSTATE SPECIFIC ANTIGEN (PSA): Primary | ICD-10-CM

## 2020-02-17 PROCEDURE — 55700 HC BIOPSY PROSTATE NEEDLE/PUNCH: CPT | Performed by: UROLOGY

## 2020-02-17 PROCEDURE — 76872 US TRANSRECTAL: CPT | Performed by: UROLOGY

## 2020-02-17 PROCEDURE — 96372 THER/PROPH/DIAG INJ SC/IM: CPT | Performed by: UROLOGY

## 2020-02-17 PROCEDURE — 88305 TISSUE EXAM BY PATHOLOGIST: CPT | Performed by: UROLOGY

## 2020-02-17 PROCEDURE — 88305 TISSUE EXAM BY PATHOLOGIST: CPT | Mod: 26,59 | Performed by: UROLOGY

## 2020-02-17 PROCEDURE — 99213 OFFICE O/P EST LOW 20 MIN: CPT | Mod: 25 | Performed by: UROLOGY

## 2020-02-17 RX ORDER — GENTAMICIN 40 MG/ML
80 INJECTION, SOLUTION INTRAMUSCULAR; INTRAVENOUS ONCE
Status: COMPLETED | OUTPATIENT
Start: 2020-02-17 | End: 2020-02-17

## 2020-02-17 RX ORDER — LIDOCAINE HYDROCHLORIDE 20 MG/ML
400 INJECTION, SOLUTION INFILTRATION; PERINEURAL ONCE
Status: COMPLETED | OUTPATIENT
Start: 2020-02-17 | End: 2020-02-17

## 2020-02-17 RX ADMIN — GENTAMICIN 80 MG: 40 INJECTION, SOLUTION INTRAMUSCULAR; INTRAVENOUS at 12:40

## 2020-02-17 RX ADMIN — LIDOCAINE HYDROCHLORIDE 400 MG: 20 INJECTION, SOLUTION INFILTRATION; PERINEURAL at 13:05

## 2020-02-17 ASSESSMENT — PAIN SCALES - GENERAL
PAINLEVEL: NO PAIN (0)
PAINLEVEL: NO PAIN (0)

## 2020-02-17 ASSESSMENT — MIFFLIN-ST. JEOR: SCORE: 1694.83

## 2020-02-17 NOTE — PATIENT INSTRUCTIONS
[facility]  Transrectal Ultrasound  Post Operative Information    The physician who performed your Transrectal Ultrasound is Dr. Mckeon (telephone number [phone]).  Please contact this doctor if you have any problems or questions.  If unable to reach your doctor, please return to the Emergency Department.      Take one antibiotic the evening of the procedure and then as directed on your prescription.    Drink at least 6-8 glasses of fluids for the first 48 hours.    Avoid heavy lifting and strenuous activity for 48 hours.    Avoid sexual intercourse for the first 24 hours.    No aspirin or ibuprofen products (Motrin, Advil, Nuprin, ect.) for one week.  You may take acetaminophen (Tylenol) for pain.    You may notice a small amount of blood on the tissue after a bowel movement.    You may pass blood with clots in your urine following the procedure.  The amount will decrease with time but may be visible for up to two weeks.     You make have blood in your semen for 4 weeks after the procedure.    You may experience mild perineal (groin area) discomfort after the procedure.    Please call you doctor if you have any of the follow symptoms:  Fever  Increase in the amount of blood passed  Severe discomfort or pain

## 2020-02-17 NOTE — PROGRESS NOTES
The following medication was given:     MEDICATION: Gentamicin  ROUTE: IM  SITE: [facility]  DOSE: 80mg/2ml  LOT #: [id number]  : [employer]  EXPIRATION DATE: 09/20  NDC#: 74433-970-76  Was there drug waste? No  Multi-dose vial: Yes    Trinidad Phillips LPN  February 17, 2020

## 2020-02-17 NOTE — NURSING NOTE
Chief Complaint   Patient presents with     Prostate Biopsy     Elevated PSA      Prior to the start of the procedure and with procedural staff participation, I verbally confirmed the patient s identity using two indicators, relevant allergies, that the procedure was appropriate and matched the consent or emergent situation, and that the correct equipment/implants were available. Immediately prior to starting the procedure I conducted the Time Out with the procedural staff and re-confirmed the patient s name, procedure, and site/side. (The Joint Commission universal protocol was followed.)  Yes    Sedation (Moderate or Deep): None  Consent read and signed: Yes     Allergies   Allergen Reactions     Morphine Sulfate Nausea and Vomiting and Cramps     Pre-operative antibiotics taken: Yes  Aspirin or other blood thinning medications not taken in 7-10 days:  Yes  Time of Fleet's enema: 11:25am  Trinidad Phillips LPN

## 2020-02-17 NOTE — NURSING NOTE
The following medication was given:     MEDICATION: 2% Lidocaine   ROUTE: Local Infiltration  SITE: Prostate  DOSE: 400mg/20ml  LOT #: [id number]  : [employer]  EXPIRATION DATE: 10/23  NDC#: 62442-025-84  Was there drug waste? No  Multi-dose vial: Yes    Trinidad Phillips LPN  February 17, 2020

## 2020-02-17 NOTE — LETTER
2/17/2020       RE: Dion Bowman  [address]  [facility] [address]     Dear Colleague,    Thank you for referring your patient, Dion Bowman, to the [facility] UROLOGY CLIN[facility] [place] at [facility]. Please see a copy of my visit note below.    This very pleasant 64-year-old gentleman returns today for MRI fusion biopsy   I am seeing him because of an elevated PSA.  Results for DION BOWMAN (MRN [id number]) as of 11/27/2019 09:42    Ref. Range 10/14/2019 09:11   PSA Latest Ref Range: 0 - 4 ug/L 9.32 (H)   The only significant additional medical issue is treatment for hypertension  He is voiding well without evidence of gross hematuria.  There is no apparent family history of prostate cancer  My examination of the prostate in the office showed no evidence of any sinister findings within the prostate gland.  In view of the elevated PSA however we proceeded with an MRI of the prostate.  MRI was as follows.  IMPRESSION:  1. Based on the most suspicious abnormality, this exam is  characterized as PIRADS 5 - Clinically significant cancer is highly  likely to be present.  The most suspicious abnormality is located at  the left peripheral zone and there is high suspicion of extraprostatic  extension with possible involvement of the left neurovascular bundle.  2. No suspicious adenopathy or evidence of pelvic metastases.    Today I could palpate the nodule at the left apex.    Procedure.  MRI fusion biopsy of prostate.  Surgeon.  Mike  Anesthesia periprosthetic block.  Description.  With the patient in the left lateral position, digital rectal examination was performed.  Today I could feel a nodule at the left base.  The ultrasound probe was introduced and routine ultrasonography and mapping was performed.  I did administer the periprosthetic block using 20 cc of 1% lidocaine through the probe and into the postprostatic space.  I then using MRI fusion took 3  "biopsies from the nodule of the left base and then to carefully directed biopsies of the nodule on the right base.  I then performed sextant biopsies on both sides.  Digital pressure was applied through the rectum for 2 minutes.  Patient tolerated the procedure well.    Impression.  I had a careful discussion with the patient about the expectation of hematuria, rectal bleeding and hematospermia for up to 48 hours.  In particular I emphasized the importance of coming straight to the emergency room if he develops chills or fevers.  We then had a discussion about the potential findings and had a discussion with him about prostate cancer, and we had a discussion to about some of the options for the treatment of prostate cancer should we find it.  I did go over the entire situation as outlined above in detail with the patient.  I answered all questions.    Plan.  I will see him in the near future to discuss the findings of the biopsy.    Time.  15 minutes spent in addition to procedure to discuss the findings, to go over the records previous MRI studies and other potential lab studies, and as noted above to talk about some of the expected possible findings of the biopsies and the consequences    \"This dictation was performed with voice recognition software and may contain errors,  omissions and inadvertent word substitution.\"          PIRADS? v2.1 Assessment Categories       Again, thank you for allowing me to participate in the care of your patient.      Sincerely,    Bakari Mckeon MD      "

## 2020-02-17 NOTE — PATIENT INSTRUCTIONS
[facility]  Transrectal Ultrasound  Post Operative Information    The physician who performed your Transrectal Ultrasound is Dr. Mckeon (telephone number [phone]).  Please contact this doctor if you have any problems or questions.  If unable to reach your doctor, please return to the Emergency Department.      Take one antibiotic the evening of the procedure and then as directed on your prescription.    Drink at least 6-8 glasses of fluids for the first 48 hours.    Avoid heavy lifting and strenuous activity for 48 hours.    Avoid sexual intercourse for the first 24 hours.    No aspirin or ibuprofen products (Motrin, Advil, Nuprin, ect.) for one week.  You may take acetaminophen (Tylenol) for pain.    You may notice a small amount of blood on the tissue after a bowel movement.    You may pass blood with clots in your urine following the procedure.  The amount will decrease with time but may be visible for up to two weeks.     You make have blood in your semen for 4 weeks after the procedure.    You may experience mild perineal (groin area) discomfort after the procedure.    Please call you doctor if you have any of the follow symptoms:  Fever  Increase in the amount of blood passed  Severe discomfort or pain

## 2020-02-17 NOTE — PROGRESS NOTES
This very pleasant 64-year-old gentleman returns today for MRI fusion biopsy   I am seeing him because of an elevated PSA.  Results for EVI REYES (MRN [id number]) as of 11/27/2019 09:42    Ref. Range 10/14/2019 09:11   PSA Latest Ref Range: 0 - 4 ug/L 9.32 (H)   The only significant additional medical issue is treatment for hypertension  He is voiding well without evidence of gross hematuria.  There is no apparent family history of prostate cancer  My examination of the prostate in the office showed no evidence of any sinister findings within the prostate gland.  In view of the elevated PSA however we proceeded with an MRI of the prostate.  MRI was as follows.  IMPRESSION:  1. Based on the most suspicious abnormality, this exam is  characterized as PIRADS 5 - Clinically significant cancer is highly  likely to be present.  The most suspicious abnormality is located at  the left peripheral zone and there is high suspicion of extraprostatic  extension with possible involvement of the left neurovascular bundle.  2. No suspicious adenopathy or evidence of pelvic metastases.    Today I could palpate the nodule at the left apex.    Procedure.  MRI fusion biopsy of prostate.  Surgeon.  Mike  Anesthesia periprosthetic block.  Description.  With the patient in the left lateral position, digital rectal examination was performed.  Today I could feel a nodule at the left base.  The ultrasound probe was introduced and routine ultrasonography and mapping was performed.  I did administer the periprosthetic block using 20 cc of 1% lidocaine through the probe and into the postprostatic space.  I then using MRI fusion took 3 biopsies from the nodule of the left base and then to carefully directed biopsies of the nodule on the right base.  I then performed sextant biopsies on both sides.  Digital pressure was applied through the rectum for 2 minutes.  Patient tolerated the procedure well.    Impression.  I had a careful  "discussion with the patient about the expectation of hematuria, rectal bleeding and hematospermia for up to 48 hours.  In particular I emphasized the importance of coming straight to the emergency room if he develops chills or fevers.  We then had a discussion about the potential findings and had a discussion with him about prostate cancer, and we had a discussion to about some of the options for the treatment of prostate cancer should we find it.  I did go over the entire situation as outlined above in detail with the patient.  I answered all questions.    Plan.  I will see him in the near future to discuss the findings of the biopsy.    Time.  15 minutes spent in addition to procedure to discuss the findings, to go over the records previous MRI studies and other potential lab studies, and as noted above to talk about some of the expected possible findings of the biopsies and the consequences    \"This dictation was performed with voice recognition software and may contain errors,  omissions and inadvertent word substitution.\"          PIRADS? v2.1 Assessment Categories     "

## 2020-02-19 LAB — COPATH REPORT: NORMAL

## 2020-02-20 DIAGNOSIS — C61 PROSTATE CANCER (H): Primary | ICD-10-CM

## 2020-02-26 ENCOUNTER — HOSPITAL ENCOUNTER (OUTPATIENT)
Dept: NUCLEAR MEDICINE | Facility: CLINIC | Age: 65
Setting detail: NUCLEAR MEDICINE
Discharge: HOME | End: 2020-02-26
Attending: UROLOGY | Admitting: UROLOGY
Payer: COMMERCIAL

## 2020-02-26 ENCOUNTER — HOSPITAL ENCOUNTER (OUTPATIENT)
Dept: CT IMAGING | Facility: CLINIC | Age: 65
Discharge: HOME | End: 2020-02-26
Attending: UROLOGY | Admitting: UROLOGY
Payer: COMMERCIAL

## 2020-02-26 ENCOUNTER — HOSPITAL ENCOUNTER (OUTPATIENT)
Dept: NUCLEAR MEDICINE | Facility: CLINIC | Age: 65
Setting detail: NUCLEAR MEDICINE
End: 2020-02-26
Attending: UROLOGY
Payer: COMMERCIAL

## 2020-02-26 DIAGNOSIS — C61 PROSTATE CANCER (H): ICD-10-CM

## 2020-02-26 PROCEDURE — A9503 TC99M MEDRONATE: HCPCS | Performed by: UROLOGY

## 2020-02-26 PROCEDURE — 74177 CT ABD & PELVIS W/CONTRAST: CPT

## 2020-02-26 PROCEDURE — 25000128 H RX IP 250 OP 636: Performed by: UROLOGY

## 2020-02-26 PROCEDURE — 34300033 ZZH RX 343: Performed by: UROLOGY

## 2020-02-26 PROCEDURE — 78306 BONE IMAGING WHOLE BODY: CPT

## 2020-02-26 PROCEDURE — 25000125 ZZHC RX 250: Performed by: UROLOGY

## 2020-02-26 RX ORDER — TC 99M MEDRONATE 20 MG/10ML
25 INJECTION, POWDER, LYOPHILIZED, FOR SOLUTION INTRAVENOUS ONCE
Status: COMPLETED | OUTPATIENT
Start: 2020-02-26 | End: 2020-02-26

## 2020-02-26 RX ORDER — IOPAMIDOL 755 MG/ML
94 INJECTION, SOLUTION INTRAVASCULAR ONCE
Status: COMPLETED | OUTPATIENT
Start: 2020-02-26 | End: 2020-02-26

## 2020-02-26 RX ADMIN — IOPAMIDOL 94 ML: 755 INJECTION, SOLUTION INTRAVENOUS at 08:22

## 2020-02-26 RX ADMIN — SODIUM CHLORIDE 71 ML: 9 INJECTION, SOLUTION INTRAVENOUS at 08:23

## 2020-02-26 RX ADMIN — TC 99M MEDRONATE 26 MCI.: 20 INJECTION, POWDER, LYOPHILIZED, FOR SOLUTION INTRAVENOUS at 08:03

## 2020-02-27 ENCOUNTER — OFFICE VISIT (OUTPATIENT)
Dept: UROLOGY | Facility: CLINIC | Age: 65
End: 2020-02-27
Payer: COMMERCIAL

## 2020-02-27 VITALS
HEIGHT: 72 IN | HEART RATE: 74 BPM | BODY MASS INDEX: 25.73 KG/M2 | OXYGEN SATURATION: 94 % | DIASTOLIC BLOOD PRESSURE: 88 MMHG | WEIGHT: 190 LBS | SYSTOLIC BLOOD PRESSURE: 146 MMHG

## 2020-02-27 DIAGNOSIS — C61 PROSTATE CANCER (H): Primary | ICD-10-CM

## 2020-02-27 PROCEDURE — 99215 OFFICE O/P EST HI 40 MIN: CPT | Performed by: UROLOGY

## 2020-02-27 ASSESSMENT — MIFFLIN-ST. JEOR: SCORE: 1694.83

## 2020-02-27 ASSESSMENT — PAIN SCALES - GENERAL: PAINLEVEL: NO PAIN (0)

## 2020-02-27 NOTE — PROGRESS NOTES
This very pleasant 64-year-old gentleman returns today for review of his pathology report.  I am seeing him because of an elevated PSA.  Results for EVI REYES (MRN [id number]) as of 11/27/2019 09:42    Ref. Range 10/14/2019 09:11   PSA Latest Ref Range: 0 - 4 ug/L 9.32 (H)   The only significant additional medical issue is treatment for hypertension  He is voiding well without evidence of gross hematuria.  There is no apparent family history of prostate cancer  My examination of the prostate in the office showed no evidence of any sinister findings within the prostate gland.  In view of the elevated PSA however we proceeded with an MRI of the prostate.  MRI was as follows.  IMPRESSION:  1. Based on the most suspicious abnormality, this exam is  characterized as PIRADS 5 - Clinically significant cancer is highly  likely to be present.  The most suspicious abnormality is located at  the left peripheral zone and there is high suspicion of extraprostatic  extension with possible involvement of the left neurovascular bundle.  2. 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 in one of   one needle core biopsy and 40% of   submitted tissue     E: Prostate, left mid, biopsy   - Mixed ductal and acinar adenocarcinoma, 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 in one of   one needle core biopsy and 80% of   submitted tissue     F: Prostate, left apex, biopsy   - Adenocarcinoma, acinar type, [place]'s grade 3/3 with high-grade   prostatic  intraepithelial neoplasia, tumor   involves one of one needle core biopsy and 30% of submitted tissue     G: Prostate, right lateral base, biopsy   - Adenocarcinoma, acinar type, [place]'s grade 3/3 in one of one needle   core biopsy and 20% of submitted   tissue     H: Prostate, right lateral mid, biopsy   -Adenocarcinoma, acinar type, [place]'s grade 3/4 in one of one needle   core biopsy and 30% of submitted   tissue, high-grade prostatic intraepithelial neoplasia     I: Prostate, right lateral apex, biopsy   - Adenocarcinoma, [place]'s grade 3/4 with focal ductal component,   involving one of one needle core biopsy and   20% of submitted tissue.     J: Prostate, right base, biopsy   - Adenocarcinoma, acinar type, [place]'s grade 3/3 involving one of one   needle core biopsy and 80% of   submitted tissue     K: Prostate, right mid, biopsy   - Adenocarcinoma, [place]'s grade 3/3 involving one of one needle core   biopsy and 10% of submitted tissue,   high-grade prostatic intraepithelial neoplasia     L: Prostate, right apex, biopsy   - Benign prostate tissue     M: Prostate, right, lesion, biopsy    Adenocarcinoma, acinar type, acinar type, Deb's grade 3/3 in one of 2    needle core biopsies and 40% of   submitted tissue     N: Prostate, left base, biopsy   - Adenocarcinoma, mixed ductal and acinar type, [place]'s grade 4/3 in 2   of 3 needle core biopsies and 70% of   submitted tissue     COMMENT:   A minor component of ductal carcinoma cannot be excluded in those lesions   designated high-grade   intraepithelial neoplasia     Electronically signed out by:     Bert Torres M.D.     We then completed metastatic evaluation with a CT scan of the abdomen and pelvis and a bone scan.    CT ABDOMEN AND PELVIS WITH CONTRAST 2/26/2020 8:38 AM     CLINICAL HISTORY: Prostate cancer staging. Prostate cancer (H).     TECHNIQUE: CT scan of the abdomen and pelvis was performed following  injection of IV contrast.  Multiplanar reformats were obtained. Dose  reduction techniques were used.     CONTRAST: 94 mL Isovue-370     COMPARISON: None.     FINDINGS:   LOWER CHEST: Normal.     HEPATOBILIARY: Cholelithiasis. No liver lesions.     PANCREAS: Normal.     SPLEEN: Normal.     ADRENAL GLANDS: Normal.     KIDNEYS/BLADDER: Bilateral renal cysts for which no follow-up is  needed.     BOWEL: Normal.     PELVIC ORGANS: Known prostate cancer not visualized by CT.     ADDITIONAL FINDINGS: No pelvic or retroperitoneal lymphadenopathy.     MUSCULOSKELETAL: Normal.                                                                      IMPRESSION: No evidence of metastatic disease in the abdomen or  pelvis.     JEAN VICKERS MD    NUCLEAR MEDICINE WHOLE BODY BONE SCAN February 26, 2020 11:14 AM     HISTORY: Abnormal x-ray, femur. Prostate cancer staging.     COMPARISON:       Prior bone scan: None.       Other relevant study: Abdomen/pelvis CT from today.     TECHNIQUE: Following the uneventful intravenous administration of  26mCi Tc99m MDP @ 0800, L AC IV.  Routine delayed imaging was  performed of the entire body.     IMAGES OBTAINED: Planar whole body anterior/posterior images and  planar head/neck oblique images.     FINDINGS: Degenerative uptake in the knees. Mild degenerative uptake  in the feet and shoulders. No scintigraphic evidence of skeletal  metastatic disease.                                                                       IMPRESSION: No scintigraphic evidence of skeletal metastatic disease.  No abnormal uptake in the femur.      JOSE SILVA MD         Impression and discussion.  I went over the entire situation very carefully with the patient in detail today.  I reviewed the pathology report with him and went over each of the biopsies in detail.  In summary the left side of the gland, which was the side where there was clinically more suspicion on rectal examination shows higher grade disease with a high volume of  Deb 8 and Deb 7 disease.  On the right side we only find [place] 6 disease and significant volume also.  There is no evidence however on the CT scan and the bone scan of any metastatic involvement.  I went over all of the issues here very carefully explaining the pathology report, the meaning of the [place] score the meaning of the volume of the disease and the implications of the CT scan and the bone scan in detail.  I then had a very careful discussion with him also about the options for management.  This included the following  1.  Active surveillance.  2.  Radical prostatectomy.  3.  Radiation therapy.  4.  Cryotherapy.  5.  Hormonal treatment.    In my opinion I would not consider cryotherapy in this situation although I did explain the technique to him.  Neither would I consider active surveillance in the face of high-grade disease at only 64 who is in good health.  I do not see an indication for hormonal treatment in the absence of obvious metastatic disease.  The only exception to that would be if short-term hormonal treatment was going to be used in combination with localized therapy.  I had a lengthy discussion with him about radical prostatectomy, the alternative techniques, the potential risks including incontinence and impotence and positive surgical margins, long-term management and other potential complications associated with this.  We also had a careful and lengthy discussion about radiation therapy, the techniques involved, the potential side effects in the short-term of dysuria slowing of the stream and hematuria and the longer-term complications over many years of slight but statistically significant increase in the incidence of both bladder cancer and rectal cancer, gross hematuria, shrinkage of the bladder etc.  After a lengthy discussion of all of the issues with many questions answered it is my recommendation that the most appropriate treatment is likely to be radical prostatectomy  even though there is evidence of high-grade disease.  I have advised him that could be positive surgical margins and he may need adjuvant radiotherapy afterwards.  However I am also going to recommend he talk to our radiation therapist about radiation therapy before any final decision on treatment is taken as current IMRT seems to have better tolerance and results we hope bandpass techniques.  I am therefore going to retrieve him to meet with Dr. Nitish Flannery, to discuss his opinion regarding treatment; he is a expert in 8 robotic radical prostatectomy with a great deal of experience and skill in this area.  I will also ask you to meet with Dr. Ariel Mercado, our radiation therapist for his opinion on radiation therapy.  After that I be happy to discuss the situation further with the patient if he wishes although by then he may have already decided on therapy.  I went over the entire situation with the patient in detail today.  I answered all questions    Plan.  To see Dr. Flannery and Dr. Mercado  Decisions can be made after all this is been completed.    Time.  45 minutes.  Greater than 50% spent in discussion and consultation

## 2020-02-27 NOTE — NURSING NOTE
Chief Complaint   Patient presents with     Clinic Care Coordination - Follow-up     biopsy results   Ingrid Tran LPN

## 2020-02-27 NOTE — LETTER
2/27/2020       RE: Dion Bowman  [address]  [facility] [address]     Dear Colleague,    Thank you for referring your patient, Dion Bowman, to the [facility] [place] at [facility]. Please see a copy of my visit note below.    This very pleasant 64-year-old gentleman returns today for review of his pathology report.  I am seeing him because of an elevated PSA.  Results for DION BOWMAN (MRN [id number]) as of 11/27/2019 09:42    Ref. Range 10/14/2019 09:11   PSA Latest Ref Range: 0 - 4 ug/L 9.32 (H)   The only significant additional medical issue is treatment for hypertension  He is voiding well without evidence of gross hematuria.  There is no apparent family history of prostate cancer  My examination of the prostate in the office showed no evidence of any sinister findings within the prostate gland.  In view of the elevated PSA however we proceeded with an MRI of the prostate.  MRI was as follows.  IMPRESSION:  1. Based on the most suspicious abnormality, this exam is  characterized as PIRADS 5 - Clinically significant cancer is highly  likely to be present.  The most suspicious abnormality is located at  the left peripheral zone and there is high suspicion of extraprostatic  extension with possible involvement of the left neurovascular bundle.  2. 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 involving one of one needle   core and 90% of submitted tissue     C: Prostate, left lateral apex, biopsy   - Ductal adenocarcinoma, 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 in one of one needle core   biopsy and 15% of submitted tissue     D: Prostate, left base, biopsy   - Mixed ductal and acinar adenocarcinoma, [place]'s grade 4/3 in one of   one needle core biopsy and 40% of    submitted tissue     E: Prostate, left mid, biopsy   - Mixed ductal and acinar adenocarcinoma, 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 in one of   one needle core biopsy and 80% of   submitted tissue     F: Prostate, left apex, biopsy   - Adenocarcinoma, acinar type, [place]'s grade 3/3 with high-grade   prostatic intraepithelial neoplasia, tumor   involves one of one needle core biopsy and 30% of submitted tissue     G: Prostate, right lateral base, biopsy   - Adenocarcinoma, acinar type, [place]'s grade 3/3 in one of one needle   core biopsy and 20% of submitted   tissue     H: Prostate, right lateral mid, biopsy   -Adenocarcinoma, acinar type, [place]'s grade 3/4 in one of one needle   core biopsy and 30% of submitted   tissue, high-grade prostatic intraepithelial neoplasia     I: Prostate, right lateral apex, biopsy   - Adenocarcinoma, [place]'s grade 3/4 with focal ductal component,   involving one of one needle core biopsy and   20% of submitted tissue.     J: Prostate, right base, biopsy   - Adenocarcinoma, acinar type, Deb's grade 3/3 involving one of one   needle core biopsy and 80% of   submitted tissue     K: Prostate, right mid, biopsy   - Adenocarcinoma, [place]'s grade 3/3 involving one of one needle core   biopsy and 10% of submitted tissue,   high-grade prostatic intraepithelial neoplasia     L: Prostate, right apex, biopsy   - Benign prostate tissue     M: Prostate, right, lesion, biopsy    Adenocarcinoma, acinar type, acinar type, [place]'s grade 3/3 in one of 2    needle core biopsies and 40% of   submitted tissue     N: Prostate, left base, biopsy   - Adenocarcinoma, mixed ductal and acinar type, [place]'s grade 4/3 in 2   of 3 needle core biopsies and 70% of   submitted tissue     COMMENT:   A minor component of ductal carcinoma cannot be excluded in those lesions   designated high-grade   intraepithelial neoplasia     Electronically signed out by:     Bert Torres M.D.     We then completed metastatic  evaluation with a CT scan of the abdomen and pelvis and a bone scan.    CT ABDOMEN AND PELVIS WITH CONTRAST 2/26/2020 8:38 AM     CLINICAL HISTORY: Prostate cancer staging. Prostate cancer (H).     TECHNIQUE: CT scan of the abdomen and pelvis was performed following  injection of IV contrast. Multiplanar reformats were obtained. Dose  reduction techniques were used.     CONTRAST: 94 mL Isovue-370     COMPARISON: None.     FINDINGS:   LOWER CHEST: Normal.     HEPATOBILIARY: Cholelithiasis. No liver lesions.     PANCREAS: Normal.     SPLEEN: Normal.     ADRENAL GLANDS: Normal.     KIDNEYS/BLADDER: Bilateral renal cysts for which no follow-up is  needed.     BOWEL: Normal.     PELVIC ORGANS: Known prostate cancer not visualized by CT.     ADDITIONAL FINDINGS: No pelvic or retroperitoneal lymphadenopathy.     MUSCULOSKELETAL: Normal.                                                                      IMPRESSION: No evidence of metastatic disease in the abdomen or  pelvis.     JEAN VICKERS MD    NUCLEAR MEDICINE WHOLE BODY BONE SCAN February 26, 2020 11:14 AM     HISTORY: Abnormal x-ray, femur. Prostate cancer staging.     COMPARISON:       Prior bone scan: None.       Other relevant study: Abdomen/pelvis CT from today.     TECHNIQUE: Following the uneventful intravenous administration of  26mCi Tc99m MDP @ 0800, L AC IV.  Routine delayed imaging was  performed of the entire body.     IMAGES OBTAINED: Planar whole body anterior/posterior images and  planar head/neck oblique images.     FINDINGS: Degenerative uptake in the knees. Mild degenerative uptake  in the feet and shoulders. No scintigraphic evidence of skeletal  metastatic disease.                                                                       IMPRESSION: No scintigraphic evidence of skeletal metastatic disease.  No abnormal uptake in the femur.      JOSE SILVA MD         Impression and discussion.  I went over the entire situation very carefully  with the patient in detail today.  I reviewed the pathology report with him and went over each of the biopsies in detail.  In summary the left side of the gland, which was the side where there was clinically more suspicion on rectal examination shows higher grade disease with a high volume of [place] 8 and [place] 7 disease.  On the right side we only find [place] 6 disease and significant volume also.  There is no evidence however on the CT scan and the bone scan of any metastatic involvement.  I went over all of the issues here very carefully explaining the pathology report, the meaning of the Deb score the meaning of the volume of the disease and the implications of the CT scan and the bone scan in detail.  I then had a very careful discussion with him also about the options for management.  This included the following  1.  Active surveillance.  2.  Radical prostatectomy.  3.  Radiation therapy.  4.  Cryotherapy.  5.  Hormonal treatment.    In my opinion I would not consider cryotherapy in this situation although I did explain the technique to him.  Neither would I consider active surveillance in the face of high-grade disease at only 64 who is in good health.  I do not see an indication for hormonal treatment in the absence of obvious metastatic disease.  The only exception to that would be if short-term hormonal treatment was going to be used in combination with localized therapy.  I had a lengthy discussion with him about radical prostatectomy, the alternative techniques, the potential risks including incontinence and impotence and positive surgical margins, long-term management and other potential complications associated with this.  We also had a careful and lengthy discussion about radiation therapy, the techniques involved, the potential side effects in the short-term of dysuria slowing of the stream and hematuria and the longer-term complications over many years of slight but statistically significant  increase in the incidence of both bladder cancer and rectal cancer, gross hematuria, shrinkage of the bladder etc.  After a lengthy discussion of all of the issues with many questions answered it is my recommendation that the most appropriate treatment is likely to be radical prostatectomy even though there is evidence of high-grade disease.  I have advised him that could be positive surgical margins and he may need adjuvant radiotherapy afterwards.  However I am also going to recommend he talk to our radiation therapist about radiation therapy before any final decision on treatment is taken as current IMRT seems to have better tolerance and results we hope bandpass techniques.  I am therefore going to retrieve him to meet with Dr. Nitish Flannery, to discuss his opinion regarding treatment; he is a expert in 8 robotic radical prostatectomy with a great deal of experience and skill in this area.  I will also ask you to meet with Dr. Ariel Mercado, our radiation therapist for his opinion on radiation therapy.  After that I be happy to discuss the situation further with the patient if he wishes although by then he may have already decided on therapy.  I went over the entire situation with the patient in detail today.  I answered all questions    Plan.  To see Dr. Flannery and Dr. Mercado  Decisions can be made after all this is been completed.    Time.  45 minutes.  Greater than 50% spent in discussion and consultation      Again, thank you for allowing me to participate in the care of your patient.      Sincerely,    Bakari Mckeon MD

## 2020-03-20 ENCOUNTER — OFFICE VISIT (OUTPATIENT)
Dept: UROLOGY | Facility: CLINIC | Age: 65
End: 2020-03-20
Payer: COMMERCIAL

## 2020-03-20 VITALS
SYSTOLIC BLOOD PRESSURE: 146 MMHG | BODY MASS INDEX: 25.73 KG/M2 | HEIGHT: 72 IN | OXYGEN SATURATION: 98 % | DIASTOLIC BLOOD PRESSURE: 82 MMHG | HEART RATE: 86 BPM | WEIGHT: 190 LBS

## 2020-03-20 DIAGNOSIS — C61 PROSTATE CANCER (H): Primary | ICD-10-CM

## 2020-03-20 PROCEDURE — 99215 OFFICE O/P EST HI 40 MIN: CPT | Performed by: UROLOGY

## 2020-03-20 RX ORDER — HEPARIN SODIUM 10000 [USP'U]/ML
5000 INJECTION, SOLUTION INTRAVENOUS; SUBCUTANEOUS
Status: CANCELLED | OUTPATIENT
Start: 2020-03-20

## 2020-03-20 RX ORDER — CEFAZOLIN SODIUM 1 G
1 VIAL (EA) INJECTION SEE ADMIN INSTRUCTIONS
Status: CANCELLED | OUTPATIENT
Start: 2020-03-20

## 2020-03-20 ASSESSMENT — MIFFLIN-ST. JEOR: SCORE: 1694.83

## 2020-03-20 ASSESSMENT — PAIN SCALES - GENERAL: PAINLEVEL: NO PAIN (0)

## 2020-03-20 NOTE — PROGRESS NOTES
Chief Complaint:   Prostate Cancer         History of Present Illness:    Dion Bowman is a very pleasant 64 year old male who presents with a history of Prostate Cancer. TRUS biopsy done by Dr. Mckeon in the setting of elevated PSA of 9.32. Notes mild urinary symptoms. No hematuria or dysuria.  Normal erections. - not a high priority.     No family history of prostate cancer.    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 in one of   one needle core biopsy and 80% of   submitted tissue     F: Prostate, left apex, biopsy   - Adenocarcinoma, acinar type, [place]'s grade 3/3 with high-grade   prostatic intraepithelial neoplasia, tumor   involves one of one needle core biopsy and 30% of submitted tissue     G: Prostate, right lateral base, biopsy   - Adenocarcinoma, acinar type, [place]'s grade 3/3 in one of one needle   core biopsy and 20% of submitted   tissue     H: Prostate, right lateral mid, biopsy   -Adenocarcinoma, acinar type, [place]'s grade 3/4 in one of one needle   core biopsy and 30% of submitted   tissue, high-grade prostatic intraepithelial neoplasia     I: Prostate, right lateral apex, biopsy   - Adenocarcinoma, [place]'s grade 3/4 with focal ductal component,   involving one of one needle core biopsy and   20% of submitted tissue.     J: Prostate, right base, biopsy   - Adenocarcinoma, acinar type, [place]'s grade 3/3 involving one of one   needle core biopsy and 80% of    submitted tissue     K: Prostate, right mid, biopsy   - Adenocarcinoma, [place]'s grade 3/3 involving one of one needle core   biopsy and 10% of submitted tissue,   high-grade prostatic intraepithelial neoplasia     L: Prostate, right apex, biopsy   - Benign prostate tissue     M: Prostate, right, lesion, biopsy    Adenocarcinoma, acinar type, acinar type, [place]'s grade 3/3 in one of 2    needle core biopsies and 40% of   submitted tissue     N: Prostate, left base, biopsy   - Adenocarcinoma, mixed ductal and acinar type, Deb's grade 4/3 in 2   of 3 needle core biopsies and 70% of   submitted tissue     COMMENT:   A minor component of ductal carcinoma cannot be excluded in those lesions   designated high-grade   intraepithelial neoplasia            Past Medical History:     Past Medical History:   Diagnosis Date     CARDIOVASCULAR SCREENING; LDL GOAL LESS THAN 130 5/9/2010     Hypertension goal BP (blood pressure) < 140/90 12/16/2010     Impaired fasting blood sugar 12/21/2010          Past Surgical History:     Past Surgical History:   Procedure Laterality Date     HC COLONOSCOPY THRU STOMA, DIAGNOSTIC  3/09/2009    Negative - Due in 2019     HERNIA REPAIR, INGUINAL RT/LT  1/26/2006    Left Inguinal Hernia          Medications     Current Outpatient Medications   Medication     amLODIPine (NORVASC) 10 MG tablet     ASPIRIN 81 MG OR TABS     atenolol (TENORMIN) 100 MG tablet     FISH OIL 1200 MG OR CAPS     lisinopril-hydrochlorothiazide (PRINZIDE/ZESTORETIC) 20-12.5 MG tablet     VITAMIN D, CHOLECALCIFEROL, PO     ciprofloxacin (CIPRO) 500 MG tablet     No current facility-administered medications for this visit.           Family History:     Family History   Problem Relation Age of Onset     Hypertension Mother      Alcohol/Drug Mother         alcohol     Alcohol/Drug Father         alcohol     Cancer Father         lung     Cancer Sister         lung     No known family history of  malignancy.          Social History:     Social History     Socioeconomic History     Marital status:      Spouse name: Celi     Number of children: 2     Years of education: 16     Highest education level: Not on file   Occupational History     Occupation:      Comment: [employer]     Employer: [employer]   Social Needs     Financial resource strain: Not on file     Food insecurity     Worry: Not on file     Inability: Not on file     Transportation needs     Medical: Not on file     Non-medical: Not on file   Tobacco Use     Smoking status: Never Smoker     Smokeless tobacco: Never Used   Substance and Sexual Activity     Alcohol use: Yes     Comment: 1 drink every two weeks. During Football Season     Drug use: No     Sexual activity: Yes     Partners: Female   Lifestyle     Physical activity     Days per week: Not on file     Minutes per session: Not on file     Stress: Not on file   Relationships     Social connections     Talks on phone: Not on file     Gets together: Not on file     Attends Judaism service: Not on file     Active member of club or organization: Not on file     Attends meetings of clubs or organizations: Not on file     Relationship status: Not on file     Intimate partner violence     Fear of current or ex partner: Not on file     Emotionally abused: Not on file     Physically abused: Not on file     Forced sexual activity: Not on file   Other Topics Concern      Service No     Blood Transfusions No     Caffeine Concern No     Comment: Ice Tea - 2 glasses a day     Occupational Exposure No     Hobby Hazards No     Sleep Concern No     Stress Concern No     Weight Concern No     Special Diet No     Back Care No     Exercise Yes     Comment: Stair Master, Ellipitical, weights - 6 days a week     Bike Helmet No     Seat Belt Yes     Self-Exams Yes     Parent/sibling w/ CABG, MI or angioplasty before 65F 55M? No   Social History Narrative    Balanced Diet - Yes     Osteoporosis Preventative measures-  Dairy servings per day: 2-3 servings daily    Regular Exercise -  Yes Describe stair master, walking, biking    Dental Exam up - YES - Date: 2007    Eye Exam - YES - Date: 2005    Self Testicular Exam -  No    Do you have any concerns about STD's -  No    Abuse: Current or Past (Physical, Sexual or Emotional)- No    Do you feel safe in your environment - Yes    Guns stored in the home - No    Sunscreen used - No    Seatbelts used - Yes    Lipids - YES - Date: 12-12-06    Glucose -  YES - Date: 12-12-06    Colon Cancer Screening - No    Hemoccults - NO    PSA - YES - Date: 12-12-06    Digital Rectal Exam - NO    Immunizations reviewed and up to date - Yes, last TD was 9-14-04    Tiffanie Moralez MA     Works in [employer]         Allergies:   No known drug allergies         Review of Systems:  From intake questionnaire     Skin: negative  Eyes: negative  Ears/Nose/Throat: negative  Respiratory: No shortness of breath, dyspnea on exertion, cough, or hemoptysis  Cardiovascular: No chest pain or palpitations  Gastrointestinal: negative; no nausea/vomiting, constipation or diarrhea  Genitourinary: as per HPI  Musculoskeletal: negative  Neurologic: negative  Psychiatric: negative  Hematologic/Lymphatic/Immunologic: negative  Endocrine: negative         Physical Exam:     Patient is a 64 year old  male   Vitals: Blood pressure (!) 146/82, pulse 86, height 1.829 m (6'), weight 86.2 kg (190 lb), SpO2 98 %.  Constitutional: Body mass index is 25.77 kg/m .  Alert, no acute distress, oriented, conversant  Eyes: no scleral icterus; extraocular muscles intact, moist conjunctivae  Neck: trachea midline, no thyromegaly  Ears/nose/mouth: throat/mouth:normal, good dentition  Respiratory: no respiratory distress, or pursed lip breathing  Cardiovascular: pulses strong and intact; no obvious jugular venous distension present  Gastrointestinal: soft, nontender, no organomegaly or masses,   Musculoskeletal:  extremities normal, no peripheral edema  Skin: no suspicious lesions or rashes  Neuro: Alert, oriented, speech and mentation normal  Psych: affect and mood normal, alert and oriented to person, place and time  Gait: Normal      Labs and Pathology:    I reviewed all applicable laboratory and pathology data and went over findings with patient  Significant for   Lab Results   Component Value Date    CR 0.92 10/14/2019    CR 0.92 06/08/2018    CR 0.90 05/12/2017    CR 1.07 12/18/2015    CR 0.98 12/23/2014    CR 1.10 12/20/2013    CR 1.00 12/24/2012    CR 1.06 12/30/2011    CR 1.06 12/21/2010    CR 1.14 12/14/2009     PSA   Date Value Ref Range Status   10/14/2019 9.32 (H) 0 - 4 ug/L Final     Comment:     Assay Method:  Chemiluminescence using Siemens Vista analyzer   05/12/2017 5.59 (H) 0 - 4 ug/L Final     Comment:     Assay Method:  Chemiluminescence using Siemens Vista analyzer   12/20/2013 3.01 0 - 4 ug/L Final   12/30/2011 2.74 0 - 4 ug/L Final   12/21/2010 2.14 0 - 4 ug/L Final   05/18/2009 2.13 0 - 4 ug/L Final   03/13/2008 2.19 0 - 4 ug/L Final   12/12/2006 1.41 0 - 4 ug/L Final   09/14/2004 1.06 0 - 4 ug/L Final       Imaging:    I directly visualized and reviewed all applicable imaging a with patient.    MRI Prostate 12/23/2019  IMPRESSION:  1. Based on the most suspicious abnormality, this exam is  characterized as PIRADS 5 - Clinically significant cancer is highly  likely to be present.  The most suspicious abnormality is located at  the left peripheral zone and there is high suspicion of extraprostatic  extension with possible involvement of the left neurovascular bundle.  2. No suspicious adenopathy or evidence of pelvic metastases.    NM Bone Scan 2/26/2020  IMPRESSION: No scintigraphic evidence of skeletal metastatic disease.  No abnormal uptake in the femur.     CT abd/pelvis 2/26/2020  IMPRESSION: No evidence of metastatic disease in the abdomen or  pelvis.      Outside and Past Medical records:    I  spent 10 minutes reviewing outside and past medical records.         Assessment and Plan:     Assessment: 64 year old male with Deb 4+4=8 prostate cancer, PSA 9.32. We had an extensive discussion about the significance of localized, prostate cancer. We discussed the options for treatment of a localized prostate cancer including active surveillance, brachytherapy, external beam radiation therapy, and surgical removal. We discussed that based on his Deb score he would not be an ideal candidate for active surveillance.       We discussed the relative merits of robotic assisted radical prostatectomy. We also discussed the advantages and disadvantages and roles of open surgery vs. laparoscopic (and [place] Stan assisted) surgery. The anticipated post-operative course was explained, including an anticipated 1-2 day hospital stay. Catheter will remain in place 10-14 days.      We discussed that there was no clear evidence of advantage between surgery and radiation with regard to risk of recurrence. Regarding radiation, we discussed we discussed risks including but not limited to cancer recurrence, exacerbation of voiding symptoms or hematuria, urinary retention, incontinence, stricture of the urinary tract, induction of second malignancy, and risks of rectal symptoms or bleeding.  We discussed fact that rectal symptoms may be more common with radiation than with other treatment modalities. With radiation therapy, we also discussed the difficulties in diagnosing recurrent disease at an early stage due to variability in PSA levels and the fact that most patients are not candidates for local salvage therapy when biochemical recurrence is declared.  We also discussed the significant morbidity of post-radiation local salvage therapy in terms of perioperative complications, erectile dysfunction and urinary incontinence. With surgery, we also discussed the potential advantage over radiation therapy in that biochemical  recurrence can be detected at a relatively earlier stage and that salvage radiotherapy is successful in controlling recurrent disease in a substantial proportion of patients.  We also discussed hat salvage radiotherapy was associated with a considerably more favorable morbidity profile compared to local salvage therapies for recurrent disease post-radiation.     We discussed option for further discussion with radiation oncology, but patient is interested in surgery and would prefer to defer at this time.      The risks, benefits, alternatives, and personnel involved with robotic prostatectomy were discussed in detail. Specifically, we discussed that risks include but are not limited to anesthetic complications including stroke, MI, DVT/PE, as well as risk of bleeding requiring transfusion, bowel injury, infection, lymphocele, ureteral injury, nerve injury,urine leak and other potentially unforseen complications. Also discussed the risk of bladder neck contracture and other urinary symptoms after procedure. In addition, we discussed risk long-term of urinary incontinence and erectile dysfunction following the procedure. Finally, we discussed risk for adverse pathologic features, including positive surgical margins and potential for post-surgical radiation, hormone ablation and long-term need for PSA monitoring.  All questions were answered in detail.  A written informed consent will be finalized on the morning of the procedure.    Plan:  Schedule for robotic-assisted laparoscopic radical prostatectomy, bilateral pelvic lymphadenectomy    I spent over 45 minutes with the patient.  Over half this time was spent on counseling regarding Prostate Cancer.    Brady Flannery MD  Urology  [facility]

## 2020-03-20 NOTE — LETTER
3/20/2020      RE: Dion Bowman  [address]  [facility] [address]             Chief Complaint:   Prostate Cancer         History of Present Illness:    Dion Bowman is a very pleasant 64 year old male who presents with a history of Prostate Cancer. TRUS biopsy done by Dr. Mckeon in the setting of elevated PSA of 9.32. Notes mild urinary symptoms. No hematuria or dysuria.  Normal erections. - not a high priority.     No family history of prostate cancer.    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 in one of   one needle core biopsy and 80% of   submitted tissue     F: Prostate, left apex, biopsy   - Adenocarcinoma, acinar type, Deb's grade 3/3 with high-grade   prostatic intraepithelial neoplasia, tumor   involves one of one needle core biopsy and 30% of submitted tissue     G: Prostate, right lateral base, biopsy   - Adenocarcinoma, acinar type, Deb's grade 3/3 in one of one needle   core biopsy and 20% of submitted   tissue     H: Prostate, right lateral mid, biopsy   -Adenocarcinoma, acinar type, Charlotte's grade 3/4 in one of one needle   core biopsy and 30% of submitted   tissue, high-grade prostatic intraepithelial neoplasia     I: Prostate, right lateral apex, biopsy   - Adenocarcinoma, Deb's grade 3/4 with focal ductal component,   involving one of one needle core biopsy and   20% of submitted tissue.     J: Prostate, right base, biopsy   - Adenocarcinoma,  acinar type, Olney's grade 3/3 involving one of one   needle core biopsy and 80% of   submitted tissue     K: Prostate, right mid, biopsy   - Adenocarcinoma, Olney's grade 3/3 involving one of one needle core   biopsy and 10% of submitted tissue,   high-grade prostatic intraepithelial neoplasia     L: Prostate, right apex, biopsy   - Benign prostate tissue     M: Prostate, right, lesion, biopsy    Adenocarcinoma, acinar type, acinar type, Olney's grade 3/3 in one of 2    needle core biopsies and 40% of   submitted tissue     N: Prostate, left base, biopsy   - Adenocarcinoma, mixed ductal and acinar type, Olney's grade 4/3 in 2   of 3 needle core biopsies and 70% of   submitted tissue     COMMENT:   A minor component of ductal carcinoma cannot be excluded in those lesions   designated high-grade   intraepithelial neoplasia            Past Medical History:     Past Medical History:   Diagnosis Date     CARDIOVASCULAR SCREENING; LDL GOAL LESS THAN 130 5/9/2010     Hypertension goal BP (blood pressure) < 140/90 12/16/2010     Impaired fasting blood sugar 12/21/2010          Past Surgical History:     Past Surgical History:   Procedure Laterality Date     HC COLONOSCOPY THRU STOMA, DIAGNOSTIC  3/09/2009    Negative - Due in 2019     HERNIA REPAIR, INGUINAL RT/LT  1/26/2006    Left Inguinal Hernia          Medications     Current Outpatient Medications   Medication     amLODIPine (NORVASC) 10 MG tablet     ASPIRIN 81 MG OR TABS     atenolol (TENORMIN) 100 MG tablet     FISH OIL 1200 MG OR CAPS     lisinopril-hydrochlorothiazide (PRINZIDE/ZESTORETIC) 20-12.5 MG tablet     VITAMIN D, CHOLECALCIFEROL, PO     ciprofloxacin (CIPRO) 500 MG tablet     No current facility-administered medications for this visit.           Family History:     Family History   Problem Relation Age of Onset     Hypertension Mother      Alcohol/Drug Mother         alcohol     Alcohol/Drug Father         alcohol     Cancer Father         lung      Cancer Sister         lung     No known family history of  malignancy.         Social History:     Social History     Socioeconomic History     Marital status:      Spouse name: Celi     Number of children: 2     Years of education: 16     Highest education level: Not on file   Occupational History     Occupation:      Comment: [employer]     Employer: [employer]   Social Needs     Financial resource strain: Not on file     Food insecurity     Worry: Not on file     Inability: Not on file     Transportation needs     Medical: Not on file     Non-medical: Not on file   Tobacco Use     Smoking status: Never Smoker     Smokeless tobacco: Never Used   Substance and Sexual Activity     Alcohol use: Yes     Comment: 1 drink every two weeks. During Football Season     Drug use: No     Sexual activity: Yes     Partners: Female   Lifestyle     Physical activity     Days per week: Not on file     Minutes per session: Not on file     Stress: Not on file   Relationships     Social connections     Talks on phone: Not on file     Gets together: Not on file     Attends Zoroastrianism service: Not on file     Active member of club or organization: Not on file     Attends meetings of clubs or organizations: Not on file     Relationship status: Not on file     Intimate partner violence     Fear of current or ex partner: Not on file     Emotionally abused: Not on file     Physically abused: Not on file     Forced sexual activity: Not on file   Other Topics Concern      Service No     Blood Transfusions No     Caffeine Concern No     Comment: Ice Tea - 2 glasses a day     Occupational Exposure No     Hobby Hazards No     Sleep Concern No     Stress Concern No     Weight Concern No     Special Diet No     Back Care No     Exercise Yes     Comment: Stair Master, Ellipitical, weights - 6 days a week     Bike Helmet No     Seat Belt Yes     Self-Exams Yes     Parent/sibling w/ CABG, MI or  angioplasty before 65F 55M? No   Social History Narrative    Balanced Diet - Yes    Osteoporosis Preventative measures-  Dairy servings per day: 2-3 servings daily    Regular Exercise -  Yes Describe stair master, walking, biking    Dental Exam up - YES - Date: 2007    Eye Exam - YES - Date: 2005    Self Testicular Exam -  No    Do you have any concerns about STD's -  No    Abuse: Current or Past (Physical, Sexual or Emotional)- No    Do you feel safe in your environment - Yes    Guns stored in the home - No    Sunscreen used - No    Seatbelts used - Yes    Lipids - YES - Date: 12-12-06    Glucose -  YES - Date: 12-12-06    Colon Cancer Screening - No    Hemoccults - NO    PSA - YES - Date: 12-12-06    Digital Rectal Exam - NO    Immunizations reviewed and up to date - Yes, last TD was 9-14-04    Tiffanie Moralez MA     Works in [employer]         Allergies:   No known drug allergies         Review of Systems:  From intake questionnaire     Skin: negative  Eyes: negative  Ears/Nose/Throat: negative  Respiratory: No shortness of breath, dyspnea on exertion, cough, or hemoptysis  Cardiovascular: No chest pain or palpitations  Gastrointestinal: negative; no nausea/vomiting, constipation or diarrhea  Genitourinary: as per HPI  Musculoskeletal: negative  Neurologic: negative  Psychiatric: negative  Hematologic/Lymphatic/Immunologic: negative  Endocrine: negative         Physical Exam:     Patient is a 64 year old  male   Vitals: Blood pressure (!) 146/82, pulse 86, height 1.829 m (6'), weight 86.2 kg (190 lb), SpO2 98 %.  Constitutional: Body mass index is 25.77 kg/m .  Alert, no acute distress, oriented, conversant  Eyes: no scleral icterus; extraocular muscles intact, moist conjunctivae  Neck: trachea midline, no thyromegaly  Ears/nose/mouth: throat/mouth:normal, good dentition  Respiratory: no respiratory distress, or pursed lip breathing  Cardiovascular: pulses strong and intact; no obvious jugular venous distension  present  Gastrointestinal: soft, nontender, no organomegaly or masses,   Musculoskeletal: extremities normal, no peripheral edema  Skin: no suspicious lesions or rashes  Neuro: Alert, oriented, speech and mentation normal  Psych: affect and mood normal, alert and oriented to person, place and time  Gait: Normal      Labs and Pathology:    I reviewed all applicable laboratory and pathology data and went over findings with patient  Significant for   Lab Results   Component Value Date    CR 0.92 10/14/2019    CR 0.92 06/08/2018    CR 0.90 05/12/2017    CR 1.07 12/18/2015    CR 0.98 12/23/2014    CR 1.10 12/20/2013    CR 1.00 12/24/2012    CR 1.06 12/30/2011    CR 1.06 12/21/2010    CR 1.14 12/14/2009     PSA   Date Value Ref Range Status   10/14/2019 9.32 (H) 0 - 4 ug/L Final     Comment:     Assay Method:  Chemiluminescence using Siemens Vista analyzer   05/12/2017 5.59 (H) 0 - 4 ug/L Final     Comment:     Assay Method:  Chemiluminescence using Siemens Vista analyzer   12/20/2013 3.01 0 - 4 ug/L Final   12/30/2011 2.74 0 - 4 ug/L Final   12/21/2010 2.14 0 - 4 ug/L Final   05/18/2009 2.13 0 - 4 ug/L Final   03/13/2008 2.19 0 - 4 ug/L Final   12/12/2006 1.41 0 - 4 ug/L Final   09/14/2004 1.06 0 - 4 ug/L Final       Imaging:    I directly visualized and reviewed all applicable imaging a with patient.    MRI Prostate 12/23/2019  IMPRESSION:  1. Based on the most suspicious abnormality, this exam is  characterized as PIRADS 5 - Clinically significant cancer is highly  likely to be present.  The most suspicious abnormality is located at  the left peripheral zone and there is high suspicion of extraprostatic  extension with possible involvement of the left neurovascular bundle.  2. No suspicious adenopathy or evidence of pelvic metastases.    NM Bone Scan 2/26/2020  IMPRESSION: No scintigraphic evidence of skeletal metastatic disease.  No abnormal uptake in the femur.     CT abd/pelvis 2/26/2020  IMPRESSION: No evidence of  metastatic disease in the abdomen or  pelvis.      Outside and Past Medical records:    I spent 10 minutes reviewing outside and past medical records.         Assessment and Plan:     Assessment: 64 year old male with Deb 4+4=8 prostate cancer, PSA 9.32. We had an extensive discussion about the significance of localized, prostate cancer. We discussed the options for treatment of a localized prostate cancer including active surveillance, brachytherapy, external beam radiation therapy, and surgical removal. We discussed that based on his Deb score he would not be an ideal candidate for active surveillance.       We discussed the relative merits of robotic assisted radical prostatectomy. We also discussed the advantages and disadvantages and roles of open surgery vs. laparoscopic (and Da Stan assisted) surgery. The anticipated post-operative course was explained, including an anticipated 1-2 day hospital stay. Catheter will remain in place 10-14 days.      We discussed that there was no clear evidence of advantage between surgery and radiation with regard to risk of recurrence. Regarding radiation, we discussed we discussed risks including but not limited to cancer recurrence, exacerbation of voiding symptoms or hematuria, urinary retention, incontinence, stricture of the urinary tract, induction of second malignancy, and risks of rectal symptoms or bleeding.  We discussed fact that rectal symptoms may be more common with radiation than with other treatment modalities. With radiation therapy, we also discussed the difficulties in diagnosing recurrent disease at an early stage due to variability in PSA levels and the fact that most patients are not candidates for local salvage therapy when biochemical recurrence is declared.  We also discussed the significant morbidity of post-radiation local salvage therapy in terms of perioperative complications, erectile dysfunction and urinary incontinence. With surgery, we  also discussed the potential advantage over radiation therapy in that biochemical recurrence can be detected at a relatively earlier stage and that salvage radiotherapy is successful in controlling recurrent disease in a substantial proportion of patients.  We also discussed hat salvage radiotherapy was associated with a considerably more favorable morbidity profile compared to local salvage therapies for recurrent disease post-radiation.     We discussed option for further discussion with radiation oncology, but patient is interested in surgery and would prefer to defer at this time.      The risks, benefits, alternatives, and personnel involved with robotic prostatectomy were discussed in detail. Specifically, we discussed that risks include but are not limited to anesthetic complications including stroke, MI, DVT/PE, as well as risk of bleeding requiring transfusion, bowel injury, infection, lymphocele, ureteral injury, nerve injury,urine leak and other potentially unforseen complications. Also discussed the risk of bladder neck contracture and other urinary symptoms after procedure. In addition, we discussed risk long-term of urinary incontinence and erectile dysfunction following the procedure. Finally, we discussed risk for adverse pathologic features, including positive surgical margins and potential for post-surgical radiation, hormone ablation and long-term need for PSA monitoring.  All questions were answered in detail.  A written informed consent will be finalized on the morning of the procedure.    Plan:  Schedule for robotic-assisted laparoscopic radical prostatectomy, bilateral pelvic lymphadenectomy    I spent over 45 minutes with the patient.  Over half this time was spent on counseling regarding Prostate Cancer.    Brady Flannery MD  Urology  [facility]        Brady Flannery MD

## 2020-03-20 NOTE — NURSING NOTE
Chief Complaint   Patient presents with     Clinic Care Coordination - Follow-up     Pt here for prostate cancer follow-up     Estelle Mills CMA

## 2020-04-27 ENCOUNTER — OFFICE VISIT (OUTPATIENT)
Dept: FAMILY MEDICINE | Facility: CLINIC | Age: 65
End: 2020-04-27
Payer: COMMERCIAL

## 2020-04-27 VITALS
RESPIRATION RATE: 16 BRPM | DIASTOLIC BLOOD PRESSURE: 88 MMHG | OXYGEN SATURATION: 97 % | WEIGHT: 195 LBS | HEIGHT: 72 IN | BODY MASS INDEX: 26.41 KG/M2 | HEART RATE: 88 BPM | SYSTOLIC BLOOD PRESSURE: 138 MMHG | TEMPERATURE: 98.9 F

## 2020-04-27 DIAGNOSIS — C61 PROSTATE CANCER (H): ICD-10-CM

## 2020-04-27 DIAGNOSIS — Z01.818 PREOP GENERAL PHYSICAL EXAM: Primary | ICD-10-CM

## 2020-04-27 LAB
ALBUMIN SERPL-MCNC: 4 G/DL (ref 3.4–5)
ALP SERPL-CCNC: 78 U/L (ref 40–150)
ALT SERPL W P-5'-P-CCNC: 23 U/L (ref 0–70)
ANION GAP SERPL CALCULATED.3IONS-SCNC: 7 MMOL/L (ref 3–14)
AST SERPL W P-5'-P-CCNC: 18 U/L (ref 0–45)
BASOPHILS # BLD AUTO: 0 10E9/L (ref 0–0.2)
BASOPHILS NFR BLD AUTO: 0.2 %
BILIRUB SERPL-MCNC: 0.7 MG/DL (ref 0.2–1.3)
BUN SERPL-MCNC: 14 MG/DL (ref 7–30)
CALCIUM SERPL-MCNC: 9.1 MG/DL (ref 8.5–10.1)
CHLORIDE SERPL-SCNC: 98 MMOL/L (ref 94–109)
CO2 SERPL-SCNC: 27 MMOL/L (ref 20–32)
CREAT SERPL-MCNC: 0.89 MG/DL (ref 0.66–1.25)
DIFFERENTIAL METHOD BLD: NORMAL
EOSINOPHIL # BLD AUTO: 0 10E9/L (ref 0–0.7)
EOSINOPHIL NFR BLD AUTO: 0.1 %
ERYTHROCYTE [DISTWIDTH] IN BLOOD BY AUTOMATED COUNT: 11.9 % (ref 10–15)
GFR SERPL CREATININE-BSD FRML MDRD: >90 ML/MIN/{1.73_M2}
GLUCOSE SERPL-MCNC: 128 MG/DL (ref 70–99)
HCT VFR BLD AUTO: 42.5 % (ref 40–53)
HGB BLD-MCNC: 15 G/DL (ref 13.3–17.7)
LYMPHOCYTES # BLD AUTO: 0.9 10E9/L (ref 0.8–5.3)
LYMPHOCYTES NFR BLD AUTO: 10 %
MCH RBC QN AUTO: 32.2 PG (ref 26.5–33)
MCHC RBC AUTO-ENTMCNC: 35.3 G/DL (ref 31.5–36.5)
MCV RBC AUTO: 91 FL (ref 78–100)
MONOCYTES # BLD AUTO: 0.5 10E9/L (ref 0–1.3)
MONOCYTES NFR BLD AUTO: 5.2 %
NEUTROPHILS # BLD AUTO: 7.9 10E9/L (ref 1.6–8.3)
NEUTROPHILS NFR BLD AUTO: 84.5 %
PLATELET # BLD AUTO: 285 10E9/L (ref 150–450)
POTASSIUM SERPL-SCNC: 3.8 MMOL/L (ref 3.4–5.3)
PROT SERPL-MCNC: 8.1 G/DL (ref 6.8–8.8)
RBC # BLD AUTO: 4.66 10E12/L (ref 4.4–5.9)
SODIUM SERPL-SCNC: 132 MMOL/L (ref 133–144)
WBC # BLD AUTO: 9.3 10E9/L (ref 4–11)

## 2020-04-27 PROCEDURE — 80053 COMPREHEN METABOLIC PANEL: CPT | Performed by: PHYSICIAN ASSISTANT

## 2020-04-27 PROCEDURE — 36415 COLL VENOUS BLD VENIPUNCTURE: CPT | Performed by: PHYSICIAN ASSISTANT

## 2020-04-27 PROCEDURE — 99215 OFFICE O/P EST HI 40 MIN: CPT | Performed by: PHYSICIAN ASSISTANT

## 2020-04-27 PROCEDURE — 93000 ELECTROCARDIOGRAM COMPLETE: CPT | Performed by: PHYSICIAN ASSISTANT

## 2020-04-27 PROCEDURE — 85025 COMPLETE CBC W/AUTO DIFF WBC: CPT | Performed by: PHYSICIAN ASSISTANT

## 2020-04-27 ASSESSMENT — MIFFLIN-ST. JEOR: SCORE: 1713.54

## 2020-04-27 NOTE — PROGRESS NOTES
[facility] [address] [place] [address]  [phone]  Dept: [phone]    PRE-OP EVALUATION:  Today's date: 2020    Dion Bowman (: 1955) presents for pre-operative evaluation assessment as requested by Dr. Flannery.  He requires evaluation and anesthesia risk assessment prior to undergoing surgery/procedure for treatment of ROBOTIC ASSISTED PROSTATECTOMY, WITH PELVIC LYMPHADENECTOMY .    Fax number for surgical facility: [facility] [place]  Primary Physician: Chance Guzmán  Type of Anesthesia Anticipated: General    Patient has a Health Care Directive or Living Will:  NO    Preop Questions 2020   Who is doing your surgery? Dr. Flannery   What are you having done? robotic assisted prostatectomy   Date of Surgery/Procedure: 20   Facility or Hospital where procedure/surgery will be performed: [facility]   1.  Do you have a history of Heart attack, stroke, stent, coronary bypass surgery, or other heart surgery? No   2.  Do you ever have any pain or discomfort in your chest? No   3.  Do you have a history of  Heart Failure? No   4.   Are you troubled by shortness of breath when:  walking on a level surface, or up a slight hill, or at night? No   5.  Do you currently have a cold, bronchitis or other respiratory infection? No   6.  Do you have a cough, shortness of breath, or wheezing? No   7.  Do you sometimes get pains in the calves of your legs when you walk? No   8. Do you or anyone in your family have previous history of blood clots? No   9.  Do you or does anyone in your family have a serious bleeding problem such as prolonged bleeding following surgeries or cuts? No   10. Have you ever had problems with anemia or been told to take iron pills? No   11. Have you had any abnormal blood loss such as black, tarry or bloody stools? No   12. Have you ever had a blood transfusion? No   13. Have you or any of your relatives ever had  problems with anesthesia? No   14. Do you have sleep apnea, excessive snoring or daytime drowsiness? No   15. Do you have any prosthetic heart valves? No   16. Do you have prosthetic joints? No         HPI:     HPI related to upcoming procedure:     64 year old male with a history of hypertension and impaired fasting glucose presenting for preoperative exam. His PSA was elevated to 9.32 at a recent physical and he was referred to urology for further evaluation. Since this time he has undergone prostate MRI characterizing the disease as PIRAD 5, a NM bone scan which was negative for skeletal metastatic disease, CT abdomen and pelvis which also showed no evidence of metastatic disease, and TRUS biopsy was performed which confirmed prostate cancer. The patient reports he has been overall asymptomatic. Some mild decreased flow to urinary stream. He is otherwise feeling well.       HYPERTENSION - Patient has longstanding history of HTN , currently denies any symptoms referable to elevated blood pressure. Specifically denies chest pain, palpitations, dyspnea, orthopnea, PND or peripheral edema. Blood pressure readings have been in normal range. Current medication regimen is as listed below. Patient denies any side effects of medication.       MEDICAL HISTORY:     Patient Active Problem List    Diagnosis Date Noted     Prostate cancer (H) 03/20/2020     Priority: Medium     Impaired fasting blood sugar 12/21/2010     Priority: Medium     Uncontrolled hypertension 12/16/2010     Priority: Medium     CARDIOVASCULAR SCREENING; LDL GOAL LESS THAN 130 05/09/2010     Priority: Medium      Past Medical History:   Diagnosis Date     Cancer (H) 2020    prostate     CARDIOVASCULAR SCREENING; LDL GOAL LESS THAN 130 5/9/2010     Hypertension goal BP (blood pressure) < 140/90 12/16/2010     Impaired fasting blood sugar 12/21/2010     Past Surgical History:   Procedure Laterality Date     ABDOMEN SURGERY  2008    hernia     HC  COLONOSCOPY THRU STOMA, DIAGNOSTIC  3/09/2009    Negative - Due in 2019     HERNIA REPAIR, INGUINAL RT/LT  1/26/2006    Left Inguinal Hernia     Current Outpatient Medications   Medication Sig Dispense Refill     amLODIPine (NORVASC) 10 MG tablet Take 1 tablet (10 mg) by mouth daily 90 tablet 3     atenolol (TENORMIN) 100 MG tablet Take 1 tablet (100 mg) by mouth daily 90 tablet 3     lisinopril-hydrochlorothiazide (PRINZIDE/ZESTORETIC) 20-12.5 MG tablet Take 2 tablets by mouth every morning 180 tablet 3     VITAMIN D, CHOLECALCIFEROL, PO Take 1,000 Units by mouth daily       ASPIRIN 81 MG OR TABS ONE DAILY (Patient not taking: No sig reported) 100 3     ciprofloxacin (CIPRO) 500 MG tablet Take 1 tablet (500 mg) by mouth 2 times daily 6 tablet 0     FISH OIL 1200 MG OR CAPS 2 caps every other day       OTC products: None, except as noted above    Allergies   Allergen Reactions     No Known Drug Allergies       Latex Allergy: NO    Social History     Tobacco Use     Smoking status: Never Smoker     Smokeless tobacco: Never Used   Substance Use Topics     Alcohol use: Yes     Comment: 1 drink every two weeks. During Football Season     History   Drug Use No       REVIEW OF SYSTEMS:   CONSTITUTIONAL: NEGATIVE for fever, chills, change in weight  INTEGUMENTARY/SKIN: NEGATIVE for worrisome rashes, moles or lesions  EYES: NEGATIVE for vision changes or irritation  ENT/MOUTH: NEGATIVE for ear, mouth and throat problems  RESP: NEGATIVE for significant cough or SOB  BREAST: NEGATIVE for masses, tenderness or discharge  CV: NEGATIVE for chest pain, palpitations or peripheral edema  GI: NEGATIVE for nausea, abdominal pain, heartburn, or change in bowel habits  : NEGATIVE for frequency, dysuria, or hematuria, POSITIVE for some decreased stream   MUSCULOSKELETAL: NEGATIVE for significant arthralgias or myalgia  NEURO: NEGATIVE for weakness, dizziness or paresthesias  ENDOCRINE: NEGATIVE for temperature intolerance, skin/hair  "changes  HEME: NEGATIVE for bleeding problems  PSYCHIATRIC: NEGATIVE for changes in mood or affect    EXAM:   /88   Pulse 88   Temp 98.9  F (37.2  C) (Oral)   Resp 16   Ht 1.822 m (5' 11.75\")   Wt 88.5 kg (195 lb)   SpO2 97%   BMI 26.63 kg/m      GENERAL APPEARANCE: healthy, alert and no distress     EYES: EOMI,  PERRL     HENT: ear canals and TM's normal and nose and mouth without ulcers or lesions     NECK: no adenopathy, no asymmetry, masses, or scars and thyroid normal to palpation     RESP: lungs clear to auscultation - no rales, rhonchi or wheezes     CV: regular rates and rhythm, normal S1 S2, no S3 or S4 and no murmur, click or rub     ABDOMEN:  soft, nontender, no HSM or masses and bowel sounds normal     MS: extremities normal- no gross deformities noted, no evidence of inflammation in joints, FROM in all extremities.     SKIN: no suspicious lesions or rashes     NEURO: Normal strength and tone, sensory exam grossly normal, mentation intact and speech normal     PSYCH: mentation appears normal. and affect normal/bright     LYMPHATICS: No cervical adenopathy    DIAGNOSTICS:     EKG: appears normal, NSR, normal axis, normal intervals, no acute ST/T changes c/w ischemia  Labs Resulted Today:   Results for orders placed or performed in visit on 04/27/20   CBC with platelets and differential     Status: None   Result Value Ref Range    WBC 9.3 4.0 - 11.0 10e9/L    RBC Count 4.66 4.4 - 5.9 10e12/L    Hemoglobin 15.0 13.3 - 17.7 g/dL    Hematocrit 42.5 40.0 - 53.0 %    MCV 91 78 - 100 fl    MCH 32.2 26.5 - 33.0 pg    MCHC 35.3 31.5 - 36.5 g/dL    RDW 11.9 10.0 - 15.0 %    Platelet Count 285 150 - 450 10e9/L    % Neutrophils 84.5 %    % Lymphocytes 10.0 %    % Monocytes 5.2 %    % Eosinophils 0.1 %    % Basophils 0.2 %    Absolute Neutrophil 7.9 1.6 - 8.3 10e9/L    Absolute Lymphocytes 0.9 0.8 - 5.3 10e9/L    Absolute Monocytes 0.5 0.0 - 1.3 10e9/L    Absolute Eosinophils 0.0 0.0 - 0.7 10e9/L    " Absolute Basophils 0.0 0.0 - 0.2 10e9/L    Diff Method Automated Method    Comprehensive metabolic panel (BMP + Alb, Alk Phos, ALT, AST, Total. Bili, TP)     Status: Abnormal   Result Value Ref Range    Sodium 132 (L) 133 - 144 mmol/L    Potassium 3.8 3.4 - 5.3 mmol/L    Chloride 98 94 - 109 mmol/L    Carbon Dioxide 27 20 - 32 mmol/L    Anion Gap 7 3 - 14 mmol/L    Glucose 128 (H) 70 - 99 mg/dL    Urea Nitrogen 14 7 - 30 mg/dL    Creatinine 0.89 0.66 - 1.25 mg/dL    GFR Estimate >90 >60 mL/min/[1.73_m2]    GFR Estimate If Black >90 >60 mL/min/[1.73_m2]    Calcium 9.1 8.5 - 10.1 mg/dL    Bilirubin Total 0.7 0.2 - 1.3 mg/dL    Albumin 4.0 3.4 - 5.0 g/dL    Protein Total 8.1 6.8 - 8.8 g/dL    Alkaline Phosphatase 78 40 - 150 U/L    ALT 23 0 - 70 U/L    AST 18 0 - 45 U/L     Labs Drawn and in Process:   Unresulted Labs Ordered in the Past 30 Days of this Admission     No orders found from 3/28/2020 to 4/28/2020.          Recent Labs   Lab Test 10/14/19  0911 06/08/18  0843  12/24/12  0845    137   < > 140   POTASSIUM 4.2 4.0   < > 4.6   CR 0.92 0.92   < > 1.00   A1C  --  4.9  --  5.3    < > = values in this interval not displayed.        IMPRESSION:   Reason for surgery/procedure: Prostate cancer   Diagnosis/reason for consult: Preoperative exam prior to robotic assisted prostatectomy with pelvic lymphadenectomy     The proposed surgical procedure is considered INTERMEDIATE risk.    REVISED CARDIAC RISK INDEX  The patient has the following serious cardiovascular risks for perioperative complications such as (MI, PE, VFib and 3  AV Block):  No serious cardiac risks  INTERPRETATION: 0 risks: Class I (very low risk - 0.4% complication rate)    The patient has the following additional risks for perioperative complications:  No identified additional risks      ICD-10-CM    1. Preop general physical exam  Z01.818 EKG 12-lead complete w/read - Clinics     CBC with platelets and differential     Comprehensive metabolic  panel (BMP + Alb, Alk Phos, ALT, AST, Total. Bili, TP)   2. Prostate cancer (H)  C61        RECOMMENDATIONS:     --Consult hospital rounder / IM to assist post-op medical management    --Patient is to take all scheduled medications on the day of surgery EXCEPT for modifications listed below.    Anticoagulant or Antiplatelet Medication Use  ASPIRIN: Discontinue ASA 7-10 days prior to procedure to reduce bleeding risk.  It should be resumed post-operatively.  FISH OIL: Discontinue fish oil 7-10 days prior to procedure         ACE Inhibitor or Angiotensin Receptor Blocker (ARB) Use  Ace inhibitor or Angiotensin Receptor Blocker (ARB) and should HOLD this medication for the 24 hours prior to surgery.      APPROVAL GIVEN to proceed with proposed procedure, without further diagnostic evaluation       Signed Electronically by: Jevon Quinones PA-C    Copy of this evaluation report is provided to requesting physician.    [facility] Preop Guidelines    Revised Cardiac Risk Index

## 2020-04-28 RX ORDER — ASPIRIN 81 MG/1
81 TABLET ORAL DAILY
COMMUNITY
End: 2022-04-04

## 2020-04-28 NOTE — PROGRESS NOTES
PTA medications updated by Medication Scribe day before surgery via phone call with patient      -LAST DOSES ENTERED BY NURSE-    Medication history sources: Patient, Surescripts and H&P  Medication history source reliability: Good  Adherence assessment: N/A Not Observed    Significant changes made to the medication list:  None      Additional medication history information:   None        Prior to Admission medications    Medication Sig Last Dose Taking? Auth Provider   amLODIPine (NORVASC) 10 MG tablet Take 1 tablet (10 mg) by mouth daily  at AM Yes Chance Guzmán MD   aspirin 81 MG EC tablet Take 81 mg by mouth daily more than a week at AM Yes Reported, Patient   atenolol (TENORMIN) 100 MG tablet Take 1 tablet (100 mg) by mouth daily  at AM Yes Chance Guzmán MD   FISH OIL 1200 MG OR CAPS Take 2 capsules by mouth every other day  more than a week Yes Reported, Patient   lisinopril-hydrochlorothiazide (PRINZIDE/ZESTORETIC) 20-12.5 MG tablet Take 2 tablets by mouth every morning  at AM Yes Chance Guzmán MD   VITAMIN D, CHOLECALCIFEROL, PO Take 1,000 Units by mouth daily more than a week at AM Yes Reported, Patient

## 2020-04-29 ENCOUNTER — HOSPITAL ENCOUNTER (OUTPATIENT)
Facility: CLINIC | Age: 65
Discharge: HOME | End: 2020-04-30
Attending: UROLOGY | Admitting: UROLOGY
Payer: COMMERCIAL

## 2020-04-29 ENCOUNTER — ANESTHESIA (OUTPATIENT)
Dept: SURGERY | Facility: CLINIC | Age: 65
End: 2020-04-29
Payer: COMMERCIAL

## 2020-04-29 ENCOUNTER — ANESTHESIA EVENT (OUTPATIENT)
Dept: SURGERY | Facility: CLINIC | Age: 65
End: 2020-04-29
Payer: COMMERCIAL

## 2020-04-29 DIAGNOSIS — C61 PROSTATE CANCER (H): Primary | ICD-10-CM

## 2020-04-29 LAB
ABO + RH BLD: NORMAL
ABO + RH BLD: NORMAL
BLD GP AB SCN SERPL QL: NORMAL
BLOOD BANK CMNT PATIENT-IMP: NORMAL
POTASSIUM SERPL-SCNC: 3.8 MMOL/L (ref 3.4–5.3)
SPECIMEN EXP DATE BLD: NORMAL

## 2020-04-29 PROCEDURE — 86901 BLOOD TYPING SEROLOGIC RH(D): CPT | Performed by: UROLOGY

## 2020-04-29 PROCEDURE — 37000009 ZZH ANESTHESIA TECHNICAL FEE, EACH ADDTL 15 MIN: Performed by: UROLOGY

## 2020-04-29 PROCEDURE — 25800030 ZZH RX IP 258 OP 636: Performed by: STUDENT IN AN ORGANIZED HEALTH CARE EDUCATION/TRAINING PROGRAM

## 2020-04-29 PROCEDURE — 86900 BLOOD TYPING SEROLOGIC ABO: CPT | Performed by: UROLOGY

## 2020-04-29 PROCEDURE — 88307 TISSUE EXAM BY PATHOLOGIST: CPT | Mod: 26 | Performed by: UROLOGY

## 2020-04-29 PROCEDURE — 25800030 ZZH RX IP 258 OP 636: Performed by: ANESTHESIOLOGY

## 2020-04-29 PROCEDURE — 37000008 ZZH ANESTHESIA TECHNICAL FEE, 1ST 30 MIN: Performed by: UROLOGY

## 2020-04-29 PROCEDURE — 25000132 ZZH RX MED GY IP 250 OP 250 PS 637: Performed by: STUDENT IN AN ORGANIZED HEALTH CARE EDUCATION/TRAINING PROGRAM

## 2020-04-29 PROCEDURE — 71000012 ZZH RECOVERY PHASE 1 LEVEL 1 FIRST HR: Performed by: UROLOGY

## 2020-04-29 PROCEDURE — 25000128 H RX IP 250 OP 636: Performed by: NURSE ANESTHETIST, CERTIFIED REGISTERED

## 2020-04-29 PROCEDURE — 88309 TISSUE EXAM BY PATHOLOGIST: CPT | Mod: 26 | Performed by: UROLOGY

## 2020-04-29 PROCEDURE — 36000085 ZZH SURGERY LEVEL 8 1ST 30 MIN: Performed by: UROLOGY

## 2020-04-29 PROCEDURE — 25000566 ZZH SEVOFLURANE, EA 15 MIN: Performed by: UROLOGY

## 2020-04-29 PROCEDURE — 25000132 ZZH RX MED GY IP 250 OP 250 PS 637: Performed by: UROLOGY

## 2020-04-29 PROCEDURE — 40000170 ZZH STATISTIC PRE-PROCEDURE ASSESSMENT II: Performed by: UROLOGY

## 2020-04-29 PROCEDURE — 25000128 H RX IP 250 OP 636: Performed by: UROLOGY

## 2020-04-29 PROCEDURE — 25000125 ZZHC RX 250: Performed by: NURSE ANESTHETIST, CERTIFIED REGISTERED

## 2020-04-29 PROCEDURE — 36415 COLL VENOUS BLD VENIPUNCTURE: CPT | Performed by: ANESTHESIOLOGY

## 2020-04-29 PROCEDURE — S2900 ROBOTIC SURGICAL SYSTEM: HCPCS | Performed by: UROLOGY

## 2020-04-29 PROCEDURE — 88309 TISSUE EXAM BY PATHOLOGIST: CPT | Performed by: UROLOGY

## 2020-04-29 PROCEDURE — 86850 RBC ANTIBODY SCREEN: CPT | Performed by: UROLOGY

## 2020-04-29 PROCEDURE — 25000128 H RX IP 250 OP 636: Performed by: STUDENT IN AN ORGANIZED HEALTH CARE EDUCATION/TRAINING PROGRAM

## 2020-04-29 PROCEDURE — 38571 LAPAROSCOPY LYMPHADENECTOMY: CPT | Mod: 51 | Performed by: UROLOGY

## 2020-04-29 PROCEDURE — 55866 LAPS SURG PRST8ECT RPBIC RAD: CPT | Performed by: UROLOGY

## 2020-04-29 PROCEDURE — 36000087 ZZH SURGERY LEVEL 8 EA 15 ADDTL MIN: Performed by: UROLOGY

## 2020-04-29 PROCEDURE — 27210794 ZZH OR GENERAL SUPPLY STERILE: Performed by: UROLOGY

## 2020-04-29 PROCEDURE — 84132 ASSAY OF SERUM POTASSIUM: CPT | Performed by: ANESTHESIOLOGY

## 2020-04-29 PROCEDURE — 88307 TISSUE EXAM BY PATHOLOGIST: CPT | Performed by: UROLOGY

## 2020-04-29 RX ORDER — ACETAMINOPHEN 325 MG/1
650 TABLET ORAL EVERY 4 HOURS
Status: DISCONTINUED | OUTPATIENT
Start: 2020-04-29 | End: 2020-04-30 | Stop reason: HOSPADM

## 2020-04-29 RX ORDER — ONDANSETRON 4 MG/1
4 TABLET, ORALLY DISINTEGRATING ORAL EVERY 30 MIN PRN
Status: DISCONTINUED | OUTPATIENT
Start: 2020-04-29 | End: 2020-04-29 | Stop reason: HOSPADM

## 2020-04-29 RX ORDER — AMOXICILLIN 250 MG
1 CAPSULE ORAL 2 TIMES DAILY
Status: DISCONTINUED | OUTPATIENT
Start: 2020-04-29 | End: 2020-04-30 | Stop reason: HOSPADM

## 2020-04-29 RX ORDER — SODIUM CHLORIDE, SODIUM LACTATE, POTASSIUM CHLORIDE, CALCIUM CHLORIDE 600; 310; 30; 20 MG/100ML; MG/100ML; MG/100ML; MG/100ML
INJECTION, SOLUTION INTRAVENOUS CONTINUOUS
Status: DISCONTINUED | OUTPATIENT
Start: 2020-04-29 | End: 2020-04-29 | Stop reason: HOSPADM

## 2020-04-29 RX ORDER — NEOSTIGMINE METHYLSULFATE 1 MG/ML
VIAL (ML) INJECTION PRN
Status: DISCONTINUED | OUTPATIENT
Start: 2020-04-29 | End: 2020-04-29

## 2020-04-29 RX ORDER — KETOROLAC TROMETHAMINE 30 MG/ML
INJECTION, SOLUTION INTRAMUSCULAR; INTRAVENOUS PRN
Status: DISCONTINUED | OUTPATIENT
Start: 2020-04-29 | End: 2020-04-29

## 2020-04-29 RX ORDER — ONDANSETRON 4 MG/1
4 TABLET, ORALLY DISINTEGRATING ORAL EVERY 6 HOURS PRN
Status: DISCONTINUED | OUTPATIENT
Start: 2020-04-29 | End: 2020-04-30 | Stop reason: HOSPADM

## 2020-04-29 RX ORDER — CEFAZOLIN SODIUM 1 G/3ML
1 INJECTION, POWDER, FOR SOLUTION INTRAMUSCULAR; INTRAVENOUS SEE ADMIN INSTRUCTIONS
Status: DISCONTINUED | OUTPATIENT
Start: 2020-04-29 | End: 2020-04-29 | Stop reason: HOSPADM

## 2020-04-29 RX ORDER — LIDOCAINE HYDROCHLORIDE 20 MG/ML
INJECTION, SOLUTION INFILTRATION; PERINEURAL PRN
Status: DISCONTINUED | OUTPATIENT
Start: 2020-04-29 | End: 2020-04-29

## 2020-04-29 RX ORDER — OXYCODONE HYDROCHLORIDE 5 MG/1
5-10 TABLET ORAL EVERY 4 HOURS PRN
Status: DISCONTINUED | OUTPATIENT
Start: 2020-04-29 | End: 2020-04-30 | Stop reason: HOSPADM

## 2020-04-29 RX ORDER — ATENOLOL 100 MG/1
100 TABLET ORAL DAILY
Status: DISCONTINUED | OUTPATIENT
Start: 2020-04-30 | End: 2020-04-30 | Stop reason: HOSPADM

## 2020-04-29 RX ORDER — DEXAMETHASONE SODIUM PHOSPHATE 4 MG/ML
INJECTION, SOLUTION INTRA-ARTICULAR; INTRALESIONAL; INTRAMUSCULAR; INTRAVENOUS; SOFT TISSUE PRN
Status: DISCONTINUED | OUTPATIENT
Start: 2020-04-29 | End: 2020-04-29

## 2020-04-29 RX ORDER — CIPROFLOXACIN 500 MG/1
500 TABLET, FILM COATED ORAL ONCE
Qty: 1 TABLET | Refills: 0 | Status: SHIPPED | OUTPATIENT
Start: 2020-04-29 | End: 2020-04-29

## 2020-04-29 RX ORDER — ONDANSETRON 2 MG/ML
4 INJECTION INTRAMUSCULAR; INTRAVENOUS EVERY 30 MIN PRN
Status: DISCONTINUED | OUTPATIENT
Start: 2020-04-29 | End: 2020-04-29 | Stop reason: HOSPADM

## 2020-04-29 RX ORDER — AMOXICILLIN 250 MG
1 CAPSULE ORAL 2 TIMES DAILY PRN
Qty: 20 TABLET | Refills: 0 | Status: SHIPPED | OUTPATIENT
Start: 2020-04-29 | End: 2020-09-03

## 2020-04-29 RX ORDER — NALOXONE HYDROCHLORIDE 0.4 MG/ML
.1-.4 INJECTION, SOLUTION INTRAMUSCULAR; INTRAVENOUS; SUBCUTANEOUS
Status: DISCONTINUED | OUTPATIENT
Start: 2020-04-29 | End: 2020-04-30 | Stop reason: HOSPADM

## 2020-04-29 RX ORDER — PROPOFOL 10 MG/ML
INJECTION, EMULSION INTRAVENOUS PRN
Status: DISCONTINUED | OUTPATIENT
Start: 2020-04-29 | End: 2020-04-29

## 2020-04-29 RX ORDER — KETOROLAC TROMETHAMINE 30 MG/ML
30 INJECTION, SOLUTION INTRAMUSCULAR; INTRAVENOUS EVERY 6 HOURS
Status: DISCONTINUED | OUTPATIENT
Start: 2020-04-29 | End: 2020-04-30 | Stop reason: HOSPADM

## 2020-04-29 RX ORDER — DEXAMETHASONE SODIUM PHOSPHATE 4 MG/ML
4 INJECTION, SOLUTION INTRA-ARTICULAR; INTRALESIONAL; INTRAMUSCULAR; INTRAVENOUS; SOFT TISSUE
Status: DISCONTINUED | OUTPATIENT
Start: 2020-04-29 | End: 2020-04-29 | Stop reason: HOSPADM

## 2020-04-29 RX ORDER — GLYCOPYRROLATE 0.2 MG/ML
INJECTION, SOLUTION INTRAMUSCULAR; INTRAVENOUS PRN
Status: DISCONTINUED | OUTPATIENT
Start: 2020-04-29 | End: 2020-04-29

## 2020-04-29 RX ORDER — LIDOCAINE 40 MG/G
CREAM TOPICAL
Status: DISCONTINUED | OUTPATIENT
Start: 2020-04-29 | End: 2020-04-30 | Stop reason: HOSPADM

## 2020-04-29 RX ORDER — AMLODIPINE BESYLATE 5 MG/1
10 TABLET ORAL DAILY
Status: DISCONTINUED | OUTPATIENT
Start: 2020-04-30 | End: 2020-04-30 | Stop reason: HOSPADM

## 2020-04-29 RX ORDER — NEOMYCIN/BACITRACIN/POLYMYXINB 3.5-400-5K
OINTMENT (GRAM) TOPICAL 4 TIMES DAILY PRN
Status: DISCONTINUED | OUTPATIENT
Start: 2020-04-29 | End: 2020-04-30 | Stop reason: HOSPADM

## 2020-04-29 RX ORDER — FENTANYL CITRATE 50 UG/ML
INJECTION, SOLUTION INTRAMUSCULAR; INTRAVENOUS PRN
Status: DISCONTINUED | OUTPATIENT
Start: 2020-04-29 | End: 2020-04-29

## 2020-04-29 RX ORDER — ONDANSETRON 2 MG/ML
4 INJECTION INTRAMUSCULAR; INTRAVENOUS EVERY 6 HOURS PRN
Status: DISCONTINUED | OUTPATIENT
Start: 2020-04-29 | End: 2020-04-30 | Stop reason: HOSPADM

## 2020-04-29 RX ORDER — CEFAZOLIN SODIUM 2 G/100ML
2 INJECTION, SOLUTION INTRAVENOUS
Status: COMPLETED | OUTPATIENT
Start: 2020-04-29 | End: 2020-04-29

## 2020-04-29 RX ORDER — OXYCODONE HYDROCHLORIDE 5 MG/1
5-10 TABLET ORAL EVERY 4 HOURS PRN
Qty: 15 TABLET | Refills: 0 | Status: SHIPPED | OUTPATIENT
Start: 2020-04-29 | End: 2020-09-03

## 2020-04-29 RX ORDER — PROPOFOL 10 MG/ML
INJECTION, EMULSION INTRAVENOUS CONTINUOUS PRN
Status: DISCONTINUED | OUTPATIENT
Start: 2020-04-29 | End: 2020-04-29

## 2020-04-29 RX ORDER — AMOXICILLIN 250 MG
1 CAPSULE ORAL 2 TIMES DAILY
Status: DISCONTINUED | OUTPATIENT
Start: 2020-04-29 | End: 2020-04-29

## 2020-04-29 RX ORDER — ONDANSETRON 2 MG/ML
INJECTION INTRAMUSCULAR; INTRAVENOUS PRN
Status: DISCONTINUED | OUTPATIENT
Start: 2020-04-29 | End: 2020-04-29

## 2020-04-29 RX ORDER — FENTANYL CITRATE 50 UG/ML
25-50 INJECTION, SOLUTION INTRAMUSCULAR; INTRAVENOUS
Status: DISCONTINUED | OUTPATIENT
Start: 2020-04-29 | End: 2020-04-29 | Stop reason: HOSPADM

## 2020-04-29 RX ORDER — HEPARIN SODIUM 5000 [USP'U]/.5ML
5000 INJECTION, SOLUTION INTRAVENOUS; SUBCUTANEOUS
Status: COMPLETED | OUTPATIENT
Start: 2020-04-29 | End: 2020-04-29

## 2020-04-29 RX ORDER — EPHEDRINE SULFATE 50 MG/ML
INJECTION, SOLUTION INTRAMUSCULAR; INTRAVENOUS; SUBCUTANEOUS PRN
Status: DISCONTINUED | OUTPATIENT
Start: 2020-04-29 | End: 2020-04-29

## 2020-04-29 RX ORDER — HYDROMORPHONE HYDROCHLORIDE 1 MG/ML
0.3 INJECTION, SOLUTION INTRAMUSCULAR; INTRAVENOUS; SUBCUTANEOUS
Status: DISCONTINUED | OUTPATIENT
Start: 2020-04-29 | End: 2020-04-30 | Stop reason: HOSPADM

## 2020-04-29 RX ORDER — SODIUM CHLORIDE 9 MG/ML
INJECTION, SOLUTION INTRAVENOUS CONTINUOUS
Status: DISCONTINUED | OUTPATIENT
Start: 2020-04-29 | End: 2020-04-30

## 2020-04-29 RX ORDER — NALOXONE HYDROCHLORIDE 0.4 MG/ML
.1-.4 INJECTION, SOLUTION INTRAMUSCULAR; INTRAVENOUS; SUBCUTANEOUS
Status: DISCONTINUED | OUTPATIENT
Start: 2020-04-29 | End: 2020-04-29

## 2020-04-29 RX ORDER — ASPIRIN 81 MG/1
81 TABLET ORAL DAILY
Status: DISCONTINUED | OUTPATIENT
Start: 2020-04-30 | End: 2020-04-30 | Stop reason: HOSPADM

## 2020-04-29 RX ORDER — HYDROMORPHONE HYDROCHLORIDE 1 MG/ML
.3-.5 INJECTION, SOLUTION INTRAMUSCULAR; INTRAVENOUS; SUBCUTANEOUS EVERY 5 MIN PRN
Status: DISCONTINUED | OUTPATIENT
Start: 2020-04-29 | End: 2020-04-29 | Stop reason: HOSPADM

## 2020-04-29 RX ORDER — BUPIVACAINE HYDROCHLORIDE 2.5 MG/ML
INJECTION, SOLUTION INFILTRATION; PERINEURAL PRN
Status: DISCONTINUED | OUTPATIENT
Start: 2020-04-29 | End: 2020-04-29 | Stop reason: HOSPADM

## 2020-04-29 RX ADMIN — ROCURONIUM BROMIDE 10 MG: 10 INJECTION INTRAVENOUS at 09:41

## 2020-04-29 RX ADMIN — SODIUM CHLORIDE, POTASSIUM CHLORIDE, SODIUM LACTATE AND CALCIUM CHLORIDE: 600; 310; 30; 20 INJECTION, SOLUTION INTRAVENOUS at 10:25

## 2020-04-29 RX ADMIN — ROCURONIUM BROMIDE 20 MG: 10 INJECTION INTRAVENOUS at 09:05

## 2020-04-29 RX ADMIN — HYDROMORPHONE HYDROCHLORIDE 0.5 MG: 1 INJECTION, SOLUTION INTRAMUSCULAR; INTRAVENOUS; SUBCUTANEOUS at 09:49

## 2020-04-29 RX ADMIN — GLYCOPYRROLATE 0.6 MG: 0.2 INJECTION, SOLUTION INTRAMUSCULAR; INTRAVENOUS at 12:00

## 2020-04-29 RX ADMIN — ACETAMINOPHEN 650 MG: 325 TABLET, FILM COATED ORAL at 21:26

## 2020-04-29 RX ADMIN — PROPOFOL 50 MG: 10 INJECTION, EMULSION INTRAVENOUS at 11:49

## 2020-04-29 RX ADMIN — SODIUM CHLORIDE, POTASSIUM CHLORIDE, SODIUM LACTATE AND CALCIUM CHLORIDE: 600; 310; 30; 20 INJECTION, SOLUTION INTRAVENOUS at 06:42

## 2020-04-29 RX ADMIN — ROCURONIUM BROMIDE 10 MG: 10 INJECTION INTRAVENOUS at 10:38

## 2020-04-29 RX ADMIN — DEXAMETHASONE SODIUM PHOSPHATE 4 MG: 4 INJECTION, SOLUTION INTRA-ARTICULAR; INTRALESIONAL; INTRAMUSCULAR; INTRAVENOUS; SOFT TISSUE at 07:41

## 2020-04-29 RX ADMIN — HYDROMORPHONE HYDROCHLORIDE 0.5 MG: 1 INJECTION, SOLUTION INTRAMUSCULAR; INTRAVENOUS; SUBCUTANEOUS at 08:37

## 2020-04-29 RX ADMIN — Medication 5 MG: at 10:25

## 2020-04-29 RX ADMIN — FENTANYL CITRATE 50 MCG: 50 INJECTION, SOLUTION INTRAMUSCULAR; INTRAVENOUS at 07:41

## 2020-04-29 RX ADMIN — PROPOFOL 200 MG: 10 INJECTION, EMULSION INTRAVENOUS at 07:41

## 2020-04-29 RX ADMIN — MIDAZOLAM 2 MG: 1 INJECTION INTRAMUSCULAR; INTRAVENOUS at 07:36

## 2020-04-29 RX ADMIN — ROCURONIUM BROMIDE 50 MG: 10 INJECTION INTRAVENOUS at 07:41

## 2020-04-29 RX ADMIN — ONDANSETRON 4 MG: 2 INJECTION INTRAMUSCULAR; INTRAVENOUS at 11:43

## 2020-04-29 RX ADMIN — NEOSTIGMINE METHYLSULFATE 4.5 MG: 1 INJECTION, SOLUTION INTRAVENOUS at 12:00

## 2020-04-29 RX ADMIN — SENNOSIDES AND DOCUSATE SODIUM 1 TABLET: 8.6; 5 TABLET ORAL at 21:26

## 2020-04-29 RX ADMIN — Medication 5 MG: at 08:01

## 2020-04-29 RX ADMIN — KETOROLAC TROMETHAMINE 30 MG: 30 INJECTION, SOLUTION INTRAMUSCULAR at 17:46

## 2020-04-29 RX ADMIN — ROCURONIUM BROMIDE 10 MG: 10 INJECTION INTRAVENOUS at 10:17

## 2020-04-29 RX ADMIN — Medication 5 MG: at 10:07

## 2020-04-29 RX ADMIN — ACETAMINOPHEN 650 MG: 325 TABLET, FILM COATED ORAL at 17:46

## 2020-04-29 RX ADMIN — HEPARIN SODIUM 5000 UNITS: 10000 INJECTION, SOLUTION INTRAVENOUS; SUBCUTANEOUS at 07:50

## 2020-04-29 RX ADMIN — Medication 5 MG: at 08:16

## 2020-04-29 RX ADMIN — CEFAZOLIN SODIUM 2 G: 2 INJECTION, SOLUTION INTRAVENOUS at 08:04

## 2020-04-29 RX ADMIN — ROCURONIUM BROMIDE 10 MG: 10 INJECTION INTRAVENOUS at 11:35

## 2020-04-29 RX ADMIN — SODIUM CHLORIDE: 9 INJECTION, SOLUTION INTRAVENOUS at 14:44

## 2020-04-29 RX ADMIN — Medication 5 MG: at 08:07

## 2020-04-29 RX ADMIN — CEFAZOLIN SODIUM 1 G: 2 INJECTION, SOLUTION INTRAVENOUS at 10:03

## 2020-04-29 RX ADMIN — CEFAZOLIN SODIUM 1 G: 2 INJECTION, SOLUTION INTRAVENOUS at 12:05

## 2020-04-29 RX ADMIN — PROPOFOL 30 MCG/KG/MIN: 10 INJECTION, EMULSION INTRAVENOUS at 07:43

## 2020-04-29 RX ADMIN — LIDOCAINE HYDROCHLORIDE 100 MG: 20 INJECTION, SOLUTION INFILTRATION; PERINEURAL at 07:41

## 2020-04-29 RX ADMIN — KETOROLAC TROMETHAMINE 15 MG: 30 INJECTION, SOLUTION INTRAMUSCULAR at 11:56

## 2020-04-29 RX ADMIN — KETOROLAC TROMETHAMINE 30 MG: 30 INJECTION, SOLUTION INTRAMUSCULAR at 23:29

## 2020-04-29 RX ADMIN — FENTANYL CITRATE 50 MCG: 50 INJECTION, SOLUTION INTRAMUSCULAR; INTRAVENOUS at 08:20

## 2020-04-29 RX ADMIN — SODIUM CHLORIDE: 9 INJECTION, SOLUTION INTRAVENOUS at 21:33

## 2020-04-29 RX ADMIN — ROCURONIUM BROMIDE 20 MG: 10 INJECTION INTRAVENOUS at 08:24

## 2020-04-29 ASSESSMENT — COPD QUESTIONNAIRES: COPD: 0

## 2020-04-29 ASSESSMENT — LIFESTYLE VARIABLES: TOBACCO_USE: 0

## 2020-04-29 ASSESSMENT — MIFFLIN-ST. JEOR: SCORE: 1723.41

## 2020-04-29 NOTE — ANESTHESIA PREPROCEDURE EVALUATION
Anesthesia Pre-Procedure Evaluation    Patient: Dion Bowman   MRN: [id number] : 1955          Preoperative Diagnosis: Prostate cancer (H) [C61]    Procedure(s):  ROBOTIC ASSISTED PROSTATECTOMY, WITH PELVIC LYMPHADENECTOMY    Past Medical History:   Diagnosis Date     Cancer (H)     prostate     CARDIOVASCULAR SCREENING; LDL GOAL LESS THAN 130 2010     Hypertension goal BP (blood pressure) < 140/90 2010     Impaired fasting blood sugar 2010     Past Surgical History:   Procedure Laterality Date     ABDOMEN SURGERY      hernia     HC COLONOSCOPY THRU STOMA, DIAGNOSTIC  3/09/2009    Negative - Due in 2019     HERNIA REPAIR, INGUINAL RT/LT  2006    Left Inguinal Hernia       Anesthesia Evaluation     . Pt has had prior anesthetic. Type: General    No history of anesthetic complications          ROS/MED HX    ENT/Pulmonary:      (-) tobacco use, asthma and COPD   Neurologic:      (-) CVA, TIA and Neuropathy   Cardiovascular:     (+) hypertension----. : . . . :. .      (-) CAD, irregular heartbeat/palpitations and stent   METS/Exercise Tolerance:     Hematologic:        (-) anemia   Musculoskeletal:         GI/Hepatic:        (-) GERD and liver disease   Renal/Genitourinary:      (-) renal disease   Endo:      (-) Type I DM, Type II DM and thyroid disease   Psychiatric:         Infectious Disease:  - neg infectious disease ROS       Malignancy:   (+) Malignancy History of Prostate          Other:                          Physical Exam  Normal systems: cardiovascular, pulmonary and dental    Airway   Mallampati: II  TM distance: >3 FB  Neck ROM: full    Dental     Cardiovascular       Pulmonary             Lab Results   Component Value Date    WBC 9.3 2020    HGB 15.0 2020    HCT 42.5 2020     2020     (L) 2020    POTASSIUM 3.8 2020    CHLORIDE 98 2020    CO2 27 2020    BUN 14 2020    CR 0.89 2020     (H)  04/27/2020    WADE 9.1 04/27/2020    ALBUMIN 4.0 04/27/2020    PROTTOTAL 8.1 04/27/2020    ALT 23 04/27/2020    AST 18 04/27/2020    ALKPHOS 78 04/27/2020    BILITOTAL 0.7 04/27/2020    TSH 1.27 12/24/2012       Preop Vitals  BP Readings from Last 3 Encounters:   04/29/20 (!) 143/87   04/27/20 138/88   03/20/20 (!) 146/82    Pulse Readings from Last 3 Encounters:   04/29/20 69   04/27/20 88   03/20/20 86      Resp Readings from Last 3 Encounters:   04/29/20 16   04/27/20 16   10/14/19 16    SpO2 Readings from Last 3 Encounters:   04/29/20 97%   04/27/20 97%   03/20/20 98%      Temp Readings from Last 1 Encounters:   04/29/20 36.9  C (98.5  F) (Temporal)    Ht Readings from Last 1 Encounters:   04/29/20 1.829 m (6')      Wt Readings from Last 1 Encounters:   04/29/20 89 kg (196 lb 4.8 oz)    Estimated body mass index is 26.62 kg/m  as calculated from the following:    Height as of this encounter: 1.829 m (6').    Weight as of this encounter: 89 kg (196 lb 4.8 oz).       Anesthesia Plan      History & Physical Review  History and physical reviewed and following examination; no interval change.    ASA Status:  2 .    NPO Status:  > 6 hours    Plan for General and ETT with Intravenous induction. Maintenance will be Balanced.    PONV prophylaxis:  Ondansetron (or other 5HT-3) and Dexamethasone or Solumedrol    Background propofol infusion        Postoperative Care  Postoperative pain management:  Oral pain medications and IV analgesics.      Consents  Anesthetic plan, risks, benefits and alternatives discussed with:  Patient..                 Alexander Hendrix MD

## 2020-04-29 NOTE — OP NOTE
OPERATIVE REPORT  DATE OF PROCEDURE: 4/29/2020  PRE-PROCEDURE DIAGNOSIS: Prostate cancer.   POST-PROCEDURE DIAGNOSIS: Prostate cancer.   PROCEDURES PERFORMED:   1) Robotic-assisted laparoscopic radical prostatectomy  2) Bilateral pelvic lymphadenectomy  SURGEON: Brady Flannery MD - Present and scrubbed for the entire procedure  ASSISTANT: Alexandro Xavier MD  SECOND ASSISTANT: Leah Rai  ANESTHESIA: General with endotracheal intubation.   INDICATIONS FOR PROCEDURE: Dion Bowman is a 64 year-old gentleman with a history of Deb 4+4 = 8 adenocarcinoma of the prostate. He has been counseled regarding treatment options and presents to undergo surgery.   Findings: Prostate and seminal vesicles removed without complication. Lymph nodes grossly negative. Water-tight anastomosis.  Specimens:  ID Type Source Tests Collected by Time Destination   A : PROSTATE AND SEMINAL VESICLES Organ Prostate SURGICAL PATHOLOGY EXAM Brady Flannery MD 4/29/2020 11:53 AM    B : BILATERAL PELVIC LYMPH NODES Tissue Lymph Node SURGICAL PATHOLOGY EXAM Brady Flannery MD 4/29/2020 11:53 AM      DESCRIPTION OF PROCEDURE: After fully informed voluntary consent was obtained, the patient was brought into the operating room, properly identified and placed in a supine position on the table. General anesthesia was induced, endotracheal intubation performed, IV Ancef administered. The patient was then positioned appropriately on the table and all pressure points were padded and he was secured to the table with tape. Once the positioning was performed, we proceeded with shaving, prepping and draping the abdomen in the usual sterile fashion. Matthews was placed in the bladder in a sterile manner on the field. A transverse midline incision about 2 cm above the umbilicus was made and a Veress needle introduced into the abdomen through this incision. Once the abdomen was accessed, it was confirmed using a saline drop test and  the abdomen was insufflated. Once the abdomen was insufflated, additional robotic ports, 2 on the left and 1 on the right, were placed. A 12mm right-sided assistant port lateral to the robotic ports. Robot was then docked and surgery was commenced.   The sigmoid was adherent to the lateral pelvic sidewall and these adhesions were taken down sharply. A transverse incision was made posterior to the seminal vesicles. Denonvilliers' fascia was incised the rectum was mobilized way from the posterior prostate. The vas deferens on both sides was identified and divided with electrocautery and the seminal vesicles were carefully dissected out with the vasculature being controlled using Lapro clips. Once seminal vesicle and vas deferens were dissected all the way down to the prostate, attention was turned to bringing the bladder down off the anterior wall of the abdomen. Incisions were made lateral the medial umbilical ligaments and carried all the way up to the umbilicus and the urachus was divided horizontally. The bladder was mobilized away from the anterior abdominal wall and the the pelvic sidewalls on both sides. Endopelvic fascia was opened on each side and lateral prostate was dissected away from the levator musculature. An 0-Vicryl suture was placed to ligate the dorsal venous complex.  The anterior bladder neck was then incised at the base of the prostate and dissection was carried through the anterior urethra to identify the Matthews catheter. The Matthews was then pulled out through this opening in the urethra to give upward traction on the prostate. Posterior bladder neck was then divided. There was no median lobe. Attention was then turned to the prostatic pedicles. A partial-nerve-sparing procedure was performed based on high-risk disease and MRI findings suspicious for extracapsular disease. The prostatic pedicles were then divided using clips on both sides all the way to the apex. The dorsal venous complex was then  divided with cautery. The anterior urethra and apex of prostate were dissected free. Urethra was divided and prostate was free. It was placed in an endocatch bag.  Prostatic fossa was carefully inspected for hemostasis. Pelvic lymph node dissection was performed removing all gemma tissue between the external iliac vein, the pelvic floor, inguinal ligament up to the bifurcation of the common iliac vessels on both sides. Vesicourethral anastomosis was then performed in a standard fashion using two 3-0 V-lock sutures, the tail ends of which had been tied together. The first suture was taken at the 5 o'clock position through the bladder neck and taken through the posterior urethral plate and up the left side of the urethra and bladder to the 11 o'clock position. We then brought the right sided anastomotic suture through the urethra and up the right side of the urethra and bladder to the midline. The sutures were then tied down. A final Matthews catheter was then placed and the bladder irrigated.  The anastomosis was tested with saline and found it to be leak free. An EndoCatch bag was used to retrieve the specimen and a 19 Apolinar drain was placed in the pelvis. Robot was undocked and ports removed. Midline camera port incision was enlarged in a transverse fashion and the specimen retrieved. The fascia was reapproximated using interrupted figure-of-eight 0 Vicryl sutures on UR-6 needle. The skin of all the incisions was closed using running subcuticular closure with 4-0 Monocryl. Skin glue was placed.      The patient tolerated the procedure well. There were no complications. Blood loss was 150 mL. All sponge, needle and instrument counts were correct and doubly verified prior to closure. I was present and involved in the entire procedure.      Brady Flannery MD  Urology  [facility]

## 2020-04-29 NOTE — ANESTHESIA CARE TRANSFER NOTE
Patient: Dion MONTERO Bowman    Procedure(s):  ROBOTIC ASSISTED PROSTATECTOMY, WITH BILATERAL PELVIC LYMPHADENECTOMY    Diagnosis: Prostate cancer (H) [C61]  Diagnosis Additional Information: No value filed.    Anesthesia Type:   General, ETT     Note:  Airway :Room Air  Patient transferred to:PACU  Comments: Neuromuscular blockade reversed after TOF 4/4, spontaneous respirations, adequate tidal volumes, followed commands to voice, oropharynx suctioned with soft flexible catheter, extubated atraumatically, extubated with suction, airway patent after extubation.  Oxygen via room air at room air to PACU. Oxygen tubing connected to wall O2 in PACU, SpO2, NiBP, and EKG monitors and alarms on and functioning, report on patient's clinical status given to PACU RN, RN questions answered.   Handoff Report: Identifed the Patient, Identified the Reponsible Provider, Reviewed the pertinent medical history, Discussed the surgical course, Reviewed Intra-OP anesthesia mangement and issues during anesthesia, Set expectations for post-procedure period and Allowed opportunity for questions and acknowledgement of understanding      Vitals: (Last set prior to Anesthesia Care Transfer)    CRNA VITALS  4/29/2020 1205 - 4/29/2020 1240      4/29/2020             Resp Rate (set):  10                Electronically Signed By: KARAN Oliva CRNA  April 29, 2020  12:40 PM

## 2020-04-29 NOTE — PLAN OF CARE
Pt A/Ox4.  Vitally stable.  Up with minimal SBA for first walk (pt very stable and able to be independent).  Is encouraged.  Denies pain, scheduled tylenol and toradol given.  Hypoactive bowel sounds, no flatus.  Tolerating full liquids.  Continue to monitor.

## 2020-04-29 NOTE — BRIEF OP NOTE
[facility]    Brief Operative Note    Pre-operative diagnosis: Prostate cancer (H) [C61]  Post-operative diagnosis Same as pre-operative diagnosis    Procedure: Procedure(s):  ROBOTIC ASSISTED PROSTATECTOMY, WITH BILATERAL PELVIC LYMPHADENECTOMY  Surgeon: Surgeon(s) and Role:     * Brady Flannery MD - Primary        Alexandro Xavier MD - Resident  Anesthesia: General   Estimated blood loss: Less than 50 ml  Drains: Matthews catheter; HEAVEN drain   Specimens:   ID Type Source Tests Collected by Time Destination   A : PROSTATE AND SEMINAL VESICLES Organ Prostate SURGICAL PATHOLOGY EXAM Brady Flannery MD 4/29/2020 11:53 AM    B : BILATERAL PELVIC LYMPH NODES Tissue Lymph Node SURGICAL PATHOLOGY EXAM Brady Flannery MD 4/29/2020 11:53 AM      Findings:   Unremarkable RALP with bilateral lymph nodes  Complications: None.  Implants: * No implants in log *

## 2020-04-29 NOTE — PROGRESS NOTES
"Nursing Note:  Pt arrived from PACU at approx 1430. Pt settled in bed. LR IV fluids stopped and NS started. CAPNO connected and results are WNL on RA. States has \"mild tolerable discomfort\" of lap sites rate 3 on 1-10 scale, refused pain meds. Abdomen soft with 4 lap sites open to air and surgically glued. Has HEAVEN to bulb suction. Next shift updated. Continue to monitor    Addendum: Matthews catheter draining clear yellow urine.  "

## 2020-04-29 NOTE — ANESTHESIA POSTPROCEDURE EVALUATION
Patient: Dion MONTERO Bowman    Procedure(s):  ROBOTIC ASSISTED PROSTATECTOMY, WITH BILATERAL PELVIC LYMPHADENECTOMY    Diagnosis:Prostate cancer (H) [C61]  Diagnosis Additional Information: No value filed.    Anesthesia Type:  General, ETT    Note:  Anesthesia Post Evaluation    Patient location during evaluation: PACU  Patient participation: Able to fully participate in evaluation  Level of consciousness: awake and alert  Pain management: adequate  Airway patency: patent  Cardiovascular status: acceptable  Respiratory status: acceptable  Hydration status: acceptable  PONV: none     Anesthetic complications: None          Last vitals:  Vitals:    04/29/20 1431 04/29/20 1511 04/29/20 1640   BP: (!) 150/76 137/73 (!) 140/81   Pulse:   87   Resp:  10    Temp: 36.2  C (97.2  F) 36.6  C (97.8  F) 35.7  C (96.2  F)   SpO2: 94% 96% 95%         Electronically Signed By: Alexander Hendrix MD  April 29, 2020  6:02 PM

## 2020-04-30 VITALS
HEART RATE: 69 BPM | WEIGHT: 196.3 LBS | RESPIRATION RATE: 16 BRPM | TEMPERATURE: 98 F | OXYGEN SATURATION: 96 % | DIASTOLIC BLOOD PRESSURE: 75 MMHG | SYSTOLIC BLOOD PRESSURE: 140 MMHG | HEIGHT: 72 IN | BODY MASS INDEX: 26.59 KG/M2

## 2020-04-30 LAB
ANION GAP SERPL CALCULATED.3IONS-SCNC: 7 MMOL/L (ref 3–14)
BUN SERPL-MCNC: 20 MG/DL (ref 7–30)
CALCIUM SERPL-MCNC: 8.5 MG/DL (ref 8.5–10.1)
CHLORIDE SERPL-SCNC: 106 MMOL/L (ref 94–109)
CO2 SERPL-SCNC: 25 MMOL/L (ref 20–32)
COPATH REPORT: NORMAL
CREAT FLD-MCNC: 1 MG/DL
CREAT SERPL-MCNC: 1.01 MG/DL (ref 0.66–1.25)
ERYTHROCYTE [DISTWIDTH] IN BLOOD BY AUTOMATED COUNT: 12.7 % (ref 10–15)
GFR SERPL CREATININE-BSD FRML MDRD: 78 ML/MIN/{1.73_M2}
GLUCOSE SERPL-MCNC: 141 MG/DL (ref 70–99)
HCT VFR BLD AUTO: 36.1 % (ref 40–53)
HGB BLD-MCNC: 12.5 G/DL (ref 13.3–17.7)
MCH RBC QN AUTO: 32.2 PG (ref 26.5–33)
MCHC RBC AUTO-ENTMCNC: 34.6 G/DL (ref 31.5–36.5)
MCV RBC AUTO: 93 FL (ref 78–100)
PLATELET # BLD AUTO: 264 10E9/L (ref 150–450)
POTASSIUM SERPL-SCNC: 4.1 MMOL/L (ref 3.4–5.3)
RBC # BLD AUTO: 3.88 10E12/L (ref 4.4–5.9)
SODIUM SERPL-SCNC: 138 MMOL/L (ref 133–144)
SPECIMEN SOURCE FLD: NORMAL
WBC # BLD AUTO: 10.1 10E9/L (ref 4–11)

## 2020-04-30 PROCEDURE — 85027 COMPLETE CBC AUTOMATED: CPT | Performed by: STUDENT IN AN ORGANIZED HEALTH CARE EDUCATION/TRAINING PROGRAM

## 2020-04-30 PROCEDURE — 25000132 ZZH RX MED GY IP 250 OP 250 PS 637: Performed by: UROLOGY

## 2020-04-30 PROCEDURE — 25800030 ZZH RX IP 258 OP 636: Performed by: STUDENT IN AN ORGANIZED HEALTH CARE EDUCATION/TRAINING PROGRAM

## 2020-04-30 PROCEDURE — 80048 BASIC METABOLIC PNL TOTAL CA: CPT | Performed by: STUDENT IN AN ORGANIZED HEALTH CARE EDUCATION/TRAINING PROGRAM

## 2020-04-30 PROCEDURE — 25000128 H RX IP 250 OP 636: Performed by: STUDENT IN AN ORGANIZED HEALTH CARE EDUCATION/TRAINING PROGRAM

## 2020-04-30 PROCEDURE — 25000132 ZZH RX MED GY IP 250 OP 250 PS 637: Performed by: STUDENT IN AN ORGANIZED HEALTH CARE EDUCATION/TRAINING PROGRAM

## 2020-04-30 PROCEDURE — 36415 COLL VENOUS BLD VENIPUNCTURE: CPT | Performed by: STUDENT IN AN ORGANIZED HEALTH CARE EDUCATION/TRAINING PROGRAM

## 2020-04-30 PROCEDURE — 82570 ASSAY OF URINE CREATININE: CPT | Performed by: STUDENT IN AN ORGANIZED HEALTH CARE EDUCATION/TRAINING PROGRAM

## 2020-04-30 RX ADMIN — ATENOLOL 100 MG: 100 TABLET ORAL at 10:26

## 2020-04-30 RX ADMIN — ACETAMINOPHEN 650 MG: 325 TABLET, FILM COATED ORAL at 10:19

## 2020-04-30 RX ADMIN — SENNOSIDES AND DOCUSATE SODIUM 1 TABLET: 8.6; 5 TABLET ORAL at 10:32

## 2020-04-30 RX ADMIN — ACETAMINOPHEN 650 MG: 325 TABLET, FILM COATED ORAL at 06:06

## 2020-04-30 RX ADMIN — KETOROLAC TROMETHAMINE 30 MG: 30 INJECTION, SOLUTION INTRAMUSCULAR at 06:06

## 2020-04-30 RX ADMIN — SODIUM CHLORIDE: 9 INJECTION, SOLUTION INTRAVENOUS at 07:33

## 2020-04-30 RX ADMIN — AMLODIPINE BESYLATE 10 MG: 5 TABLET ORAL at 10:20

## 2020-04-30 RX ADMIN — ASPIRIN 81 MG: 81 TABLET, DELAYED RELEASE ORAL at 10:26

## 2020-04-30 NOTE — PLAN OF CARE
POD1 prostatectomy. VSS on RA. Pain controlled with scheduled tylenol and Toradol. BS active, passing gas. Full liquid diet, denies nausea. 4 lap sites VANCE with surgical glue. Matthews patent with brown tinged urine. HEAVEN, dressing changed. Ambulated again in the evening, SBA. Probable discharge home today.

## 2020-04-30 NOTE — PROGRESS NOTES
Urology  Progress Note    No acute events overnight  Pain well controlled  Tolerating diet  Passing gas  Ambulating without difficulty     Exam  BP (!) 151/76 (BP Location: Left arm)   Pulse 69   Temp 97.9  F (36.6  C) (Oral)   Resp 16   Ht 1.829 m (6')   Wt 89 kg (196 lb 4.8 oz)   SpO2 96%   BMI 26.62 kg/m    No acute distress  Non-labored breathing  Abdomen soft, non-distended, appropriately tender, incisions secured with dermabond  HEAVEN serosanguinous  Chang clear    Chang 3200/24  HEAVEN drain 150/24  HEAVEN creatinine 1.0    Labs  WBC   Date Value Ref Range Status   04/30/2020 10.1 4.0 - 11.0 10e9/L Final   04/27/2020 9.3 4.0 - 11.0 10e9/L Final   01/20/2006 8.9 4.0 - 11.0 10e9/L Final      Hemoglobin   Date Value Ref Range Status   04/30/2020 12.5 (L) 13.3 - 17.7 g/dL Final   04/27/2020 15.0 13.3 - 17.7 g/dL Final   01/20/2006 16.3 13.3 - 17.7 g/dL Final      Platelet Count   Date Value Ref Range Status   04/30/2020 264 150 - 450 10e9/L Final      Creatinine   Date Value Ref Range Status   04/30/2020 1.01 0.66 - 1.25 mg/dL Final   04/27/2020 0.89 0.66 - 1.25 mg/dL Final   10/14/2019 0.92 0.66 - 1.25 mg/dL Final      Potassium   Date Value Ref Range Status   04/30/2020 4.1 3.4 - 5.3 mmol/L Final        Assessment/Plan  64 year old y/o male POD#1 s/p RALP.  Unremarkable postoperative course.  Likely discharge today    Neuro: Pain well controlled  CV: HDS - home meds ordered  Pulm: incentive spirometry while awake  FEN/GI: Regular diet - bowel regimen ordered  Endo: No acute issues  : Home with chang; HEAVEN out prior to discharge  Heme: No acute issues  ID: No acute issues  Activity: As tolerated  PPx: SCDs  Dispo:    Will discuss with Dr. Alma Delia Xavier MD, PGY-5  Urology Resident     Contacting the Urology Team     Please use the following job codes to reach the Urology Team. Note that you must use an in house phone and that job codes cannot receive text pages.     On weekdays, dial [phone] (or  star-star-star 777 on the new [facility] telephones) then [phone] to reach the Adult Urology resident or PA on call    On weekdays, dial [phone] (or star-star-star 777 on the new [facility] telephones) then [phone] to reach the Pediatric Urology resident    On weeknights and weekends, dial [phone] (or star-star-star 777 on the new [facility] telephones) then [phone] to reach the Urology resident on call (for both Adult and Pediatrics)

## 2020-04-30 NOTE — PLAN OF CARE
7a-3p shift  Patient discharged home with chang in place to leg bag at approx 1430. IV was discontinue by NA. Denied pain at time of discharge was {. Belongings returned to patient.  Discharge instructions and medications reviewed with patient. All chang teaching care done I with pt including change of bags bedside to leg and back. Bag of chang care supplies given to pt to take home.  Patient verbalized understanding and all questions were answered.  Rx meds filled here and taken home by patient.  At time of discharge, patient condition was stable and left the unit in w/c escorted by NA.  Transportation by family. Chang producing clear to very pale yellow returns.tolerating dfiet and activity. Passing flatus stable status

## 2020-05-07 ENCOUNTER — TELEPHONE (OUTPATIENT)
Dept: UROLOGY | Facility: CLINIC | Age: 65
End: 2020-05-07

## 2020-05-07 NOTE — TELEPHONE ENCOUNTER
Spoke with patient to do pre-visit covid-19 screening. Informed him to come to appointment wearing a mask, on time, and alone.

## 2020-05-08 ENCOUNTER — OFFICE VISIT (OUTPATIENT)
Dept: UROLOGY | Facility: CLINIC | Age: 65
End: 2020-05-08
Payer: COMMERCIAL

## 2020-05-08 VITALS
OXYGEN SATURATION: 97 % | HEIGHT: 72 IN | HEART RATE: 90 BPM | SYSTOLIC BLOOD PRESSURE: 130 MMHG | BODY MASS INDEX: 25.73 KG/M2 | WEIGHT: 190 LBS | DIASTOLIC BLOOD PRESSURE: 72 MMHG

## 2020-05-08 DIAGNOSIS — C61 PROSTATE CANCER (H): Primary | ICD-10-CM

## 2020-05-08 PROCEDURE — 99024 POSTOP FOLLOW-UP VISIT: CPT | Performed by: UROLOGY

## 2020-05-08 ASSESSMENT — MIFFLIN-ST. JEOR: SCORE: 1694.83

## 2020-05-08 ASSESSMENT — PAIN SCALES - GENERAL: PAINLEVEL: NO PAIN (0)

## 2020-05-08 NOTE — NURSING NOTE
Chief Complaint   Patient presents with     Clinic Care Coordination - Follow-up     Pt here for cath removal     Catheter removal documentation on 5/8/2020:    Dion Bowman presents to the clinic for catheter removal.  Reason for removal: post-op  Order has been verified. yes  Catheter successfully removed at 12:04 PM without immediate complication.  30 cc's of urine present in the catheter bag.  Urethral meatus is free of secretions and encrustation.  The patient is afebrile.  The patient tolerated the procedure and was instructed to follow up with their PCP or consultant as planned    Estelle Mills CMA

## 2020-05-08 NOTE — PROGRESS NOTES
CHIEF COMPLAINT   It was my pleasure to see Dion Bowman who is a 64 year old male for follow-up of Prostate Cancer.      HPI   Dion Bowman is a very pleasant 64 year old male who presents with a history of Prostate Cancer.  Had RALP completed 4/29/2020. Deb 4+3 disease, pT2. Doing well.    RALP 4/29/2020  FINAL DIAGNOSIS:   A: Prostate and bilateral seminal vesicles: Robotic assisted Radical   prostatectomy:   - Prostatic adenocarcinoma, mixed acinar and ductal types, [place] score   4+3 = 7, grade group 3, involving   approximately 20% of total prostatic volume   - Tumor is confined to the prostate   - Resection margins negative for carcinoma   - Benign bilateral seminal vesicles   - See synoptic report for details     B: Lymph nodes, bilateral pelvic: Dissection:   - Nine lymph nodes, negative for metastatic carcinoma (0/9)     PHYSICAL EXAM  Patient is a 64 year old  male   Vitals: Blood pressure 130/72, pulse 90, height 1.829 m (6'), weight 86.2 kg (190 lb), SpO2 97 %.  General Appearance Adult: Body mass index is 25.77 kg/m .  Alert, no acute distress, oriented  HENT: throat/mouth:normal, good dentition  Lungs: no respiratory distress, or pursed lip breathing  Heart: No obvious jugular venous distension present  Abdomen: soft, nontender, no organomegaly or masses; incisions healing well  Back: no CVAT  Musculoskeltal: extremities normal, no peripheral edema  Skin: no suspicious lesions or rashes  Neuro: Alert, oriented, speech and mentation normal  Psych: affect and mood normal  Gait: Normal  : Matthews with clear urine    ASSESSMENT and PLAN  64 year old male with stage pT2, [place] 4+3=7 prostate cancer, s/p RALP completed 4/29/2020. Negative margins. Recovering well. Matthews removed today. Discussed expectations for recovery and ongoing activity restrictions. We discussed his pathology in detail today. We discussed the importance of ongoing PSA surveillance and risk of recurrence.      - Pelvic floor PT  referral  - Follow-up 3 months with PSA    Brady Flannery MD  Urology  [facility]

## 2020-05-15 ENCOUNTER — THERAPY VISIT (OUTPATIENT)
Dept: PHYSICAL THERAPY | Facility: CLINIC | Age: 65
End: 2020-05-15
Attending: UROLOGY
Payer: COMMERCIAL

## 2020-05-15 DIAGNOSIS — M62.89 PELVIC FLOOR DYSFUNCTION: ICD-10-CM

## 2020-05-15 DIAGNOSIS — C61 PROSTATE CANCER (H): ICD-10-CM

## 2020-05-15 PROCEDURE — 97535 SELF CARE MNGMENT TRAINING: CPT | Mod: GP | Performed by: PHYSICAL THERAPIST

## 2020-05-15 PROCEDURE — 97112 NEUROMUSCULAR REEDUCATION: CPT | Mod: GP | Performed by: PHYSICAL THERAPIST

## 2020-05-15 PROCEDURE — 97161 PT EVAL LOW COMPLEX 20 MIN: CPT | Mod: GP | Performed by: PHYSICAL THERAPIST

## 2020-05-15 NOTE — PROGRESS NOTES
[facility] Initial Evaluation  Subjective:  The history is provided by the patient. No  was used.   Patient Health History  Dion Bowman being seen for Prostate surgery follow up.     Date of Onset: 5/8/20, date of order, 4/29/20 date of RALP prostatectomy.   Problem occurred: God only knows   Pain is reported as 0/10 on pain scale.  General health as reported by patient is good.  Pertinent medical history includes: cancer (hypertension).   Red flags:  None as reported by patient.  Medical allergies: none.   Surgeries include:  Cancer surgery.    Current medications:  High blood pressure medication.    Current occupation is Sales.   Primary job tasks include:  Computer work, driving and lifting/carrying.                  Therapist Generated HPI Evaluation  Problem details: Pt presents today s/p RALP prostatectomy performed on 4/29/20..         Type of problem:  Incontinence.    This is a new condition.  Condition occurred with:  After surgery.  Where condition occurred: for unknown reasons.  Patient reports pain:  N/a.    Pain is worse during the day.  Since onset symptoms are unchanged.  Exacerbated by: sit to stands, activity.  and relieved by nothing.      Barriers include:  None as reported by patient.      Urination   Do you feel sensation of your bladder filling? yes  How many pads are you using? 2 depends  Any other activities that cause leaking? Going from sitting to standing  Do you have triggers that make you feel you can't wait to go to the bathroom? Sensation in penis, going from sitting to standing  Do you leak on the way to the bathroom with a strong urge to void? yes  When you leak what is the amount? small  How long can you delay the need to urinate? Not long  How many times do you get up to urinate at night? 2x  How many times do you urinate during the day? ~4x  Can you stop the flow of urine when on the toilet? Have not tried  Is the volume of urine passed  usually: medium to large  Do you strain to pass urine? no  Do you have a slow or hesitant urinary stream? no  Do you have difficulty initiating the urine stream? no  How many bladder infections have you had in the last 12 months? 0  What is you fluid intake per day? Water (8oz) 6  Caffeine 1 cup unsweetened tea  Alcohol 0    Bowel Habits  Frequency of bowel movements? 2x a day  Consistency of stool? Soft formed  Do you ignore the urge to defecate? no  Do you strain to pass stool? no    Pelvic Pain  When do you have pelvic pain? no  Are you currently on any medication for pain or bladder control? no    Objective:    OBSERVATION: abdominal incisions healing nicely without erythema    ANORECTAL EXTERNAL ASSESSMENT:  Skin condition:normal  Bearing down/coughing:not tested      SEMG BIOFEEDBACK  Surface Electrode Placement:   Perianal: Levator ani     Abdominals: transverse abdominis  Baseline EMG PM: supine and sittin-2mV  Baseline EMG Abdominals:supine and sitting: ~2mV  Peak PFM contraction: 4-5mV  Endurance:3 seconds  Pt demonstrates difficulty isolating PFM without contraction of abdominals       Assessment/Plan:    Patient is a 64 year old male with pelvic complaints.    Patient has the following significant findings with corresponding treatment plan.                Diagnosis 1: pelvic floor dysfunction  Decreased strength - therapeutic exercise, therapeutic activities and home program  Impaired muscle performance - biofeedback, electric stimulation, neuro re-education and home program  Decreased function - therapeutic activities, home program and functional performance testing    Therapy Evaluation Codes:   Cumulative Therapy Evaluation is: Low complexity.    Previous and current functional limitations:  (See Goal Flow Sheet for this information)    Short term and Long term goals: (See Goal Flow Sheet for this information)     Communication ability:  Patient appears to be able to clearly communicate and  understand verbal and written communication and follow directions correctly.  Treatment Explanation - The following has been discussed with the patient:   RX ordered/plan of care  Anticipated outcomes  Possible risks and side effects  This patient would benefit from PT intervention to resume normal activities.   Rehab potential is good.    Frequency:  1 X week, once daily  Duration:  for 8 weeks  Discharge Plan:  Achieve all LTG.  Independent in home treatment program.  Reach maximal therapeutic benefit.    Please refer to the daily flowsheet for treatment today, total treatment time and time spent performing 1:1 timed codes.

## 2020-05-29 ENCOUNTER — THERAPY VISIT (OUTPATIENT)
Dept: PHYSICAL THERAPY | Facility: CLINIC | Age: 65
End: 2020-05-29
Payer: COMMERCIAL

## 2020-05-29 DIAGNOSIS — M62.89 PELVIC FLOOR DYSFUNCTION: ICD-10-CM

## 2020-05-29 PROCEDURE — 97535 SELF CARE MNGMENT TRAINING: CPT | Mod: GP | Performed by: PHYSICAL THERAPIST

## 2020-05-29 PROCEDURE — 97112 NEUROMUSCULAR REEDUCATION: CPT | Mod: GP | Performed by: PHYSICAL THERAPIST

## 2020-06-12 ENCOUNTER — THERAPY VISIT (OUTPATIENT)
Dept: PHYSICAL THERAPY | Facility: CLINIC | Age: 65
End: 2020-06-12
Payer: COMMERCIAL

## 2020-06-12 DIAGNOSIS — M62.89 PELVIC FLOOR DYSFUNCTION: ICD-10-CM

## 2020-06-12 PROCEDURE — 97112 NEUROMUSCULAR REEDUCATION: CPT | Mod: GP | Performed by: PHYSICAL THERAPIST

## 2020-07-02 ENCOUNTER — THERAPY VISIT (OUTPATIENT)
Dept: PHYSICAL THERAPY | Facility: CLINIC | Age: 65
End: 2020-07-02
Payer: COMMERCIAL

## 2020-07-02 DIAGNOSIS — M62.89 PELVIC FLOOR DYSFUNCTION: ICD-10-CM

## 2020-07-02 PROCEDURE — 97535 SELF CARE MNGMENT TRAINING: CPT | Mod: GP | Performed by: PHYSICAL THERAPIST

## 2020-07-02 NOTE — PROGRESS NOTES
DISCHARGE REPORT    Progress reporting period is from 5/15/20 to 7/2/20.       SUBJECTIVE   Subjective: The pt reports things have been going well. He notes leaking with standing up on bike to go up a hill. Pt pleased with his progress and would like to be discharged from physical therapy    Current pain level is 0/10  .      Initial Pain level: 0/10.   Changes in function:  Yes (See Goal flowsheet attached for changes in current functional level)  Adverse reaction to treatment or activity: None    OBJECTIVE  Changes noted in objective findings:  Yes,   Objective: nighttime void: 1x   pad usage: 1x a day   AUA: 7     ASSESSMENT/PLAN  Updated problem list and treatment plan: Diagnosis 1:  Pelvic floor dysfunction  Impaired muscle performance - biofeedback and neuro re-education  STG/LTGs have been met or progress has been made towards goals:  Yes (See Goal flow sheet completed today.)  Assessment of Progress: The patient's condition is improving.  Self Management Plans:  Patient is independent in a home treatment program.  Patient is independent in self management of symptoms.  I have re-evaluated this patient and find that the nature, scope, duration and intensity of the therapy is appropriate for the medical condition of the patient.  Dion continues to require the following intervention to meet STG and LTG's:  PT intervention is no longer required to meet STG/LTG.    Recommendations:  This patient is ready to be discharged from therapy and continue their home treatment program.    Please refer to the daily flowsheet for treatment today, total treatment time and time spent performing 1:1 timed codes.

## 2020-09-03 ENCOUNTER — TELEPHONE (OUTPATIENT)
Dept: ONCOLOGY | Facility: CLINIC | Age: 65
End: 2020-09-03

## 2020-09-03 DIAGNOSIS — C61 PROSTATE CANCER (H): Primary | ICD-10-CM

## 2020-09-04 ENCOUNTER — TELEPHONE (OUTPATIENT)
Dept: UROLOGY | Facility: CLINIC | Age: 65
End: 2020-09-04

## 2020-09-04 ENCOUNTER — OFFICE VISIT (OUTPATIENT)
Dept: UROLOGY | Facility: CLINIC | Age: 65
End: 2020-09-04
Payer: COMMERCIAL

## 2020-09-04 VITALS
BODY MASS INDEX: 27.09 KG/M2 | SYSTOLIC BLOOD PRESSURE: 130 MMHG | HEART RATE: 77 BPM | WEIGHT: 200 LBS | OXYGEN SATURATION: 98 % | DIASTOLIC BLOOD PRESSURE: 80 MMHG | HEIGHT: 72 IN

## 2020-09-04 DIAGNOSIS — N52.31 ERECTILE DYSFUNCTION AFTER RADICAL PROSTATECTOMY: ICD-10-CM

## 2020-09-04 DIAGNOSIS — C61 PROSTATE CANCER (H): Primary | ICD-10-CM

## 2020-09-04 LAB — PSA SERPL-MCNC: <0.04 NG/ML (ref 0–4)

## 2020-09-04 PROCEDURE — 84153 ASSAY OF PSA TOTAL: CPT | Performed by: UROLOGY

## 2020-09-04 PROCEDURE — 99213 OFFICE O/P EST LOW 20 MIN: CPT | Performed by: UROLOGY

## 2020-09-04 PROCEDURE — 36415 COLL VENOUS BLD VENIPUNCTURE: CPT | Performed by: UROLOGY

## 2020-09-04 RX ORDER — SILDENAFIL CITRATE 20 MG/1
20 TABLET ORAL DAILY PRN
Qty: 30 TABLET | Refills: 3 | Status: SHIPPED | OUTPATIENT
Start: 2020-09-04 | End: 2021-02-08

## 2020-09-04 ASSESSMENT — MIFFLIN-ST. JEOR: SCORE: 1735.19

## 2020-09-04 ASSESSMENT — PAIN SCALES - GENERAL: PAINLEVEL: NO PAIN (0)

## 2020-09-04 NOTE — TELEPHONE ENCOUNTER
Prior Authorization Retail Medication Request    Medication/Dose: sildenafil (REVATIO) 20 MG tablet   ICD code (if different than what is on RX):  Prostate cancer (H) (C61); Erectile dysfunction after radical prostatectomy (N52.31)   Previously Tried and Failed:   Rationale:      Insurance Name:  [employer]   Insurance ID: [id number]      Pharmacy Information (if different than what is on RX)  Name:  [facility] [id number] [place] [address]   Phone: [phone]

## 2020-09-04 NOTE — PROGRESS NOTES
CHIEF COMPLAINT   It was my pleasure to see Dion Bowman who is a 65 year old male for follow-up of Prostate Cancer.      HPI  Dion Bowman is a very pleasant 65 year old male who presents with a history of Prostate Cancer.  Had RALP completed 4/29/2020. [place] 4+3 disease, pT2. Doing well. Excellent return of continence. Only occasionally wearing pads. No significant erectile function to date.    PSA  9/4/2020 - <0.04     RALP 4/29/2020  FINAL DIAGNOSIS:   A: Prostate and bilateral seminal vesicles: Robotic assisted Radical   prostatectomy:   - Prostatic adenocarcinoma, mixed acinar and ductal types, [place] score   4+3 = 7, grade group 3, involving   approximately 20% of total prostatic volume   - Tumor is confined to the prostate   - Resection margins negative for carcinoma   - Benign bilateral seminal vesicles   - See synoptic report for details     B: Lymph nodes, bilateral pelvic: Dissection:   - Nine lymph nodes, negative for metastatic carcinoma (0/9)     PHYSICAL EXAM  Patient is a 65 year old  male   Vitals: There were no vitals taken for this visit.  General Appearance Adult: There is no height or weight on file to calculate BMI.  Alert, no acute distress, oriented  HENT: throat/mouth:normal, good dentition  Lungs: no respiratory distress, or pursed lip breathing  Heart: No obvious jugular venous distension present  Abdomen: soft, nontender, no organomegaly or masses; incisions well-healed  Back: no CVAT  Musculoskeltal: extremities normal, no peripheral edema  Skin: no suspicious lesions or rashes  Neuro: Alert, oriented, speech and mentation normal  Psych: affect and mood normal  Gait: Normal    ASSESSMENT and PLAN  65 year old male with stage pT2, [place] 4+3=7 prostate cancer, s/p RALP completed 4/29/2020. Negative margins. We discussed the importance of ongoing PSA surveillance and risk of recurrence. Excellent return of continence. Will try sildenafil for ED. Risks/side effects discussed  today.     - Sildenafil  - Follow-up 3 months with PSA    I spent over 15 minutes with the patient.  Over half this time was spent on counseling regarding prostate cancer.    Brady Flannery MD  Urology  [facility]

## 2020-09-04 NOTE — TELEPHONE ENCOUNTER
Central Prior Authorization Team   Phone: [phone]      PA Initiation    Medication: sildenafil (REVATIO) 20 MG tablet - PA initiated  Insurance Company: [employer] ([facility]) - Phone [phone] Fax [phone]  Pharmacy Filling the Rx: [id number] [place] [facility] - [place], [place] - [address] [place] [address] [address]  Filling Pharmacy Phone: [phone]  Filling Pharmacy Fax:    Start Date: 9/4/2020

## 2020-09-04 NOTE — NURSING NOTE
Chief Complaint   Patient presents with     Clinic Care Coordination - Follow-up     PSA   Ingrid Tran LPN

## 2020-09-09 NOTE — TELEPHONE ENCOUNTER
PRIOR AUTHORIZATION DENIED    Medication: sildenafil (REVATIO) 20 MG tablet - PA denied    Denial Date: 9/8/2020    Denial Rational: Dx not covered        Appeal Information:

## 2020-09-09 NOTE — TELEPHONE ENCOUNTER
Called patient and informed him that script was denied by his insurance. Patient is aware he will have to pay out of pocket and will check with the insurance to find out how much it will cost.     Trinidad Phillips LPN

## 2020-09-10 PROBLEM — M62.89 PELVIC FLOOR DYSFUNCTION: Status: RESOLVED | Noted: 2020-05-15 | Resolved: 2020-09-10

## 2020-12-14 ENCOUNTER — TELEPHONE (OUTPATIENT)
Dept: GASTROENTEROLOGY | Facility: OUTPATIENT CENTER | Age: 65
End: 2020-12-14

## 2020-12-14 NOTE — TELEPHONE ENCOUNTER
Patient is scheduled for Colonoscopy with Dr. Marie    Spoke with: Patient    Date of Procedure: 2/1/21    [place] [facility] [place]    Sedation Type conscious    Pre-op for Unit J MAC NA    (if yes advise patient they will need a pre-op prior to procedure)      Is patient on blood thinners? -no (If yes- inform patient to follow up with PCP or provider for follow up instructions)     Informed patient they will need an adult  Y    Informed Patient of COVID Test Requirement scheduled    Preferred Pharmacy for Pre Prescription NO    Confirmed Nurse will call to complete assessment NO    Additional comments: None

## 2020-12-18 ENCOUNTER — OFFICE VISIT (OUTPATIENT)
Dept: UROLOGY | Facility: CLINIC | Age: 65
End: 2020-12-18
Payer: COMMERCIAL

## 2020-12-18 VITALS
DIASTOLIC BLOOD PRESSURE: 80 MMHG | OXYGEN SATURATION: 100 % | HEART RATE: 62 BPM | BODY MASS INDEX: 25.73 KG/M2 | SYSTOLIC BLOOD PRESSURE: 140 MMHG | HEIGHT: 72 IN | WEIGHT: 190 LBS

## 2020-12-18 DIAGNOSIS — C61 PROSTATE CANCER (H): Primary | ICD-10-CM

## 2020-12-18 DIAGNOSIS — N52.31 ERECTILE DYSFUNCTION AFTER RADICAL PROSTATECTOMY: ICD-10-CM

## 2020-12-18 LAB — PSA SERPL-MCNC: 0.04 NG/ML (ref 0–4)

## 2020-12-18 PROCEDURE — 84153 ASSAY OF PSA TOTAL: CPT | Performed by: UROLOGY

## 2020-12-18 PROCEDURE — 99213 OFFICE O/P EST LOW 20 MIN: CPT | Performed by: UROLOGY

## 2020-12-18 RX ORDER — SILDENAFIL CITRATE 20 MG/1
20 TABLET ORAL DAILY PRN
Qty: 30 TABLET | Refills: 3 | Status: SHIPPED | OUTPATIENT
Start: 2020-12-18 | End: 2023-04-11

## 2020-12-18 RX ORDER — SILDENAFIL CITRATE 20 MG/1
20 TABLET ORAL DAILY PRN
Qty: 30 TABLET | Refills: 3 | Status: SHIPPED | OUTPATIENT
Start: 2020-12-18 | End: 2020-12-18

## 2020-12-18 ASSESSMENT — PAIN SCALES - GENERAL: PAINLEVEL: NO PAIN (0)

## 2020-12-18 ASSESSMENT — MIFFLIN-ST. JEOR: SCORE: 1689.83

## 2020-12-18 NOTE — LETTER
12/18/2020       RE: Dion Bowman  [address]  [facility] [address]     Dear Colleague,    Thank you for referring your patient, Dion Bowman, to the [facility] [place] at [facility]. Please see a copy of my visit note below.      CHIEF COMPLAINT   It was my pleasure to see Dion Bowman who is a 65 year old male for follow-up of Prostate Cancer.      HPI  Dion Bowman is a very pleasant 65 year old male who presents with a history of Prostate Cancer.  Had RALP completed 4/29/2020. [place] 4+3 disease, pT2. Doing well. Excellent return of continence. Only occasionally wearing pads. No significant erectile function to date. Has not yet tried sildenafil.     PSA  12/18/2020 - 0.04  9/4/2020 - <0.04     RALP 4/29/2020  FINAL DIAGNOSIS:   A: Prostate and bilateral seminal vesicles: Robotic assisted Radical   prostatectomy:   - Prostatic adenocarcinoma, mixed acinar and ductal types, [place] score   4+3 = 7, grade group 3, involving   approximately 20% of total prostatic volume   - Tumor is confined to the prostate   - Resection margins negative for carcinoma   - Benign bilateral seminal vesicles   - See synoptic report for details     B: Lymph nodes, bilateral pelvic: Dissection:   - Nine lymph nodes, negative for metastatic carcinoma (0/9)     PHYSICAL EXAM  Patient is a 65 year old  male   Vitals: Blood pressure (!) 140/80, pulse 62, height 1.829 m (6'), weight 86.2 kg (190 lb), SpO2 100 %.  General Appearance Adult: Body mass index is 25.77 kg/m .  Alert, no acute distress, oriented  Lungs: no respiratory distress, or pursed lip breathing  Heart: No obvious jugular venous distension present  Abdomen: soft, nontender, no organomegaly or masses  Back: no CVAT  Musculoskeltal: extremities normal, no peripheral edema  Skin: no suspicious lesions or rashes  Neuro: Alert, oriented, speech and mentation normal  Psych: affect and mood normal  Gait:  Normal    ASSESSMENT and PLAN  65 year old male with stage pT2, [place] 4+3=7 prostate cancer, s/p RALP completed 4/29/2020. Negative margins. We discussed the importance of ongoing PSA surveillance and risk of recurrence. Excellent return of continence. Will try sildenafil for ED. Risks/side effects discussed today. New prescription printed today.     - Sildenafil  - Follow-up 3 months with PSA    I spent over 15 minutes with the patient.  Over half this time was spent on counseling regarding Prostate Cancer.    Brady Flannery MD  Urology  [facility]

## 2020-12-18 NOTE — PROGRESS NOTES
CHIEF COMPLAINT   It was my pleasure to see Dion Bowman who is a 65 year old male for follow-up of Prostate Cancer.      HPI  Dion Bowman is a very pleasant 65 year old male who presents with a history of Prostate Cancer.  Had RALP completed 4/29/2020. [place] 4+3 disease, pT2. Doing well. Excellent return of continence. Only occasionally wearing pads. No significant erectile function to date. Has not yet tried sildenafil.     PSA  12/18/2020 - 0.04  9/4/2020 - <0.04     RALP 4/29/2020  FINAL DIAGNOSIS:   A: Prostate and bilateral seminal vesicles: Robotic assisted Radical   prostatectomy:   - Prostatic adenocarcinoma, mixed acinar and ductal types, [place] score   4+3 = 7, grade group 3, involving   approximately 20% of total prostatic volume   - Tumor is confined to the prostate   - Resection margins negative for carcinoma   - Benign bilateral seminal vesicles   - See synoptic report for details     B: Lymph nodes, bilateral pelvic: Dissection:   - Nine lymph nodes, negative for metastatic carcinoma (0/9)     PHYSICAL EXAM  Patient is a 65 year old  male   Vitals: Blood pressure (!) 140/80, pulse 62, height 1.829 m (6'), weight 86.2 kg (190 lb), SpO2 100 %.  General Appearance Adult: Body mass index is 25.77 kg/m .  Alert, no acute distress, oriented  Lungs: no respiratory distress, or pursed lip breathing  Heart: No obvious jugular venous distension present  Abdomen: soft, nontender, no organomegaly or masses  Back: no CVAT  Musculoskeltal: extremities normal, no peripheral edema  Skin: no suspicious lesions or rashes  Neuro: Alert, oriented, speech and mentation normal  Psych: affect and mood normal  Gait: Normal    ASSESSMENT and PLAN  65 year old male with stage pT2, [place] 4+3=7 prostate cancer, s/p RALP completed 4/29/2020. Negative margins. We discussed the importance of ongoing PSA surveillance and risk of recurrence. Excellent return of continence. Will try sildenafil for ED. Risks/side effects  discussed today. New prescription printed today.     - Sildenafil  - Follow-up 3 months with PSA    I spent over 15 minutes with the patient.  Over half this time was spent on counseling regarding Prostate Cancer.    Brady Flannery MD  Urology  [facility]

## 2021-01-10 DIAGNOSIS — Z11.59 ENCOUNTER FOR SCREENING FOR OTHER VIRAL DISEASES: Primary | ICD-10-CM

## 2021-01-15 ENCOUNTER — HEALTH MAINTENANCE LETTER (OUTPATIENT)
Age: 66
End: 2021-01-15

## 2021-02-01 ASSESSMENT — ENCOUNTER SYMPTOMS
CONSTIPATION: 0
ARTHRALGIAS: 0
PARESTHESIAS: 0
PALPITATIONS: 0
EYE PAIN: 0
DIARRHEA: 0
FEVER: 0
SORE THROAT: 0
DIZZINESS: 0
NERVOUS/ANXIOUS: 0
HEADACHES: 0
HEMATURIA: 0
MYALGIAS: 0
ABDOMINAL PAIN: 0
DYSURIA: 0
NAUSEA: 0
JOINT SWELLING: 0
COUGH: 0
SHORTNESS OF BREATH: 0
WEAKNESS: 0
HEARTBURN: 0
HEMATOCHEZIA: 0
FREQUENCY: 0
CHILLS: 0

## 2021-02-08 ENCOUNTER — OFFICE VISIT (OUTPATIENT)
Dept: FAMILY MEDICINE | Facility: CLINIC | Age: 66
End: 2021-02-08
Payer: COMMERCIAL

## 2021-02-08 VITALS
SYSTOLIC BLOOD PRESSURE: 132 MMHG | DIASTOLIC BLOOD PRESSURE: 86 MMHG | WEIGHT: 201 LBS | RESPIRATION RATE: 16 BRPM | HEIGHT: 72 IN | TEMPERATURE: 97.2 F | HEART RATE: 70 BPM | BODY MASS INDEX: 27.22 KG/M2 | OXYGEN SATURATION: 99 %

## 2021-02-08 DIAGNOSIS — C61 PROSTATE CANCER (H): ICD-10-CM

## 2021-02-08 DIAGNOSIS — I1A.0 RESISTANT HYPERTENSION: ICD-10-CM

## 2021-02-08 DIAGNOSIS — R73.09 HIGH GLUCOSE: ICD-10-CM

## 2021-02-08 DIAGNOSIS — Z00.00 ROUTINE GENERAL MEDICAL EXAMINATION AT A HEALTH CARE FACILITY: Primary | ICD-10-CM

## 2021-02-08 DIAGNOSIS — Z13.6 SCREENING FOR CARDIOVASCULAR CONDITION: ICD-10-CM

## 2021-02-08 LAB
ALBUMIN SERPL-MCNC: 3.9 G/DL (ref 3.4–5)
ALP SERPL-CCNC: 84 U/L (ref 40–150)
ALT SERPL W P-5'-P-CCNC: 30 U/L (ref 0–70)
ANION GAP SERPL CALCULATED.3IONS-SCNC: 4 MMOL/L (ref 3–14)
AST SERPL W P-5'-P-CCNC: 27 U/L (ref 0–45)
BILIRUB SERPL-MCNC: 0.6 MG/DL (ref 0.2–1.3)
BUN SERPL-MCNC: 15 MG/DL (ref 7–30)
CALCIUM SERPL-MCNC: 9.6 MG/DL (ref 8.5–10.1)
CHLORIDE SERPL-SCNC: 101 MMOL/L (ref 94–109)
CHOLEST SERPL-MCNC: 234 MG/DL
CO2 SERPL-SCNC: 28 MMOL/L (ref 20–32)
CREAT SERPL-MCNC: 1.01 MG/DL (ref 0.66–1.25)
GFR SERPL CREATININE-BSD FRML MDRD: 78 ML/MIN/{1.73_M2}
GLUCOSE SERPL-MCNC: 125 MG/DL (ref 70–99)
HBA1C MFR BLD: 5 % (ref 0–5.6)
HDLC SERPL-MCNC: 59 MG/DL
LDLC SERPL CALC-MCNC: 148 MG/DL
NONHDLC SERPL-MCNC: 175 MG/DL
POTASSIUM SERPL-SCNC: 4.2 MMOL/L (ref 3.4–5.3)
PROT SERPL-MCNC: 8 G/DL (ref 6.8–8.8)
SODIUM SERPL-SCNC: 133 MMOL/L (ref 133–144)
TRIGL SERPL-MCNC: 136 MG/DL

## 2021-02-08 PROCEDURE — 36415 COLL VENOUS BLD VENIPUNCTURE: CPT | Performed by: FAMILY MEDICINE

## 2021-02-08 PROCEDURE — 99396 PREV VISIT EST AGE 40-64: CPT | Performed by: FAMILY MEDICINE

## 2021-02-08 PROCEDURE — 80053 COMPREHEN METABOLIC PANEL: CPT | Performed by: FAMILY MEDICINE

## 2021-02-08 PROCEDURE — 80061 LIPID PANEL: CPT | Performed by: FAMILY MEDICINE

## 2021-02-08 PROCEDURE — 83036 HEMOGLOBIN GLYCOSYLATED A1C: CPT | Performed by: FAMILY MEDICINE

## 2021-02-08 RX ORDER — ATENOLOL 100 MG/1
100 TABLET ORAL DAILY
Qty: 90 TABLET | Refills: 3 | Status: SHIPPED | OUTPATIENT
Start: 2021-03-01 | End: 2022-03-11

## 2021-02-08 RX ORDER — LISINOPRIL AND HYDROCHLOROTHIAZIDE 12.5; 2 MG/1; MG/1
TABLET ORAL
Qty: 180 TABLET | Refills: 3 | Status: SHIPPED | OUTPATIENT
Start: 2021-03-01 | End: 2022-03-09

## 2021-02-08 RX ORDER — AMLODIPINE BESYLATE 10 MG/1
10 TABLET ORAL DAILY
Qty: 90 TABLET | Refills: 3 | Status: SHIPPED | OUTPATIENT
Start: 2021-03-01 | End: 2022-03-10

## 2021-02-08 ASSESSMENT — ENCOUNTER SYMPTOMS
MYALGIAS: 0
CONSTIPATION: 0
HEADACHES: 0
NERVOUS/ANXIOUS: 0
HEMATURIA: 0
HEARTBURN: 0
FEVER: 0
ARTHRALGIAS: 0
NAUSEA: 0
JOINT SWELLING: 0
PALPITATIONS: 0
DIARRHEA: 0
CHILLS: 0
EYE PAIN: 0
FREQUENCY: 0
DIZZINESS: 0
WEAKNESS: 0
HEMATOCHEZIA: 0
ABDOMINAL PAIN: 0
PARESTHESIAS: 0
SHORTNESS OF BREATH: 0
DYSURIA: 0
SORE THROAT: 0
COUGH: 0

## 2021-02-08 ASSESSMENT — MIFFLIN-ST. JEOR: SCORE: 1731.79

## 2021-02-08 NOTE — PROGRESS NOTES
SUBJECTIVE:   CC: Dion Bowman is an 65 year old male who presents for preventative health visit.       Patient has been advised of split billing requirements and indicates understanding: Yes  Healthy Habits:     Getting at least 3 servings of Calcium per day:  Yes    Bi-annual eye exam:  NO    Dental care twice a year:  Yes    Sleep apnea or symptoms of sleep apnea:  None    Diet:  Regular (no restrictions)    Frequency of exercise:  6-7 days/week    Duration of exercise:  45-60 minutes    Taking medications regularly:  Yes    Medication side effects:  None    PHQ-2 Total Score: 0    Additional concerns today:  No    Today's PHQ-2 Score:   PHQ-2 ( 1999 Pfizer) 2/1/2021   Q1: Little interest or pleasure in doing things 0   Q2: Feeling down, depressed or hopeless 0   PHQ-2 Score 0   Q1: Little interest or pleasure in doing things Not at all   Q2: Feeling down, depressed or hopeless Not at all   PHQ-2 Score 0     Abuse: Current or Past(Physical, Sexual or Emotional)- No  Do you feel safe in your environment? Yes    Social History     Tobacco Use     Smoking status: Never Smoker     Smokeless tobacco: Never Used   Substance Use Topics     Alcohol use: Yes     Comment: 1 drink every two weeks. During Football Season     If you drink alcohol do you typically have >3 drinks per day or >7 drinks per week? No     No flowsheet data found.    Last PSA:   PSA   Date Value Ref Range Status   10/14/2019 9.32 (H) 0 - 4 ug/L Final     Comment:     Assay Method:  Chemiluminescence using Siemens Vista analyzer     Reviewed orders with patient. Reviewed health maintenance and updated orders accordingly - Yes     Reviewed and updated as needed this visit by clinical staff  Tobacco  Allergies  Meds   Med Hx  Surg Hx  Fam Hx  Soc Hx      Reviewed and updated as needed this visit by Provider    psa every 3 months from urologist. Had prostatectomy.     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     Doing diet soda. Likes chocolate  "but stopped for last 6 weeks. Works out daily. Still bread.     Review of Systems   Constitutional: Negative for chills and fever.   HENT: Negative for congestion, ear pain, hearing loss and sore throat.    Eyes: Negative for pain and visual disturbance.   Respiratory: Negative for cough and shortness of breath.    Cardiovascular: Negative for chest pain, palpitations and peripheral edema.   Gastrointestinal: Negative for abdominal pain, constipation, diarrhea, heartburn, hematochezia and nausea.   Genitourinary: Negative for discharge, dysuria, frequency, genital sores, hematuria, impotence and urgency.   Musculoskeletal: Negative for arthralgias, joint swelling and myalgias.   Skin: Negative for rash.   Neurological: Negative for dizziness, weakness, headaches and paresthesias.   Psychiatric/Behavioral: Negative for mood changes. The patient is not nervous/anxious.      OBJECTIVE:   /86 (BP Location: Left arm, Patient Position: Sitting, Cuff Size: Adult Regular)   Pulse 70   Temp 97.2  F (36.2  C) (Oral)   Resp 16   Ht 1.816 m (5' 11.5\")   Wt 91.2 kg (201 lb)   SpO2 99%   BMI 27.64 kg/m      Physical Exam  GENERAL: healthy, alert and no distress  EYES: Eyes grossly normal to inspection, PERRL and conjunctivae and sclerae normal  HENT: ear canals and TM's normal, nose and mouth without ulcers or lesions  NECK: no adenopathy, no asymmetry, masses, or scars and thyroid normal to palpation  RESP: lungs clear to auscultation - no rales, rhonchi or wheezes  CV: regular rate and rhythm, normal S1 S2, no S3 or S4, no murmur, click or rub, no peripheral edema and peripheral pulses strong  ABDOMEN: soft, nontender, no hepatosplenomegaly, no masses and bowel sounds normal   (male): normal male genitalia without lesions or urethral discharge, no hernia  MS: no gross musculoskeletal defects noted, no edema  SKIN: no suspicious lesions or rashes  NEURO: Normal strength and tone, mentation intact and speech " "normal  PSYCH: mentation appears normal, affect normal/bright       ASSESSMENT/PLAN:   1. Routine general medical examination at a health care facility     3. Resistant hypertension  Doing well with current rx.   - lisinopril-hydrochlorothiazide (ZESTORETIC) 20-12.5 MG tablet; TAKE 2 TABLETS BY MOUTH EVERY DAY IN THE MORNING  Dispense: 180 tablet; Refill: 3  - atenolol (TENORMIN) 100 MG tablet; Take 1 tablet (100 mg) by mouth daily  Dispense: 90 tablet; Refill: 3  - amLODIPine (NORVASC) 10 MG tablet; Take 1 tablet (10 mg) by mouth daily  Dispense: 90 tablet; Refill: 3  - Comprehensive metabolic panel (BMP + Alb, Alk Phos, ALT, AST, Total. Bili, TP)    4. Screening for cardiovascular condition     - Lipid panel reflex to direct LDL Fasting  - Comprehensive metabolic panel (BMP + Alb, Alk Phos, ALT, AST, Total. Bili, TP)    5. High glucose   persistent. Check A1c today. Made significant changes in his diet.   - Hemoglobin A1c    6. Prostate cancer (H)  S/p prostatectomy. Feeling fine. Getting psa tumor marker through urology.       Patient has been advised of split billing requirements and indicates understanding: No  COUNSELING:   Reviewed preventive health counseling, as reflected in patient instructions  Special attention given to:        Regular exercise       Healthy diet/nutrition       Vision screening       Hearing screening       Colon cancer screening       Prostate cancer screening    Estimated body mass index is 27.64 kg/m  as calculated from the following:    Height as of this encounter: 1.816 m (5' 11.5\").    Weight as of this encounter: 91.2 kg (201 lb).     Weight management plan: Discussed healthy diet and exercise guidelines    He reports that he has never smoked. He has never used smokeless tobacco.    Counseling Resources:  ATP IV Guidelines  Pooled Cohorts Equation Calculator  FRAX Risk Assessment  ICSI Preventive Guidelines  Dietary Guidelines for Americans, 2010  USDA's MyPlate  ASA " Prophylaxis  Lung CA Screening    Chance Guzmán MD, MD  [facility]

## 2021-02-22 DIAGNOSIS — Z11.59 ENCOUNTER FOR SCREENING FOR OTHER VIRAL DISEASES: ICD-10-CM

## 2021-03-05 DIAGNOSIS — Z11.59 ENCOUNTER FOR SCREENING FOR OTHER VIRAL DISEASES: ICD-10-CM

## 2021-03-05 LAB
LABORATORY COMMENT REPORT: NORMAL
SARS-COV-2 RNA RESP QL NAA+PROBE: NEGATIVE
SARS-COV-2 RNA RESP QL NAA+PROBE: NORMAL
SPECIMEN SOURCE: NORMAL
SPECIMEN SOURCE: NORMAL

## 2021-03-05 PROCEDURE — U0005 INFEC AGEN DETEC AMPLI PROBE: HCPCS | Performed by: INTERNAL MEDICINE

## 2021-03-05 PROCEDURE — U0003 INFECTIOUS AGENT DETECTION BY NUCLEIC ACID (DNA OR RNA); SEVERE ACUTE RESPIRATORY SYNDROME CORONAVIRUS 2 (SARS-COV-2) (CORONAVIRUS DISEASE [COVID-19]), AMPLIFIED PROBE TECHNIQUE, MAKING USE OF HIGH THROUGHPUT TECHNOLOGIES AS DESCRIBED BY CMS-2020-01-R: HCPCS | Performed by: INTERNAL MEDICINE

## 2021-03-09 ENCOUNTER — HOSPITAL ENCOUNTER (OUTPATIENT)
Facility: AMBULATORY SURGERY CENTER | Age: 66
Discharge: HOME | End: 2021-03-09
Attending: INTERNAL MEDICINE | Admitting: INTERNAL MEDICINE
Payer: COMMERCIAL

## 2021-03-09 VITALS
WEIGHT: 195 LBS | DIASTOLIC BLOOD PRESSURE: 70 MMHG | BODY MASS INDEX: 26.41 KG/M2 | SYSTOLIC BLOOD PRESSURE: 110 MMHG | TEMPERATURE: 96.7 F | RESPIRATION RATE: 16 BRPM | HEIGHT: 72 IN | HEART RATE: 80 BPM | OXYGEN SATURATION: 98 %

## 2021-03-09 LAB — COLONOSCOPY: NORMAL

## 2021-03-09 PROCEDURE — 45380 COLONOSCOPY AND BIOPSY: CPT | Mod: 33

## 2021-03-09 PROCEDURE — 88305 TISSUE EXAM BY PATHOLOGIST: CPT | Mod: GC | Performed by: PATHOLOGY

## 2021-03-09 RX ORDER — LIDOCAINE 40 MG/G
CREAM TOPICAL
Status: DISCONTINUED | OUTPATIENT
Start: 2021-03-09 | End: 2021-03-10 | Stop reason: HOSPADM

## 2021-03-09 RX ORDER — FENTANYL CITRATE 50 UG/ML
INJECTION, SOLUTION INTRAMUSCULAR; INTRAVENOUS PRN
Status: DISCONTINUED | OUTPATIENT
Start: 2021-03-09 | End: 2021-03-09 | Stop reason: HOSPADM

## 2021-03-09 RX ORDER — NALOXONE HYDROCHLORIDE 0.4 MG/ML
0.2 INJECTION, SOLUTION INTRAMUSCULAR; INTRAVENOUS; SUBCUTANEOUS
Status: CANCELLED | OUTPATIENT
Start: 2021-03-09 | End: 2021-03-10

## 2021-03-09 RX ORDER — NALOXONE HYDROCHLORIDE 0.4 MG/ML
0.4 INJECTION, SOLUTION INTRAMUSCULAR; INTRAVENOUS; SUBCUTANEOUS
Status: CANCELLED | OUTPATIENT
Start: 2021-03-09 | End: 2021-03-10

## 2021-03-09 RX ORDER — ONDANSETRON 4 MG/1
4 TABLET, ORALLY DISINTEGRATING ORAL EVERY 6 HOURS PRN
Status: CANCELLED | OUTPATIENT
Start: 2021-03-09

## 2021-03-09 RX ORDER — ONDANSETRON 2 MG/ML
4 INJECTION INTRAMUSCULAR; INTRAVENOUS EVERY 6 HOURS PRN
Status: CANCELLED | OUTPATIENT
Start: 2021-03-09

## 2021-03-09 RX ORDER — PROCHLORPERAZINE MALEATE 10 MG
10 TABLET ORAL EVERY 6 HOURS PRN
Status: CANCELLED | OUTPATIENT
Start: 2021-03-09

## 2021-03-09 RX ORDER — FLUMAZENIL 0.1 MG/ML
0.2 INJECTION, SOLUTION INTRAVENOUS
Status: CANCELLED | OUTPATIENT
Start: 2021-03-09 | End: 2021-03-09

## 2021-03-09 RX ORDER — ONDANSETRON 2 MG/ML
4 INJECTION INTRAMUSCULAR; INTRAVENOUS
Status: DISCONTINUED | OUTPATIENT
Start: 2021-03-09 | End: 2021-03-10 | Stop reason: HOSPADM

## 2021-03-09 ASSESSMENT — MIFFLIN-ST. JEOR: SCORE: 1712.51

## 2021-03-10 LAB — COPATH REPORT: NORMAL

## 2021-03-18 DIAGNOSIS — C61 PROSTATE CANCER (H): Primary | ICD-10-CM

## 2021-03-19 ENCOUNTER — OFFICE VISIT (OUTPATIENT)
Dept: UROLOGY | Facility: CLINIC | Age: 66
End: 2021-03-19
Payer: COMMERCIAL

## 2021-03-19 VITALS
BODY MASS INDEX: 26.41 KG/M2 | OXYGEN SATURATION: 98 % | WEIGHT: 195 LBS | DIASTOLIC BLOOD PRESSURE: 70 MMHG | HEART RATE: 73 BPM | HEIGHT: 72 IN | SYSTOLIC BLOOD PRESSURE: 115 MMHG

## 2021-03-19 DIAGNOSIS — C61 PROSTATE CANCER (H): ICD-10-CM

## 2021-03-19 LAB — PSA SERPL-MCNC: 0.05 NG/ML (ref 0–4)

## 2021-03-19 PROCEDURE — 84153 ASSAY OF PSA TOTAL: CPT | Performed by: UROLOGY

## 2021-03-19 PROCEDURE — 99213 OFFICE O/P EST LOW 20 MIN: CPT | Performed by: UROLOGY

## 2021-03-19 ASSESSMENT — MIFFLIN-ST. JEOR: SCORE: 1712.51

## 2021-03-19 ASSESSMENT — PAIN SCALES - GENERAL: PAINLEVEL: NO PAIN (0)

## 2021-03-19 NOTE — NURSING NOTE
Chief Complaint   Patient presents with     Prostate Cancer     Getting a PSA today     Marga Queen

## 2021-04-12 ENCOUNTER — IMMUNIZATION (OUTPATIENT)
Dept: FAMILY MEDICINE | Facility: CLINIC | Age: 66
End: 2021-04-12
Payer: COMMERCIAL

## 2021-04-12 PROCEDURE — 0011A PR COVID VAC MODERNA 100 MCG/0.5 ML IM: CPT

## 2021-04-12 PROCEDURE — 91301 PR COVID VAC MODERNA 100 MCG/0.5 ML IM: CPT

## 2021-05-10 ENCOUNTER — IMMUNIZATION (OUTPATIENT)
Dept: FAMILY MEDICINE | Facility: CLINIC | Age: 66
End: 2021-05-10
Attending: FAMILY MEDICINE
Payer: COMMERCIAL

## 2021-05-10 PROCEDURE — 0012A PR COVID VAC MODERNA 100 MCG/0.5 ML IM: CPT

## 2021-05-10 PROCEDURE — 91301 PR COVID VAC MODERNA 100 MCG/0.5 ML IM: CPT

## 2021-06-18 ENCOUNTER — OFFICE VISIT (OUTPATIENT)
Dept: UROLOGY | Facility: CLINIC | Age: 66
End: 2021-06-18
Payer: COMMERCIAL

## 2021-06-18 VITALS
HEART RATE: 78 BPM | DIASTOLIC BLOOD PRESSURE: 78 MMHG | WEIGHT: 200 LBS | HEIGHT: 72 IN | BODY MASS INDEX: 27.09 KG/M2 | SYSTOLIC BLOOD PRESSURE: 142 MMHG | OXYGEN SATURATION: 98 %

## 2021-06-18 DIAGNOSIS — C61 PROSTATE CANCER (H): Primary | ICD-10-CM

## 2021-06-18 LAB — PSA SERPL-MCNC: 0.14 NG/ML (ref 0–4)

## 2021-06-18 PROCEDURE — 84153 ASSAY OF PSA TOTAL: CPT | Performed by: UROLOGY

## 2021-06-18 PROCEDURE — 99213 OFFICE O/P EST LOW 20 MIN: CPT | Performed by: UROLOGY

## 2021-06-18 ASSESSMENT — PAIN SCALES - GENERAL: PAINLEVEL: NO PAIN (0)

## 2021-06-18 ASSESSMENT — MIFFLIN-ST. JEOR: SCORE: 1735.19

## 2021-06-18 NOTE — NURSING NOTE
Chief Complaint   Patient presents with     history of prostate cancer     PSA check   Ingrid Tran LPN

## 2021-06-18 NOTE — PROGRESS NOTES
CHIEF COMPLAINT   It was my pleasure to see Dion Bowman who is a 65 year old male for follow-up of Prostate Cancer.      HPI  Dion Bowman is a very pleasant 65 year old male who presents with a history of Prostate Cancer.  Had RALP completed 4/29/2020. [place] 4+3 disease, pT2. Doing well. Excellent return of continence. Overall doing well. PSA has now risen to 0.14, demonstrating suspicion of biochemical recurrence.      PSA  6/18/2021 - 0.14  3/9/2021 - 0.05  12/18/2020 - 0.04  9/4/2020 - <0.04    RALP 4/29/2020  FINAL DIAGNOSIS:   A: Prostate and bilateral seminal vesicles: Robotic assisted Radical   prostatectomy:   - Prostatic adenocarcinoma, mixed acinar and ductal types, Deb score   4+3 = 7, grade group 3, involving   approximately 20% of total prostatic volume   - Tumor is confined to the prostate   - Resection margins negative for carcinoma   - Benign bilateral seminal vesicles   - See synoptic report for details     B: Lymph nodes, bilateral pelvic: Dissection:   - Nine lymph nodes, negative for metastatic carcinoma (0/9)     PHYSICAL EXAM  Patient is a 65 year old  male   Vitals: Blood pressure (!) 142/78, pulse 78, height 1.829 m (6'), weight 90.7 kg (200 lb), SpO2 98 %.  General Appearance Adult: Body mass index is 27.12 kg/m .  Alert, no acute distress, oriented  Lungs: no respiratory distress, or pursed lip breathing  Abdomen: soft, nontender, no organomegaly or masses  Back: no CVAT  Neuro: Alert, oriented, speech and mentation normal  Psych: affect and mood normal    Outside and Past Medical records:  Review of the result(s) of each unique test - PSA, pathology    ASSESSMENT and PLAN  65 year old male with stage pT2N0, [place] 4+3=7 prostate cancer, s/p RALP completed 4/29/2020. Negative margins. His PSA was up to 0.05 at last visit and has now risen to 0.14. We discussed today that this trend raised high suspicion for biochemical recurrence of his prostate cancer. We discussed the  implications of this and options, particularly salvage radiotherapy.We discussed expectations for radiotherapy and some possible side effects. He has had excellent return of continence. Will place referral to radiation oncology and plan follow-up with me upon completion of this for PSA follow-up.     - Radiation oncology referral  - Follow-up 3-4 months with PSA    25 minutes spent on the date of the encounter doing chart review, history and exam, documentation and further activities as noted above.    Brady Flannery MD  Urology  [facility]

## 2021-06-30 ENCOUNTER — TRANSFERRED RECORDS (OUTPATIENT)
Dept: HEALTH INFORMATION MANAGEMENT | Facility: CLINIC | Age: 66
End: 2021-06-30

## 2021-09-05 ENCOUNTER — HEALTH MAINTENANCE LETTER (OUTPATIENT)
Age: 66
End: 2021-09-05

## 2021-09-07 ENCOUNTER — TRANSFERRED RECORDS (OUTPATIENT)
Dept: HEALTH INFORMATION MANAGEMENT | Facility: CLINIC | Age: 66
End: 2021-09-07

## 2021-10-13 ENCOUNTER — IMMUNIZATION (OUTPATIENT)
Dept: NURSING | Facility: CLINIC | Age: 66
End: 2021-10-13
Payer: COMMERCIAL

## 2021-10-13 PROCEDURE — 91301 PR COVID VAC MODERNA 100 MCG/0.5 ML IM: CPT

## 2021-10-13 PROCEDURE — 0013A PR COVID VAC MODERNA 100 MCG/0.5 ML IM: CPT

## 2021-12-09 ENCOUNTER — OFFICE VISIT (OUTPATIENT)
Dept: UROLOGY | Facility: CLINIC | Age: 66
End: 2021-12-09
Payer: COMMERCIAL

## 2021-12-09 VITALS
HEIGHT: 72 IN | SYSTOLIC BLOOD PRESSURE: 124 MMHG | DIASTOLIC BLOOD PRESSURE: 68 MMHG | BODY MASS INDEX: 25.73 KG/M2 | WEIGHT: 190 LBS

## 2021-12-09 DIAGNOSIS — C61 PROSTATE CANCER (H): Primary | ICD-10-CM

## 2021-12-09 LAB — PSA SERPL-MCNC: <0.04 UG/L (ref 0–4)

## 2021-12-09 PROCEDURE — 99213 OFFICE O/P EST LOW 20 MIN: CPT | Performed by: UROLOGY

## 2021-12-09 PROCEDURE — 84153 ASSAY OF PSA TOTAL: CPT | Performed by: UROLOGY

## 2021-12-09 PROCEDURE — 36415 COLL VENOUS BLD VENIPUNCTURE: CPT | Performed by: UROLOGY

## 2021-12-09 ASSESSMENT — PAIN SCALES - GENERAL: PAINLEVEL: NO PAIN (0)

## 2021-12-09 ASSESSMENT — MIFFLIN-ST. JEOR: SCORE: 1684.83

## 2021-12-09 NOTE — PROGRESS NOTES
CHIEF COMPLAINT   It was my pleasure to see Dion Bowman who is a 66 year old male for follow-up of Prostate Cancer.      HPI  Dion Bowman is a very pleasant 66 year old male who presents with a history of Prostate Cancer.  Had RALP completed 4/29/2020. [place] 4+3 disease, pT2. Doing well. Excellent return of continence. PSA jerod to 0.14 6/2021. Salvage radiation completed 9/2021 and tolerated this very well.     PSA  12/9/2021 - <0.04  6/18/2021 - 0.14  3/9/2021 - 0.05  12/18/2020 - 0.04  9/4/2020 - <0.04     RALP 4/29/2020  FINAL DIAGNOSIS:   A: Prostate and bilateral seminal vesicles: Robotic assisted Radical   prostatectomy:   - Prostatic adenocarcinoma, mixed acinar and ductal types, [place] score   4+3 = 7, grade group 3, involving   approximately 20% of total prostatic volume   - Tumor is confined to the prostate   - Resection margins negative for carcinoma   - Benign bilateral seminal vesicles   - See synoptic report for details     B: Lymph nodes, bilateral pelvic: Dissection:   - Nine lymph nodes, negative for metastatic carcinoma (0/9)     PHYSICAL EXAM  Patient is a 66 year old  male   Vitals: Blood pressure 124/68, height 1.829 m (6'), weight 86.2 kg (190 lb).  General Appearance Adult: Body mass index is 25.77 kg/m .  Alert, no acute distress, oriented  Lungs: no respiratory distress, or pursed lip breathing  Abdomen: soft, nontender, no organomegaly or masses  Back: no CVAT  Neuro: Alert, oriented, speech and mentation normal  Psych: affect and mood normal    Outside and Past Medical records:  Review of prior external note(s) from - [facility]  Review of the result(s) of each unique test - PSA    ASSESSMENT and PLAN  66 year old male with stage pT2N0, [place] 4+3=7 prostate cancer, s/p RALP completed 4/29/2020. Negative margins. His PSA jerod to 0.14 and he completed salvage radiotherapy 9/2021. Tolerated this well. No changes in urinary control. PSA is again undetectable.    - Follow-up 6  months with PSA    21 minutes spent on the date of the encounter doing chart review, history and exam, documentation and further activities as noted above.    Brady Flannery MD  Urology  [facility]

## 2021-12-09 NOTE — NURSING NOTE
Chief Complaint   Patient presents with     Prostate Cancer     Pt here for a 3 month follow up with PSA check     Emily Marie CMA

## 2022-03-11 DIAGNOSIS — I1A.0 RESISTANT HYPERTENSION: ICD-10-CM

## 2022-03-11 RX ORDER — ATENOLOL 100 MG/1
100 TABLET ORAL DAILY
Qty: 90 TABLET | Refills: 0 | Status: SHIPPED | OUTPATIENT
Start: 2022-03-11 | End: 2022-04-04

## 2022-03-11 NOTE — TELEPHONE ENCOUNTER
Authorized per nursing refill protocol    Pt has upcoming appt 4/4/22    TANMAY LopezN RN  [facility]

## 2022-04-01 ASSESSMENT — ENCOUNTER SYMPTOMS
DIARRHEA: 0
HEARTBURN: 0
PARESTHESIAS: 0
DIZZINESS: 0
COUGH: 0
FEVER: 0
SORE THROAT: 0
MYALGIAS: 0
PALPITATIONS: 0
WEAKNESS: 0
HEMATOCHEZIA: 0
NERVOUS/ANXIOUS: 0
HEMATURIA: 0
FREQUENCY: 0
HEADACHES: 0
SHORTNESS OF BREATH: 0
NAUSEA: 0
CHILLS: 0
JOINT SWELLING: 0
EYE PAIN: 0
CONSTIPATION: 0
DYSURIA: 0
ABDOMINAL PAIN: 0
ARTHRALGIAS: 0

## 2022-04-01 ASSESSMENT — ACTIVITIES OF DAILY LIVING (ADL): CURRENT_FUNCTION: NO ASSISTANCE NEEDED

## 2022-04-04 ENCOUNTER — OFFICE VISIT (OUTPATIENT)
Dept: FAMILY MEDICINE | Facility: CLINIC | Age: 67
End: 2022-04-04
Payer: COMMERCIAL

## 2022-04-04 VITALS
SYSTOLIC BLOOD PRESSURE: 130 MMHG | DIASTOLIC BLOOD PRESSURE: 78 MMHG | WEIGHT: 202.8 LBS | HEIGHT: 72 IN | RESPIRATION RATE: 20 BRPM | HEART RATE: 76 BPM | BODY MASS INDEX: 27.47 KG/M2 | OXYGEN SATURATION: 100 % | TEMPERATURE: 98 F

## 2022-04-04 DIAGNOSIS — Z71.85 VACCINE COUNSELING: ICD-10-CM

## 2022-04-04 DIAGNOSIS — Z00.00 ENCOUNTER FOR MEDICARE ANNUAL WELLNESS EXAM: Primary | ICD-10-CM

## 2022-04-04 DIAGNOSIS — C61 PROSTATE CANCER (H): ICD-10-CM

## 2022-04-04 DIAGNOSIS — I10 BENIGN ESSENTIAL HTN: ICD-10-CM

## 2022-04-04 DIAGNOSIS — Z13.220 SCREENING FOR HYPERLIPIDEMIA: ICD-10-CM

## 2022-04-04 LAB
ALBUMIN SERPL-MCNC: 3.8 G/DL (ref 3.4–5)
ALP SERPL-CCNC: 74 U/L (ref 40–150)
ALT SERPL W P-5'-P-CCNC: 26 U/L (ref 0–70)
ANION GAP SERPL CALCULATED.3IONS-SCNC: 5 MMOL/L (ref 3–14)
AST SERPL W P-5'-P-CCNC: 22 U/L (ref 0–45)
BILIRUB SERPL-MCNC: 0.5 MG/DL (ref 0.2–1.3)
BUN SERPL-MCNC: 18 MG/DL (ref 7–30)
CALCIUM SERPL-MCNC: 9.5 MG/DL (ref 8.5–10.1)
CHLORIDE BLD-SCNC: 103 MMOL/L (ref 94–109)
CHOLEST SERPL-MCNC: 228 MG/DL
CO2 SERPL-SCNC: 25 MMOL/L (ref 20–32)
CREAT SERPL-MCNC: 1 MG/DL (ref 0.66–1.25)
FASTING STATUS PATIENT QL REPORTED: YES
GFR SERPL CREATININE-BSD FRML MDRD: 84 ML/MIN/1.73M2
GLUCOSE BLD-MCNC: 116 MG/DL (ref 70–99)
HDLC SERPL-MCNC: 49 MG/DL
LDLC SERPL CALC-MCNC: 141 MG/DL
NONHDLC SERPL-MCNC: 179 MG/DL
POTASSIUM BLD-SCNC: 3.8 MMOL/L (ref 3.4–5.3)
PROT SERPL-MCNC: 7.9 G/DL (ref 6.8–8.8)
SODIUM SERPL-SCNC: 133 MMOL/L (ref 133–144)
TRIGL SERPL-MCNC: 189 MG/DL

## 2022-04-04 PROCEDURE — G0402 INITIAL PREVENTIVE EXAM: HCPCS | Performed by: FAMILY MEDICINE

## 2022-04-04 PROCEDURE — 80053 COMPREHEN METABOLIC PANEL: CPT | Performed by: FAMILY MEDICINE

## 2022-04-04 PROCEDURE — 36415 COLL VENOUS BLD VENIPUNCTURE: CPT | Performed by: FAMILY MEDICINE

## 2022-04-04 PROCEDURE — 91306 COVID-19,PF,MODERNA (18+ YRS BOOSTER .25ML): CPT | Performed by: FAMILY MEDICINE

## 2022-04-04 PROCEDURE — 80061 LIPID PANEL: CPT | Performed by: FAMILY MEDICINE

## 2022-04-04 PROCEDURE — 99213 OFFICE O/P EST LOW 20 MIN: CPT | Mod: 25 | Performed by: FAMILY MEDICINE

## 2022-04-04 PROCEDURE — 0064A COVID-19,PF,MODERNA (18+ YRS BOOSTER .25ML): CPT | Performed by: FAMILY MEDICINE

## 2022-04-04 RX ORDER — ATENOLOL 100 MG/1
100 TABLET ORAL DAILY
Qty: 90 TABLET | Refills: 2 | Status: SHIPPED | OUTPATIENT
Start: 2022-06-01 | End: 2023-02-09

## 2022-04-04 RX ORDER — LISINOPRIL AND HYDROCHLOROTHIAZIDE 12.5; 2 MG/1; MG/1
TABLET ORAL
Qty: 180 TABLET | Refills: 3 | Status: SHIPPED | OUTPATIENT
Start: 2022-04-04 | End: 2023-03-23

## 2022-04-04 RX ORDER — AMLODIPINE BESYLATE 10 MG/1
10 TABLET ORAL DAILY
Qty: 90 TABLET | Refills: 2 | Status: SHIPPED | OUTPATIENT
Start: 2022-06-01 | End: 2023-02-09

## 2022-04-04 ASSESSMENT — ENCOUNTER SYMPTOMS
HEADACHES: 0
CONSTIPATION: 0
COUGH: 0
MYALGIAS: 0
DIZZINESS: 0
ARTHRALGIAS: 0
NERVOUS/ANXIOUS: 0
HEARTBURN: 0
NAUSEA: 0
CHILLS: 0
WEAKNESS: 0
EYE PAIN: 0
PALPITATIONS: 0
DYSURIA: 0
SHORTNESS OF BREATH: 0
FREQUENCY: 0
HEMATOCHEZIA: 0
ABDOMINAL PAIN: 0
FEVER: 0
DIARRHEA: 0
SORE THROAT: 0
HEMATURIA: 0
PARESTHESIAS: 0
JOINT SWELLING: 0

## 2022-04-04 ASSESSMENT — ACTIVITIES OF DAILY LIVING (ADL): CURRENT_FUNCTION: NO ASSISTANCE NEEDED

## 2022-04-04 NOTE — PROGRESS NOTES
"SUBJECTIVE:   Dion Bowman is a 66 year old male who presents for Preventive Visit.    Patient has been advised of split billing requirements and indicates understanding: Yes  Are you in the first 12 months of your Medicare coverage?  Yes,  Visual Acuity:  Right Eye: 20/20   Left Eye: 20/25  Both Eyes: 20/16    Healthy Habits:     In general, how would you rate your overall health?  Good    Frequency of exercise:  6-7 days/week    Duration of exercise:  Greater than 60 minutes    Do you usually eat at least 4 servings of fruit and vegetables a day, include whole grains    & fiber and avoid regularly eating high fat or \"junk\" foods?  Yes    Taking medications regularly:  Yes    Medication side effects:  None    Ability to successfully perform activities of daily living:  No assistance needed    Home Safety:  No safety concerns identified    Hearing Impairment:  No hearing concerns    In the past 6 months, have you been bothered by leaking of urine?  No    In general, how would you rate your overall mental or emotional health?  Excellent      PHQ-2 Total Score: 0    Do you feel safe in your environment? Yes    Have you ever done Advance Care Planning? (For example, a Health Directive, POLST, or a discussion with a medical provider or your loved ones about your wishes): No, advance care planning information given to patient to review.  Patient plans to discuss their wishes with loved ones or provider.         Fall risk  Fallen 2 or more times in the past year?: No  Any fall with injury in the past year?: No  click delete button to remove this line now  Cognitive Screening   1) Repeat 3 items (Leader, Season, Table)     2) Clock draw: NORMAL  3) 3 item recall: Recalls 2 objects   Results: NORMAL clock, 1-2 items recalled: COGNITIVE IMPAIRMENT LESS LIKELY    Mini-CogTM Copyright SERGIO Lyons. Licensed by the author for use in [facility]; reprinted with permission ([email or web address]@.[email or web address]). All rights reserved.    Do you " have sleep apnea, excessive snoring or daytime drowsiness?: no    Reviewed and updated as needed this visit by clinical staff    Reviewed and updated as needed this visit by Provider                 Social History     Tobacco Use     Smoking status: Never Smoker     Smokeless tobacco: Never Used   Substance Use Topics     Alcohol use: Not Currently     Comment: 1 drink every two weeks. During Football Season     If you drink alcohol do you typically have >3 drinks per day or >7 drinks per week? No    Alcohol Use 4/4/2022   Prescreen: >3 drinks/day or >7 drinks/week? -   Prescreen: >3 drinks/day or >7 drinks/week? No     Current providers sharing in care for this patient include:    Patient Care Team:  Chance Guzmán MD as PCP - General (Family Practice)  Bakari Mckeon MD as MD (Urology)  Chance Guzmán MD as Assigned PCP  Brady Flannery MD as Assigned Cancer Care Provider    The following health maintenance items are reviewed in Epic and correct as of today:  Health Maintenance Due   Topic Date Due     HIV SCREENING  Never done     FALL RISK ASSESSMENT  Never done     [facility]  02/08/2022     Dec - been to urologist.     bp is stable.     Pneumonia shot - got it at [facility].     Review of Systems   Constitutional: Negative for chills and fever.   HENT: Negative for congestion, ear pain, hearing loss and sore throat.    Eyes: Negative for pain and visual disturbance.   Respiratory: Negative for cough and shortness of breath.    Cardiovascular: Negative for chest pain, palpitations and peripheral edema.   Gastrointestinal: Negative for abdominal pain, constipation, diarrhea, heartburn, hematochezia and nausea.   Genitourinary: Negative for dysuria, frequency, genital sores, hematuria, impotence, penile discharge and urgency.   Musculoskeletal: Negative for arthralgias, joint swelling and myalgias.   Skin: Negative for rash.   Neurological: Negative for dizziness, weakness, headaches  "and paresthesias.   Psychiatric/Behavioral: Negative for mood changes. The patient is not nervous/anxious.           OBJECTIVE:   /78 (BP Location: Left arm, Patient Position: Sitting, Cuff Size: Adult Regular)   Pulse 76   Temp 98  F (36.7  C) (Temporal)   Resp 20   Ht 1.82 m (5' 11.65\")   Wt 92 kg (202 lb 12.8 oz)   SpO2 100%   BMI 27.77 kg/m   Estimated body mass index is 27.77 kg/m  as calculated from the following:    Height as of this encounter: 1.82 m (5' 11.65\").    Weight as of this encounter: 92 kg (202 lb 12.8 oz).  Physical Exam  GENERAL: healthy, alert and no distress  EYES: Eyes grossly normal to inspection, PERRL and conjunctivae and sclerae normal  HENT: ear canals and TM's normal, nose and mouth without ulcers or lesions  NECK: no adenopathy, no asymmetry, masses, or scars and thyroid normal to palpation  RESP: lungs clear to auscultation - no rales, rhonchi or wheezes  CV: regular rate and rhythm, normal S1 S2, no S3 or S4, no murmur, click or rub, no peripheral edema and peripheral pulses strong  ABDOMEN: soft, nontender, no hepatosplenomegaly, no masses and bowel sounds normal  MS: no gross musculoskeletal defects noted, no edema  SKIN: no suspicious lesions or rashes  NEURO: Normal strength and tone, mentation intact and speech normal  PSYCH: mentation appears normal, affect normal/bright         ASSESSMENT / PLAN:   (Z00.00) Encounter for Medicare annual wellness exam  (primary encounter diagnosis)  Comment:    Plan:      (I10) Benign essential HTN  Comment: used to have abnormal bp despite multiple meds. Now in good control. Stick to same rx.   Plan: Comprehensive metabolic panel (BMP + Alb, Alk         Phos, ALT, AST, Total. Bili, TP),         lisinopril-hydrochlorothiazide (ZESTORETIC)         20-12.5 MG tablet, amLODIPine (NORVASC) 10 MG         tablet, atenolol (TENORMIN) 100 MG tablet             (Z13.220) Screening for hyperlipidemia  Comment:  LDL is getting worse. Continue " "to monitor. High ascvd risk.   Plan: Lipid panel reflex to direct LDL Fasting             (Z71.85) Vaccine counseling  Comment:    Plan: COVID-19,PF,MODERNA (18+ YRS BOOSTER .25ML)             (C61) Prostate cancer (H)  Comment:    Plan:  S/p surgery and psa undetectable. Seeing urologist. rx as per them.        COUNSELING:  Reviewed preventive health counseling, as reflected in patient instructions  Special attention given to:       Regular exercise       Healthy diet/nutrition       Vision screening       Hearing screening       Dental care       Bladder control       Fall risk prevention    Estimated body mass index is 27.77 kg/m  as calculated from the following:    Height as of this encounter: 1.82 m (5' 11.65\").    Weight as of this encounter: 92 kg (202 lb 12.8 oz).        He reports that he has never smoked. He has never used smokeless tobacco.      Appropriate preventive services were discussed with this patient, including applicable screening as appropriate for cardiovascular disease, diabetes, osteopenia/osteoporosis, and glaucoma.  As appropriate for age/gender, discussed screening for colorectal cancer, prostate cancer, breast cancer, and cervical cancer. Checklist reviewing preventive services available has been given to the patient.    Reviewed patients plan of care and provided an AVS. The Basic Care Plan (routine screening as documented in Health Maintenance) for Dion meets the Care Plan requirement. This Care Plan has been established and reviewed with the Patient.    Counseling Resources:  ATP IV Guidelines  Pooled Cohorts Equation Calculator  Breast Cancer Risk Calculator  Breast Cancer: Medication to Reduce Risk  FRAX Risk Assessment  ICSI Preventive Guidelines  Dietary Guidelines for Americans, 2010  [employer]'s MyPlate  ASA Prophylaxis  Lung CA Screening    Chance Guzmán MD, MD  [facility]    Identified Health Risks:  "

## 2022-04-04 NOTE — PATIENT INSTRUCTIONS
Patient Education   Personalized Prevention Plan  You are due for the preventive services outlined below.  Your care team is available to assist you in scheduling these services.  If you have already completed any of these items, please share that information with your care team to update in your medical record.  Health Maintenance Due   Topic Date Due     ANNUAL REVIEW OF HM ORDERS  Never done     HIV Screening  Never done     FALL RISK ASSESSMENT  Never done     Pneumococcal Vaccine (1 of 1 - PPSV23) Never done     Annual Wellness Visit  02/08/2022     Basic Metabolic Panel  02/08/2022

## 2022-04-17 ENCOUNTER — HEALTH MAINTENANCE LETTER (OUTPATIENT)
Age: 67
End: 2022-04-17

## 2022-06-22 ENCOUNTER — MYC MEDICAL ADVICE (OUTPATIENT)
Dept: FAMILY MEDICINE | Facility: CLINIC | Age: 67
End: 2022-06-22

## 2022-06-23 NOTE — TELEPHONE ENCOUNTER
See RN response to patient's mychart message re:possible hernia    TANMAY DhaliwalN RN  [facility]

## 2022-06-27 ENCOUNTER — OFFICE VISIT (OUTPATIENT)
Dept: FAMILY MEDICINE | Facility: CLINIC | Age: 67
End: 2022-06-27
Payer: COMMERCIAL

## 2022-06-27 VITALS
TEMPERATURE: 99.7 F | RESPIRATION RATE: 15 BRPM | BODY MASS INDEX: 27.72 KG/M2 | WEIGHT: 198 LBS | OXYGEN SATURATION: 98 % | HEIGHT: 71 IN | DIASTOLIC BLOOD PRESSURE: 70 MMHG | SYSTOLIC BLOOD PRESSURE: 130 MMHG | HEART RATE: 84 BPM

## 2022-06-27 DIAGNOSIS — K40.90 RIGHT INGUINAL HERNIA: Primary | ICD-10-CM

## 2022-06-27 PROCEDURE — 99213 OFFICE O/P EST LOW 20 MIN: CPT | Performed by: FAMILY MEDICINE

## 2022-06-27 NOTE — TELEPHONE ENCOUNTER
Writer called patient:  1. Possible right inguinal hernia    Writer offered appt this AM with Dr. Ortega.  Patient verbalized understanding and in agreement with plan.    Appt date, time and location confirmed with patient.    TANMAY ZarateN, RN  [facility]

## 2022-06-27 NOTE — PROGRESS NOTES
"  Assessment & Plan     Right inguinal hernia  Discussed rare but serious potential risk for strangulation and need to seek medical care for a nonreducible hernia.    - Adult General Surg Referral      No follow-ups on file.    Toña Ortega MD  [facility]        Luis Ashley is a 66 year old, presenting for the following health issues:  Hernia      History of Present Illness       Reason for visit:  Hernia  Symptom onset:  3-4 weeks ago    He eats 2-3 servings of fruits and vegetables daily.He consumes 0 sweetened beverage(s) daily.He exercises with enough effort to increase his heart rate 30 to 60 minutes per day.  He exercises with enough effort to increase his heart rate 7 days per week.   He is taking medications regularly.     Musculoskeletal problem/pain      Duration: 3 weeks     Description  Location:  Right groin     Intensity:  mild    Accompanying signs and symptoms: bulging     History  Previous similar problem: YES- 14 years same side   Previous evaluation:  none    Precipitating or alleviating factors:  Trauma or overuse: no   Aggravating factors include: none    Therapies tried and outcome: nothing     He has a history of left inguinal hernia - repaired in 2006.    Review of Systems   as above       Objective    BP (!) 146/87 (BP Location: Right arm, Patient Position: Chair, Cuff Size: Adult Large)   Pulse 83   Temp 99.7  F (37.6  C) (Temporal)   Resp 15   Ht 1.803 m (5' 11\")   Wt 89.8 kg (198 lb)   SpO2 98%   BMI 27.62 kg/m    Body mass index is 27.62 kg/m .  Physical Exam   GEN:  no apparent distress  ABD:  soft, nontender, no mass, no hepatosplenomegaly, no ventral hernias, he does have a direct inguinal hernia on the right.                .  ..  "
No abnormalities noted

## 2022-06-27 NOTE — PATIENT INSTRUCTIONS
If you have MyChart:  1) I kindly request that you check your MyChart prior to all appointments with me and complete any assigned questionnaires ahead of time.    2) You may receive auto-released results from our system before I have the opportunity to review and comment.  Be assured I will review and comment on all of your results as soon as I can.        FYI:  1) I do virtual (video) visits exclusively on Wednesdays.  I still do in-person visits at [facility] ([phone]) on Mondays, Tuesdays and Thursdays.  You can schedule a video visit for many conditions.  Please follow this link:  [email or web address]  2) My schedule has been booking out very far in advance (2 months).  I apologize for the lack of timely access.  If you need to be seen for a chronic condition or preventive (wellness) visit, please be sure to schedule that appointment 2-3 months in advance.  If you have a concern that you feel cannot wait until my next available appointment (such as a hospital follow-up or new symptom of concern) please ask to speak to one of the [place] nurses who may be able to access a sooner appointment.    I do ask that all patients who are taking chronic medications for conditions that I am managing schedule an in-person visit with me at least once a year.     To schedule any ordered imaging studies (including mammogram and/or DEXA scan) you can call [facility] Scheduling at [phone].

## 2022-06-30 ENCOUNTER — OFFICE VISIT (OUTPATIENT)
Dept: UROLOGY | Facility: CLINIC | Age: 67
End: 2022-06-30
Payer: COMMERCIAL

## 2022-06-30 VITALS
DIASTOLIC BLOOD PRESSURE: 82 MMHG | HEART RATE: 82 BPM | HEIGHT: 71 IN | WEIGHT: 198 LBS | BODY MASS INDEX: 27.72 KG/M2 | SYSTOLIC BLOOD PRESSURE: 130 MMHG | OXYGEN SATURATION: 98 %

## 2022-06-30 DIAGNOSIS — C61 PROSTATE CANCER (H): Primary | ICD-10-CM

## 2022-06-30 LAB — PSA SERPL-MCNC: <0.04 UG/L (ref 0–4)

## 2022-06-30 PROCEDURE — 99213 OFFICE O/P EST LOW 20 MIN: CPT | Performed by: UROLOGY

## 2022-06-30 PROCEDURE — 84153 ASSAY OF PSA TOTAL: CPT | Performed by: UROLOGY

## 2022-06-30 PROCEDURE — 36415 COLL VENOUS BLD VENIPUNCTURE: CPT | Performed by: UROLOGY

## 2022-06-30 ASSESSMENT — PAIN SCALES - GENERAL: PAINLEVEL: NO PAIN (0)

## 2022-06-30 NOTE — PROGRESS NOTES
"  CHIEF COMPLAINT   It was my pleasure to see Dion Bowman who is a 66 year old male for follow-up of Prostate Cancer.      HPI  Dion Bowman is a very pleasant 66 year old male who presents with a history of Prostate Cancer.  Had RALP completed 4/29/2020. Deb 4+3 disease, pT2. Doing well. Excellent return of continence. PSA jerod to 0.14 6/2021. Salvage radiation completed 9/2021 and tolerated this very well.  PSA now undetectable. Good return of continence and overall doing well.     PSA  6/30/2022 - <0.04  12/9/2021 - <0.04  6/18/2021 - 0.14  3/9/2021 - 0.05  12/18/2020 - 0.04  9/4/2020 - <0.04     RALP 4/29/2020  FINAL DIAGNOSIS:   A: Prostate and bilateral seminal vesicles: Robotic assisted Radical   prostatectomy:   - Prostatic adenocarcinoma, mixed acinar and ductal types, Reading score   4+3 = 7, grade group 3, involving   approximately 20% of total prostatic volume   - Tumor is confined to the prostate   - Resection margins negative for carcinoma   - Benign bilateral seminal vesicles   - See synoptic report for details     B: Lymph nodes, bilateral pelvic: Dissection:   - Nine lymph nodes, negative for metastatic carcinoma (0/9)     PHYSICAL EXAM  Patient is a 66 year old  male   Vitals: Blood pressure 130/82, pulse 82, height 1.803 m (5' 11\"), weight 89.8 kg (198 lb), SpO2 98 %.  General Appearance Adult: Body mass index is 27.62 kg/m .  Alert, no acute distress, oriented  Lungs: no respiratory distress, or pursed lip breathing  Abdomen: soft, nontender, no organomegaly or masses  Back: no CVAT  Neuro: Alert, oriented, speech and mentation normal  Psych: affect and mood normal    Outside and Past Medical records:  Review of the result(s) of each unique test - PSA     ASSESSMENT and PLAN  66 year old male with stage pT2N0, Deb 4+3=7 prostate cancer, s/p RALP completed 4/29/2020. Negative margins. His PSA jerod to 0.14 and he completed salvage radiotherapy 9/2021. Tolerated this well. No changes in " urinary control. PSA continues to be undetectable with no evidence of recurrence.     - Follow-up 6 months with PSA    15 minutes spent on the date of the encounter doing chart review, history and exam, documentation and further activities as noted above.    Brady Flannery MD  Urology  [facility]

## 2022-07-01 ENCOUNTER — OFFICE VISIT (OUTPATIENT)
Dept: SURGERY | Facility: CLINIC | Age: 67
End: 2022-07-01
Attending: FAMILY MEDICINE
Payer: COMMERCIAL

## 2022-07-01 VITALS
DIASTOLIC BLOOD PRESSURE: 81 MMHG | WEIGHT: 198.1 LBS | HEART RATE: 74 BPM | BODY MASS INDEX: 27.73 KG/M2 | OXYGEN SATURATION: 99 % | HEIGHT: 71 IN | SYSTOLIC BLOOD PRESSURE: 145 MMHG

## 2022-07-01 DIAGNOSIS — K40.90 RIGHT INGUINAL HERNIA: ICD-10-CM

## 2022-07-01 PROCEDURE — 99203 OFFICE O/P NEW LOW 30 MIN: CPT | Performed by: SURGERY

## 2022-07-01 ASSESSMENT — ENCOUNTER SYMPTOMS
RECTAL BLEEDING: 0
ABDOMINAL PAIN: 0
BLOATING: 0
HYPERTENSION: 0
POLYPHAGIA: 0
SPEECH CHANGE: 0
SEIZURES: 0
LEG PAIN: 0
TASTE DISTURBANCE: 0
SNORES LOUDLY: 0
CHILLS: 0
SORE THROAT: 0
TACHYCARDIA: 0
SLEEP DISTURBANCES DUE TO BREATHING: 0
NERVOUS/ANXIOUS: 0
PANIC: 0
WEIGHT LOSS: 0
INCREASED ENERGY: 0
FEVER: 0
SHORTNESS OF BREATH: 0
ARTHRALGIAS: 0
SMELL DISTURBANCE: 0
DIARRHEA: 0
SWOLLEN GLANDS: 0
POSTURAL DYSPNEA: 0
NIGHT SWEATS: 0
NAUSEA: 0
EXERCISE INTOLERANCE: 0
SPUTUM PRODUCTION: 0
TREMORS: 0
HOARSE VOICE: 0
DEPRESSION: 0
HEMATURIA: 0
NUMBNESS: 0
SKIN CHANGES: 0
JOINT SWELLING: 0
NECK MASS: 0
DOUBLE VISION: 0
WEIGHT GAIN: 0
MUSCLE CRAMPS: 0
CONSTIPATION: 0
RESPIRATORY PAIN: 0
DISTURBANCES IN COORDINATION: 0
BACK PAIN: 0
HYPOTENSION: 0
EYE WATERING: 0
TINGLING: 0
FATIGUE: 0
DIZZINESS: 0
BRUISES/BLEEDS EASILY: 0
POLYDIPSIA: 0
EYE IRRITATION: 0
SINUS PAIN: 0
MUSCLE WEAKNESS: 0
TROUBLE SWALLOWING: 0
BLOOD IN STOOL: 0
LOSS OF CONSCIOUSNESS: 0
BOWEL INCONTINENCE: 0
WHEEZING: 0
NECK PAIN: 0
RECTAL PAIN: 0
FLANK PAIN: 0
JAUNDICE: 0
VOMITING: 0
SINUS CONGESTION: 0
HEARTBURN: 0
POOR WOUND HEALING: 0
MYALGIAS: 0
DYSPNEA ON EXERTION: 0
SYNCOPE: 0
ORTHOPNEA: 0
WEAKNESS: 0
EXTREMITY NUMBNESS: 0
ALTERED TEMPERATURE REGULATION: 0
DECREASED APPETITE: 0
LIGHT-HEADEDNESS: 0
DECREASED CONCENTRATION: 0
NAIL CHANGES: 0
HEADACHES: 0
COUGH DISTURBING SLEEP: 0
MEMORY LOSS: 0
DYSURIA: 0
DIFFICULTY URINATING: 0
EYE REDNESS: 0
CLAUDICATION: 0
COUGH: 0
LEG SWELLING: 0
PALPITATIONS: 0
PARALYSIS: 0
STIFFNESS: 0
INSOMNIA: 0
HALLUCINATIONS: 0
EYE PAIN: 0
HEMOPTYSIS: 0

## 2022-07-01 ASSESSMENT — PAIN SCALES - GENERAL: PAINLEVEL: NO PAIN (0)

## 2022-07-01 NOTE — LETTER
7/1/2022       RE: Dion oBwman  [address]  [facility] [address]     Dear Colleague,    Thank you for referring your patient, Dion Bowman, to the [facility] [place] [facility]. Please see a copy of my visit note below.    New Hernia Consultation Note      Dion Bowman  [id number]  1955    Requesting Provider: Toña Ortega    Dear Chance Guzmán,    I was asked by Toña Ortega to see this patient for the following problem: Dion Bowman is a 66 year old male who presents to clinic today for the following health issues       CHIEF COMPLAINT:  Chief Complaint Reviewed With Patient 6/28/2022   I am here today to be seen for: Hernia (Unsure What Type)         ASSESSMENT/PLAN:  inguinal  Hernia size is less than 5cm in size.    Assessment & Plan   Problem List Items Addressed This Visit    None     Visit Diagnoses     Right inguinal hernia        Relevant Orders    PAC Visit Referral (For [facility] Only)    Asymptomatic COVID-19 Virus (Coronavirus) by PCR    Case Request: HERNIORRHAPHY, INGUINAL, OPEN (Completed)       Due to history of prostatectomy I have recommended open R repair. He will call Jeffery to schedule.    Bud Morales MD          30 minutes spent on the date of the encounter doing chart review, history and exam, documentation and further activities per the note    HISTORY OF PRESENT ILLNESS:  Location: right inguinal  Severity: Moderate     History of L inguinal LISHA and robotic prostatectomy.      Timing of Hernia Reviewed With Patient 6/28/2022   The onset of my hernia symptoms was: Gradual   My symptoms are: Constant   My symptoms have been: Worsening       Duration Reviewed With Patient 6/28/2022   My symptoms began: Month ago or so       Modifying Factor Questions Reviewed With Patient 6/28/2022   My hernia symptoms improve with: Laying down or walking   My hernia symptoms worsen with: Sneezing or  Neuro consult completed. Dictated note to follow. Pt is a 67yo male with reported AMS, no one at bedside to confirm baseline. Pt has left facial droop, dysconjugate gaze with nystagmus in left eye with right lateral gaze, right UE neglect, sym UE reflexes, absent LE, toes down. MRI brain to assess for stroke. Continue home ASA 325mg daily. HgbA1C is pending. UDS added. coughing       Associated Signs and Symptoms Reviewed With Patient 6/28/2022   I have the following complaints/concerns related to my hernia: Abdominal Bulge or Protusion       [facility] SUR[facility]ERY-HERNIA HISTORY 6/28/2022   My hernia has been repaired with mesh in the past? No       Patient Supplied Answers To HerQLes Assessment Questionnaire  No flowsheet data found.  _______________________________________________________________________            NUTRITIONAL STATUS:  Lab Results   Component Value Date    ALBUMIN 3.8 04/04/2022    ALBUMIN 3.9 02/08/2021       Body mass index is 27.63 kg/m .    Patient is not immunosuppressed.    Patient is not a current smoker.    Past Medical History:   Diagnosis Date     Cancer (H) 2020    prostate     CARDIOVASCULAR SCREENING; LDL GOAL LESS THAN 130 5/9/2010     Hypertension goal BP (blood pressure) < 140/90 12/16/2010     Impaired fasting blood sugar 12/21/2010       Patient Active Problem List   Diagnosis     Uncontrolled hypertension     Hyperlipidemia LDL goal <130     Impaired fasting blood sugar     Prostate cancer (H)       Past Surgical History:   Procedure Laterality Date     BIOPSY  2020    prostate     COLONOSCOPY  03/09/2009    Negative - Due in 2019     COLONOSCOPY N/A 03/09/2021    Procedure: COLONOSCOPY, WITH POLYPECTOMY;  Surgeon: Lani Marie MD;  [place] [facility] [place]     DAVINCI PROSTATECTOMY, LYMPHADENECTOMY N/A 04/29/2020    Procedure: ROBOTIC ASSISTED PROSTATECTOMY, WITH BILATERAL PELVIC LYMPHADENECTOMY;  Surgeon: Brady Flannery MD;  Location:  OR     HERNIA REPAIR, INGUINAL RT/LT  01/26/2006    Left Inguinal Hernia       MEDICATIONS:  Current Outpatient Medications   Medication     amLODIPine (NORVASC) 10 MG tablet     atenolol (TENORMIN) 100 MG tablet     FISH OIL 1200 MG OR CAPS     lisinopril-hydrochlorothiazide (ZESTORETIC) 20-12.5 MG tablet     sildenafil (REVATIO) 20 MG tablet     VITAMIN D, CHOLECALCIFEROL, PO     No current  facility-administered medications for this visit.       ALLERGIES:  Allergies   Allergen Reactions     No Known Drug Allergies        Social History     Socioeconomic History     Marital status:      Spouse name: Celi     Number of children: 2     Years of education: 16     Highest education level: None   Occupational History     Occupation:      Comment: [employer]     Employer: [employer]   Tobacco Use     Smoking status: Never Smoker     Smokeless tobacco: Never Used   Substance and Sexual Activity     Alcohol use: Not Currently     Comment: 1 drink every two weeks. During Football Season     Drug use: No     Sexual activity: Yes     Partners: Female     Birth control/protection: None   Other Topics Concern      Service No     Blood Transfusions No     Caffeine Concern No     Comment: Ice Tea - 2 glasses a day     Occupational Exposure No     Hobby Hazards No     Sleep Concern No     Stress Concern No     Weight Concern No     Special Diet No     Back Care No     Exercise Yes     Comment: Stair Master, Ellipitical, weights - 6 days a week     Bike Helmet No     Seat Belt Yes     Self-Exams Yes     Parent/sibling w/ CABG, MI or angioplasty before 65F 55M? No   Social History Narrative    Balanced Diet - Yes    Osteoporosis Preventative measures-  Dairy servings per day: 2-3 servings daily    Regular Exercise -  Yes Describe stair master, walking, biking    Dental Exam up - YES - Date: 2007    Eye Exam - YES - Date: 2005    Self Testicular Exam -  No    Do you have any concerns about STD's -  No    Abuse: Current or Past (Physical, Sexual or Emotional)- No    Do you feel safe in your environment - Yes    Guns stored in the home - No    Sunscreen used - No    Seatbelts used - Yes    Lipids - YES - Date: 12-12-06    Glucose -  YES - Date: 12-12-06    Colon Cancer Screening - No    Hemoccults - NO    PSA - YES - Date: 12-12-06    Digital Rectal Exam - NO    Immunizations  reviewed and up to date - Yes, last TD was 9-14-04    Tiffanie Moralez MA       Family History   Problem Relation Age of Onset     Hypertension Mother      Alcohol/Drug Mother         alcohol     Alcohol/Drug Father         alcohol     Cancer Father         lung     Cancer Sister         lung       Review of Systems     Constitutional:  Negative for fever, chills, weight loss, weight gain, fatigue, decreased appetite, night sweats, recent stressors, height gain, height loss, post-operative complications, incisional pain, hallucinations, increased energy, hyperactivity and confused.   HENT:  Negative for ear pain, hearing loss, tinnitus, nosebleeds, trouble swallowing, hoarse voice, mouth sores, sore throat, ear discharge, tooth pain, gum tenderness, taste disturbance, smell disturbance, hearing aid, bleeding gums, dry mouth, sinus pain, sinus congestion and neck mass.    Eyes:  Negative for double vision, pain, redness, eye pain, decreased vision, eye watering, eye bulging, eye dryness, flashing lights, spots, floaters, strabismus, tunnel vision, jaundice and eye irritation.   Respiratory:   Negative for cough, hemoptysis, sputum production, shortness of breath, wheezing, sleep disturbances due to breathing, snores loudly, respiratory pain, dyspnea on exertion, cough disturbing sleep and postural dyspnea.    Cardiovascular:  Negative for chest pain, dyspnea on exertion, palpitations, orthopnea, claudication, leg swelling, fingers/toes turn blue, hypertension, hypotension, syncope, history of heart murmur, chest pain on exertion, chest pain at rest, pacemaker, few scattered varicosities, leg pain, sleep disturbances due to breathing, tachycardia, light-headedness, exercise intolerance and edema.   Gastrointestinal:  Negative for heartburn, nausea, vomiting, abdominal pain, diarrhea, constipation, blood in stool, melena, rectal pain, bloating, hemorrhoids, bowel incontinence, jaundice, rectal bleeding, coffee ground  "emesis and change in stool.   Genitourinary:  Negative for bladder incontinence, dysuria, urgency, hematuria, flank pain, difficulty urinating, nocturia, voiding less frequently, scrotal pain, ulcerations, penile discharge, male genitourinary complaint and reduced libido.   Musculoskeletal:  Negative for myalgias, back pain, joint swelling, arthralgias, stiffness, muscle cramps, neck pain, bone pain, muscle weakness and fracture.   Skin:  Negative for nail changes, itching, poor wound healing, rash, hair changes, skin changes, acne, warts, poor wound healing, scarring, flaky skin, Raynaud's phenomenon, sensitivity to sunlight and skin thickening.   Neurological:  Negative for dizziness, tingling, tremors, speech change, seizures, loss of consciousness, weakness, light-headedness, numbness, headaches, disturbances in coordination, extremity numbness, memory loss, difficulty walking and paralysis.   Endo/Heme:  Negative for anemia, swollen glands and bruises/bleeds easily.   Psychiatric/Behavioral:  Negative for depression, hallucinations, memory loss, decreased concentration, mood swings and panic attacks.    Endocrine:  Negative for altered temperature regulation, polyphagia, polydipsia, unwanted hair growth and change in facial hair.      n/a    PHYSICAL EXAM:  Objective    BP (!) 145/81 (BP Location: Left arm, Patient Position: Sitting, Cuff Size: Adult Regular)   Pulse 74   Ht 1.803 m (5' 11\")   Wt 89.9 kg (198 lb 1.6 oz)   SpO2 99%   BMI 27.63 kg/m    BP (!) 145/81 (BP Location: Left arm, Patient Position: Sitting, Cuff Size: Adult Regular)   Pulse 74   Ht 1.803 m (5' 11\")   Wt 89.9 kg (198 lb 1.6 oz)   SpO2 99%   BMI 27.63 kg/m    Body mass index is 27.63 kg/m .  Physical Exam  Musculoskeletal:         General: No edema.        +large R inguinal hernia          DISCUSSION OF RISKS:  I discussed the alternatives, benefits, risks and possible complications of hernia repair with the patient. The risks of " hernia surgery with and without mesh are described below.    Based on FDA s analysis of medical device adverse event reports and of peer-reviewed, scientific literature, the most common adverse events for all surgical repair of hernias--with or without mesh--are pain, infection, hernia recurrence, scar-like tissue that sticks tissues together (adhesion), blockage of the large or small intestine (obstruction), bleeding, abnormal connection between organs, vessels, or intestines (fistula), fluid build-up at the surgical site (seroma), and a hole in neighboring tissues or organs (perforation).  Some other potential adverse events that can occur following hernia repair with mesh are mesh migration and mesh shrinkage (contraction).    http://www.fda.gov/MedicalDevices/ProductsandMedicalProcedures/ImplantsandProsthetics/HerniaSurgicalMesh/default.htm        Sincerely,    Bud Morales MD

## 2022-07-01 NOTE — NURSING NOTE
"Chief Complaint   Patient presents with     New Patient     Right inguinal hernia        Vitals:    07/01/22 0723   BP: (!) 145/81   BP Location: Left arm   Patient Position: Sitting   Cuff Size: Adult Regular   Pulse: 74   SpO2: 99%   Weight: 89.9 kg (198 lb 1.6 oz)   Height: 1.803 m (5' 11\")       Body mass index is 27.63 kg/m .                          Jennifer Vasquez, EMT    "

## 2022-07-01 NOTE — PATIENT INSTRUCTIONS
You saw Dr Morales and were diagnosed with a Right inguinal hernia and were recommended to have an open repair. To schedule surgery please call Jeffery Ramírez at [phone]. She will also help you arrange a preop History and Physical  and a preop COVID test.

## 2022-07-01 NOTE — PROGRESS NOTES
New Hernia Consultation Note      Dion Bowman  [id number]  1955    Requesting Provider: Toña Ortega    Dear Chance Guzmán,    I was asked by Toña Ortega to see this patient for the following problem: Dion Bowman is a 66 year old male who presents to clinic today for the following health issues       CHIEF COMPLAINT:  Chief Complaint Reviewed With Patient 6/28/2022   I am here today to be seen for: Hernia (Unsure What Type)         ASSESSMENT/PLAN:  inguinal  Hernia size is less than 5cm in size.    Assessment & Plan   Problem List Items Addressed This Visit    None     Visit Diagnoses     Right inguinal hernia        Relevant Orders    PAC Visit Referral (For [facility] Only)    Asymptomatic COVID-19 Virus (Coronavirus) by PCR    Case Request: HERNIORRHAPHY, INGUINAL, OPEN (Completed)       Due to history of prostatectomy I have recommended open R repair. He will call Jeffery to schedule.    Bud Morales MD          30 minutes spent on the date of the encounter doing chart review, history and exam, documentation and further activities per the note    HISTORY OF PRESENT ILLNESS:  Location: right inguinal  Severity: Moderate     History of L inguinal LISHA and robotic prostatectomy.      Timing of Hernia Reviewed With Patient 6/28/2022   The onset of my hernia symptoms was: Gradual   My symptoms are: Constant   My symptoms have been: Worsening       Duration Reviewed With Patient 6/28/2022   My symptoms began: Month ago or so       Modifying Factor Questions Reviewed With Patient 6/28/2022   My hernia symptoms improve with: Laying down or walking   My hernia symptoms worsen with: Sneezing or coughing       Associated Signs and Symptoms Reviewed With Patient 6/28/2022   I have the following complaints/concerns related to my hernia: Abdominal Bulge or Protusion       [facility]ERY-[facility]ERN[facility] HISTORY 6/28/2022   My hernia has been repaired with mesh in the past? No       Patient Supplied Answers To HerQLes  Assessment Questionnaire  No flowsheet data found.  _______________________________________________________________________            NUTRITIONAL STATUS:  Lab Results   Component Value Date    ALBUMIN 3.8 04/04/2022    ALBUMIN 3.9 02/08/2021       Body mass index is 27.63 kg/m .    Patient is not immunosuppressed.    Patient is not a current smoker.    Past Medical History:   Diagnosis Date     Cancer (H) 2020    prostate     CARDIOVASCULAR SCREENING; LDL GOAL LESS THAN 130 5/9/2010     Hypertension goal BP (blood pressure) < 140/90 12/16/2010     Impaired fasting blood sugar 12/21/2010       Patient Active Problem List   Diagnosis     Uncontrolled hypertension     Hyperlipidemia LDL goal <130     Impaired fasting blood sugar     Prostate cancer (H)       Past Surgical History:   Procedure Laterality Date     BIOPSY  2020    prostate     COLONOSCOPY  03/09/2009    Negative - Due in 2019     COLONOSCOPY N/A 03/09/2021    Procedure: COLONOSCOPY, WITH POLYPECTOMY;  Surgeon: Lani Marie MD;  [place] [facility] [place]     DAVINCI PROSTATECTOMY, LYMPHADENECTOMY N/A 04/29/2020    Procedure: ROBOTIC ASSISTED PROSTATECTOMY, WITH BILATERAL PELVIC LYMPHADENECTOMY;  Surgeon: Brady Flannery MD;  Location:  OR     HERNIA REPAIR, INGUINAL RT/LT  01/26/2006    Left Inguinal Hernia       MEDICATIONS:  Current Outpatient Medications   Medication     amLODIPine (NORVASC) 10 MG tablet     atenolol (TENORMIN) 100 MG tablet     FISH OIL 1200 MG OR CAPS     lisinopril-hydrochlorothiazide (ZESTORETIC) 20-12.5 MG tablet     sildenafil (REVATIO) 20 MG tablet     VITAMIN D, CHOLECALCIFEROL, PO     No current facility-administered medications for this visit.       ALLERGIES:  Allergies   Allergen Reactions     No Known Drug Allergies        Social History     Socioeconomic History     Marital status:      Spouse name: Celi     Number of children: 2     Years of education: 16     Highest education level: None    Occupational History     Occupation:      Comment: [employer]     Employer: [employer]   Tobacco Use     Smoking status: Never Smoker     Smokeless tobacco: Never Used   Substance and Sexual Activity     Alcohol use: Not Currently     Comment: 1 drink every two weeks. During Football Season     Drug use: No     Sexual activity: Yes     Partners: Female     Birth control/protection: None   Other Topics Concern      Service No     Blood Transfusions No     Caffeine Concern No     Comment: Ice Tea - 2 glasses a day     Occupational Exposure No     Hobby Hazards No     Sleep Concern No     Stress Concern No     Weight Concern No     Special Diet No     Back Care No     Exercise Yes     Comment: Stair Master, Ellipitical, weights - 6 days a week     Bike Helmet No     Seat Belt Yes     Self-Exams Yes     Parent/sibling w/ CABG, MI or angioplasty before 65F 55M? No   Social History Narrative    Balanced Diet - Yes    Osteoporosis Preventative measures-  Dairy servings per day: 2-3 servings daily    Regular Exercise -  Yes Describe stair master, walking, biking    Dental Exam up - YES - Date: 2007    Eye Exam - YES - Date: 2005    Self Testicular Exam -  No    Do you have any concerns about STD's -  No    Abuse: Current or Past (Physical, Sexual or Emotional)- No    Do you feel safe in your environment - Yes    Guns stored in the home - No    Sunscreen used - No    Seatbelts used - Yes    Lipids - YES - Date: 12-12-06    Glucose -  YES - Date: 12-12-06    Colon Cancer Screening - No    Hemoccults - NO    PSA - YES - Date: 12-12-06    Digital Rectal Exam - NO    Immunizations reviewed and up to date - Yes, last TD was 9-14-04    Tiffanie Moralez MA       Family History   Problem Relation Age of Onset     Hypertension Mother      Alcohol/Drug Mother         alcohol     Alcohol/Drug Father         alcohol     Cancer Father         lung     Cancer Sister         lung       Review of Systems      Constitutional:  Negative for fever, chills, weight loss, weight gain, fatigue, decreased appetite, night sweats, recent stressors, height gain, height loss, post-operative complications, incisional pain, hallucinations, increased energy, hyperactivity and confused.   HENT:  Negative for ear pain, hearing loss, tinnitus, nosebleeds, trouble swallowing, hoarse voice, mouth sores, sore throat, ear discharge, tooth pain, gum tenderness, taste disturbance, smell disturbance, hearing aid, bleeding gums, dry mouth, sinus pain, sinus congestion and neck mass.    Eyes:  Negative for double vision, pain, redness, eye pain, decreased vision, eye watering, eye bulging, eye dryness, flashing lights, spots, floaters, strabismus, tunnel vision, jaundice and eye irritation.   Respiratory:   Negative for cough, hemoptysis, sputum production, shortness of breath, wheezing, sleep disturbances due to breathing, snores loudly, respiratory pain, dyspnea on exertion, cough disturbing sleep and postural dyspnea.    Cardiovascular:  Negative for chest pain, dyspnea on exertion, palpitations, orthopnea, claudication, leg swelling, fingers/toes turn blue, hypertension, hypotension, syncope, history of heart murmur, chest pain on exertion, chest pain at rest, pacemaker, few scattered varicosities, leg pain, sleep disturbances due to breathing, tachycardia, light-headedness, exercise intolerance and edema.   Gastrointestinal:  Negative for heartburn, nausea, vomiting, abdominal pain, diarrhea, constipation, blood in stool, melena, rectal pain, bloating, hemorrhoids, bowel incontinence, jaundice, rectal bleeding, coffee ground emesis and change in stool.   Genitourinary:  Negative for bladder incontinence, dysuria, urgency, hematuria, flank pain, difficulty urinating, nocturia, voiding less frequently, scrotal pain, ulcerations, penile discharge, male genitourinary complaint and reduced libido.   Musculoskeletal:  Negative for myalgias,  "back pain, joint swelling, arthralgias, stiffness, muscle cramps, neck pain, bone pain, muscle weakness and fracture.   Skin:  Negative for nail changes, itching, poor wound healing, rash, hair changes, skin changes, acne, warts, poor wound healing, scarring, flaky skin, Raynaud's phenomenon, sensitivity to sunlight and skin thickening.   Neurological:  Negative for dizziness, tingling, tremors, speech change, seizures, loss of consciousness, weakness, light-headedness, numbness, headaches, disturbances in coordination, extremity numbness, memory loss, difficulty walking and paralysis.   Endo/Heme:  Negative for anemia, swollen glands and bruises/bleeds easily.   Psychiatric/Behavioral:  Negative for depression, hallucinations, memory loss, decreased concentration, mood swings and panic attacks.    Endocrine:  Negative for altered temperature regulation, polyphagia, polydipsia, unwanted hair growth and change in facial hair.      n/a    PHYSICAL EXAM:  Objective    BP (!) 145/81 (BP Location: Left arm, Patient Position: Sitting, Cuff Size: Adult Regular)   Pulse 74   Ht 1.803 m (5' 11\")   Wt 89.9 kg (198 lb 1.6 oz)   SpO2 99%   BMI 27.63 kg/m    BP (!) 145/81 (BP Location: Left arm, Patient Position: Sitting, Cuff Size: Adult Regular)   Pulse 74   Ht 1.803 m (5' 11\")   Wt 89.9 kg (198 lb 1.6 oz)   SpO2 99%   BMI 27.63 kg/m    Body mass index is 27.63 kg/m .  Physical Exam  Musculoskeletal:         General: No edema.        +large R inguinal hernia          DISCUSSION OF RISKS:  I discussed the alternatives, benefits, risks and possible complications of hernia repair with the patient. The risks of hernia surgery with and without mesh are described below.    Based on FDA s analysis of medical device adverse event reports and of peer-reviewed, scientific literature, the most common adverse events for all surgical repair of hernias--with or without mesh--are pain, infection, hernia recurrence, scar-like tissue " that sticks tissues together (adhesion), blockage of the large or small intestine (obstruction), bleeding, abnormal connection between organs, vessels, or intestines (fistula), fluid build-up at the surgical site (seroma), and a hole in neighboring tissues or organs (perforation).  Some other potential adverse events that can occur following hernia repair with mesh are mesh migration and mesh shrinkage (contraction).    http://www.fda.gov/MedicalDevices/ProductsandMedicalProcedures/ImplantsandProsthetics/HerniaSurgicalMesh/default.htm        Sincerely,    Bud Morales MD

## 2022-07-11 ENCOUNTER — TELEPHONE (OUTPATIENT)
Dept: SURGERY | Facility: CLINIC | Age: 67
End: 2022-07-11

## 2022-07-11 PROBLEM — K40.90 RIGHT INGUINAL HERNIA: Status: ACTIVE | Noted: 2022-07-11

## 2022-07-11 NOTE — TELEPHONE ENCOUNTER
Left VM message asking patient to call me to discuss a surgery date for an open right inguinal hernia repair with Dr. Bud Morales. My direct call back number left.

## 2022-07-11 NOTE — TELEPHONE ENCOUNTER
Patient called back to discuss a surgery date for open right inguinal hernia repair with Dr. Bud Morales. Scheduled 8/16 at the [facility], to arrive 90 minutes prior to case start time, 5th floor [address] [place] [address]. Patient will have his pre-op with his primary care.   Discussed will need to do an at-home COVID-19 test on 8/14 and bring the result with him when he comes for surgery.

## 2022-08-09 ENCOUNTER — OFFICE VISIT (OUTPATIENT)
Dept: FAMILY MEDICINE | Facility: CLINIC | Age: 67
End: 2022-08-09
Payer: COMMERCIAL

## 2022-08-09 VITALS
BODY MASS INDEX: 27.72 KG/M2 | DIASTOLIC BLOOD PRESSURE: 88 MMHG | OXYGEN SATURATION: 98 % | HEART RATE: 86 BPM | RESPIRATION RATE: 16 BRPM | WEIGHT: 198 LBS | TEMPERATURE: 97.9 F | SYSTOLIC BLOOD PRESSURE: 138 MMHG | HEIGHT: 71 IN

## 2022-08-09 DIAGNOSIS — Z01.818 PREOP GENERAL PHYSICAL EXAM: Primary | ICD-10-CM

## 2022-08-09 DIAGNOSIS — I10 BENIGN ESSENTIAL HTN: ICD-10-CM

## 2022-08-09 DIAGNOSIS — K40.90 RIGHT INGUINAL HERNIA: ICD-10-CM

## 2022-08-09 PROCEDURE — 99214 OFFICE O/P EST MOD 30 MIN: CPT | Performed by: FAMILY MEDICINE

## 2022-08-09 NOTE — PROGRESS NOTES
[facility] [address] [place] [address]  Phone: [phone]  Primary Provider: Chance Guzmán  Pre-op Performing Provider: CHANCE GUZMÁN    PREOPERATIVE EVALUATION:  Today's date: 8/9/2022    Dion Bowman is a 67 year old male who presents for a preoperative evaluation.    Surgical Information:  Surgery/Procedure: HERNIORRHAPHY, INGUINAL, OPEN RIGHT  [place] [facility] [place]  Surgeon: Bud Morales MD  Surgery Date: 08/30/2022  Time of Surgery: 9:20am  Where patient plans to recover: At home with family  Fax number for surgical facility: Note does not need to be faxed, will be available electronically in Epic.    Type of Anesthesia Anticipated: General    Assessment & Plan     The proposed surgical procedure is considered INTERMEDIATE risk.    Preop general physical exam       Right inguinal hernia   open hernia repair.    Benign essential HTN  bp tends to run on higher side but it is at goal. Stick to same rx for now.          Risks and Recommendations:  The patient has the following additional risks and recommendations for perioperative complications:   - No identified additional risk factors other than previously addressed    Medication Instructions:  Patient is to take all scheduled medications on the day of surgery    RECOMMENDATION:  APPROVAL GIVEN to proceed with proposed procedure, without further diagnostic evaluation.      Subjective     HPI related to upcoming procedure: will be having open hernia surgery.     Had hernia surgery on left side in the past.     No allergy to latex.     Preop Questions 8/2/2022   1. Have you ever had a heart attack or stroke? No   2. Have you ever had surgery on your heart or blood vessels, such as a stent placement, a coronary artery bypass, or surgery on an artery in your head, neck, heart, or legs? No   3. Do you have chest pain with activity? No   4. Do you have a history of  heart failure? No   5. Do  you currently have a cold, bronchitis or symptoms of other infection? No   6. Do you have a cough, shortness of breath, or wheezing? No   7. Do you or anyone in your family have previous history of blood clots? No   8. Do you or does anyone in your family have a serious bleeding problem such as prolonged bleeding following surgeries or cuts? No   9. Have you ever had problems with anemia or been told to take iron pills? No   10. Have you had any abnormal blood loss such as black, tarry or bloody stools? No   11. Have you ever had a blood transfusion? No   12. Are you willing to have a blood transfusion if it is medically needed before, during, or after your surgery? Yes   13. Have you or any of your relatives ever had problems with anesthesia? No   14. Do you have sleep apnea, excessive snoring or daytime drowsiness? No   15. Do you have any artifical heart valves or other implanted medical devices like a pacemaker, defibrillator, or continuous glucose monitor? No   16. Do you have artificial joints? No   17. Are you allergic to latex? No     Health Care Directive:  Patient does not have a Health Care Directive or Living Will: Discussed advance care planning with patient; information given to patient to review.    Preoperative Review of :   reviewed - no record of controlled substances prescribed.         Review of Systems  Constitutional, neuro, ENT, endocrine, pulmonary, cardiac, gastrointestinal, genitourinary, musculoskeletal, integument and psychiatric systems are negative, except as otherwise noted.    Patient Active Problem List    Diagnosis Date Noted     Right inguinal hernia 07/11/2022     Priority: Medium     Added automatically from request for surgery [id number]       Prostate cancer (H) 03/20/2020     Priority: Medium     Impaired fasting blood sugar 12/21/2010     Priority: Medium     Uncontrolled hypertension 12/16/2010     Priority: Medium     Hyperlipidemia LDL goal <130 05/09/2010     Priority:  "Medium      Past Medical History:   Diagnosis Date     Cancer (H) 2020    prostate     CARDIOVASCULAR SCREENING; LDL GOAL LESS THAN 130 5/9/2010     Hypertension goal BP (blood pressure) < 140/90 12/16/2010     Impaired fasting blood sugar 12/21/2010     Past Surgical History:   Procedure Laterality Date     BIOPSY  2020    prostate     COLONOSCOPY  03/09/2009    Negative - Due in 2019     COLONOSCOPY N/A 03/09/2021    Procedure: COLONOSCOPY, WITH POLYPECTOMY;  Surgeon: Lani Marie MD;  Location: [facility] OR     DAVINCI PROSTATECTOMY, LYMPHADENECTOMY N/A 04/29/2020    Procedure: ROBOTIC ASSISTED PROSTATECTOMY, WITH BILATERAL PELVIC LYMPHADENECTOMY;  Surgeon: Brady Flannery MD;  Location: [facility] OR     HERNIA REPAIR, INGUINAL RT/LT  01/26/2006    Left Inguinal Hernia     Current Outpatient Medications   Medication Sig Dispense Refill     amLODIPine (NORVASC) 10 MG tablet Take 1 tablet (10 mg) by mouth daily 90 tablet 2     atenolol (TENORMIN) 100 MG tablet Take 1 tablet (100 mg) by mouth daily 90 tablet 2     FISH OIL 1200 MG OR CAPS Take 2 capsules by mouth every other day        lisinopril-hydrochlorothiazide (ZESTORETIC) 20-12.5 MG tablet TAKE 2 TABLETS BY MOUTH EVERY MORNING 180 tablet 3     sildenafil (REVATIO) 20 MG tablet Take 1 tablet (20 mg) by mouth daily as needed (sexual activity) 30 tablet 3     VITAMIN D, CHOLECALCIFEROL, PO Take 1,000 Units by mouth daily       Allergies   Allergen Reactions     No Known Drug Allergies         Social History     Tobacco Use     Smoking status: Never Smoker     Smokeless tobacco: Never Used   Substance Use Topics     Alcohol use: Not Currently     Comment: 1 drink every two weeks. During Football Season     History   Drug Use No         Objective     /88 (BP Location: Right arm, Patient Position: Sitting, Cuff Size: Adult Large)   Pulse 86   Temp 97.9  F (36.6  C) (Temporal)   Resp 16   Ht 1.797 m (5' 10.75\")   Wt 89.8 kg (198 lb)   SpO2 98%   " BMI 27.81 kg/m      Physical Exam    GENERAL APPEARANCE: healthy, alert and no distress     EYES: EOMI,  PERRL     HENT: ear canals and TM's normal and nose and mouth without ulcers or lesions     NECK: no adenopathy, no asymmetry, masses, or scars and thyroid normal to palpation     RESP: lungs clear to auscultation - no rales, rhonchi or wheezes     CV: regular rates and rhythm, normal S1 S2, no S3 or S4 and no murmur, click or rub     MS: extremities normal- no gross deformities noted, no evidence of inflammation in joints, FROM in all extremities.     SKIN: no suspicious lesions or rashes     NEURO: Normal strength and tone, sensory exam grossly normal, mentation intact and speech normal     PSYCH: mentation appears normal. and affect normal/bright     LYMPHATICS: No cervical adenopathy    Recent Labs   Lab Test 04/04/22  0913 02/08/21  0903    133   POTASSIUM 3.8 4.2   CR 1.00 1.01   A1C  --  5.0      Diagnostics:  No labs were ordered during this visit.   No EKG required, no history of coronary heart disease, significant arrhythmia, peripheral arterial disease or other structural heart disease.    Revised Cardiac Risk Index (RCRI):  The patient has the following serious cardiovascular risks for perioperative complications:   - No serious cardiac risks = 0 points     RCRI Interpretation: 0 points: Class I (very low risk - 0.4% complication rate)           Signed Electronically by: Chance Guzmán MD, MD  Copy of this evaluation report is provided to requesting physician.

## 2022-08-09 NOTE — PATIENT INSTRUCTIONS
Preparing for Your Surgery  Getting started  A nurse will call you to review your health history and instructions. They will give you an arrival time based on your scheduled surgery time. Please be ready to share:    Your doctor's clinic name and phone number    Your medical, surgical and anesthesia history    A list of allergies and sensitivities    A list of medicines, including herbal treatments and over-the-counter drugs    Whether the patient has a legal guardian (ask how to send us the papers in advance)  Please tell us if you're pregnant--or if there's any chance you might be pregnant. Some surgeries may injure a fetus (unborn baby), so they require a pregnancy test. Surgeries that are safe for a fetus don't always need a test, and you can choose whether to have one.   If you have a child who's having surgery, please ask for a copy of Preparing for Your Child's Surgery.    Preparing for surgery    Within 30 days of surgery: Have a pre-op exam (sometimes called an H&P, or History and Physical). This can be done at a clinic or pre-operative center.  ? If you're having a , you may not need this exam. Talk to your care team.    At your pre-op exam, talk to your care team about all medicines you take. If you need to stop any medicines before surgery, ask when to start taking them again.  ? We do this for your safety. Many medicines can make you bleed too much during surgery. Some change how well surgery (anesthesia) drugs work.    Call your insurance company to let them know you're having surgery. (If you don't have insurance, call [phone].)    Call your clinic if there's any change in your health. This includes signs of a cold or flu (sore throat, runny nose, cough, rash, fever). It also includes a scrape or scratch near the surgery site.    If you have questions on the day of surgery, call your hospital or surgery center.  COVID testing  You may need to be tested for COVID-19 before having  surgery. If so, we will give you instructions.  Eating and drinking guidelines  For your safety: Unless your surgeon tells you otherwise, follow the guidelines below.    Eat and drink as usual until 8 hours before surgery. After that, no food or milk.    Drink clear liquids until 2 hours before surgery. These are liquids you can see through, like water, Gatorade and Propel Water. You may also have black coffee and tea (no cream or milk).    Nothing by mouth within 2 hours of surgery. This includes gum, candy and breath mints.    If you drink alcohol: Stop drinking it the night before surgery.    If your care team tells you to take medicine on the morning of surgery, it's okay to take it with a sip of water.  Preventing infection    Shower or bathe the night before and morning of your surgery. Follow the instructions your clinic gave you. (If no instructions, use regular soap.)    Don't shave or clip hair near your surgery site. We'll remove the hair if needed.    Don't smoke or vape the morning of surgery. You may chew nicotine gum up to 2 hours before surgery. A nicotine patch is okay.  ? Note: Some surgeries require you to completely quit smoking and nicotine. Check with your surgeon.    Your care team will make every effort to keep you safe from infection. We will:  ? Clean our hands often with soap and water (or an alcohol-based hand rub).  ? Clean the skin at your surgery site with a special soap that kills germs.  ? Give you a special gown to keep you warm. (Cold raises the risk of infection.)  ? Wear special hair covers, masks, gowns and gloves during surgery.  ? Give antibiotic medicine, if prescribed. Not all surgeries need antibiotics.  What to bring on the day of surgery    Photo ID and insurance card    Copy of your health care directive, if you have one    Glasses and hearing aides (bring cases)  ? You can't wear contacts during surgery    Inhaler and eye drops, if you use them (tell us about these when  you arrive)    CPAP machine or breathing device, if you use them    A few personal items, if spending the night    If you have . . .  ? A pacemaker, ICD (cardiac defibrillator) or other implant: Bring the ID card.  ? An implanted stimulator: Bring the remote control.  ? A legal guardian: Bring a copy of the certified (court-stamped) guardianship papers.  Please remove any jewelry, including body piercings. Leave jewelry and other valuables at home.  If you're going home the day of surgery    You must have a responsible adult drive you home. They should stay with you overnight as well.    If you don't have someone to stay with you, and you aren't safe to go home alone, we may keep you overnight. Insurance often won't pay for this.  After surgery  If it's hard to control your pain or you need more pain medicine, please call your surgeon's office.  Questions?   If you have any questions for your care team, list them here: _________________________________________________________________________________________________________________________________________________________________________ ____________________________________ ____________________________________ ____________________________________  For informational purposes only. Not to replace the advice of your health care provider. Copyright   2003, 2019 [facility]. All rights reserved. Clinically reviewed by Juana Peters MD. [employer] [id number] - REV 07/21.

## 2022-08-09 NOTE — H&P (VIEW-ONLY)
[facility] [address] [place] [address]  Phone: [phone]  Primary Provider: Chance Guzmán  Pre-op Performing Provider: CHANCE GUZMÁN    PREOPERATIVE EVALUATION:  Today's date: 8/9/2022    Dion Bowman is a 67 year old male who presents for a preoperative evaluation.    Surgical Information:  Surgery/Procedure: HERNIORRHAPHY, INGUINAL, OPEN RIGHT  Surgery Location: [facility] [place]  Surgeon: Bud Morales MD  Surgery Date: 08/30/2022  Time of Surgery: 9:20am  Where patient plans to recover: At home with family  Fax number for surgical facility: Note does not need to be faxed, will be available electronically in Epic.    Type of Anesthesia Anticipated: General    Assessment & Plan     The proposed surgical procedure is considered INTERMEDIATE risk.    Preop general physical exam       Right inguinal hernia   open hernia repair.    Benign essential HTN  bp tends to run on higher side but it is at goal. Stick to same rx for now.          Risks and Recommendations:  The patient has the following additional risks and recommendations for perioperative complications:   - No identified additional risk factors other than previously addressed    Medication Instructions:  Patient is to take all scheduled medications on the day of surgery    RECOMMENDATION:  APPROVAL GIVEN to proceed with proposed procedure, without further diagnostic evaluation.      Subjective     HPI related to upcoming procedure: will be having open hernia surgery.     Had hernia surgery on left side in the past.     No allergy to latex.     Preop Questions 8/2/2022   1. Have you ever had a heart attack or stroke? No   2. Have you ever had surgery on your heart or blood vessels, such as a stent placement, a coronary artery bypass, or surgery on an artery in your head, neck, heart, or legs? No   3. Do you have chest pain with activity? No   4. Do you have a history of  heart failure? No   5. Do  you currently have a cold, bronchitis or symptoms of other infection? No   6. Do you have a cough, shortness of breath, or wheezing? No   7. Do you or anyone in your family have previous history of blood clots? No   8. Do you or does anyone in your family have a serious bleeding problem such as prolonged bleeding following surgeries or cuts? No   9. Have you ever had problems with anemia or been told to take iron pills? No   10. Have you had any abnormal blood loss such as black, tarry or bloody stools? No   11. Have you ever had a blood transfusion? No   12. Are you willing to have a blood transfusion if it is medically needed before, during, or after your surgery? Yes   13. Have you or any of your relatives ever had problems with anesthesia? No   14. Do you have sleep apnea, excessive snoring or daytime drowsiness? No   15. Do you have any artifical heart valves or other implanted medical devices like a pacemaker, defibrillator, or continuous glucose monitor? No   16. Do you have artificial joints? No   17. Are you allergic to latex? No     Health Care Directive:  Patient does not have a Health Care Directive or Living Will: Discussed advance care planning with patient; information given to patient to review.    Preoperative Review of :   reviewed - no record of controlled substances prescribed.         Review of Systems  Constitutional, neuro, ENT, endocrine, pulmonary, cardiac, gastrointestinal, genitourinary, musculoskeletal, integument and psychiatric systems are negative, except as otherwise noted.    Patient Active Problem List    Diagnosis Date Noted     Right inguinal hernia 07/11/2022     Priority: Medium     Added automatically from request for surgery [id number]       Prostate cancer (H) 03/20/2020     Priority: Medium     Impaired fasting blood sugar 12/21/2010     Priority: Medium     Uncontrolled hypertension 12/16/2010     Priority: Medium     Hyperlipidemia LDL goal <130 05/09/2010     Priority:  "Medium      Past Medical History:   Diagnosis Date     Cancer (H) 2020    prostate     CARDIOVASCULAR SCREENING; LDL GOAL LESS THAN 130 5/9/2010     Hypertension goal BP (blood pressure) < 140/90 12/16/2010     Impaired fasting blood sugar 12/21/2010     Past Surgical History:   Procedure Laterality Date     BIOPSY  2020    prostate     COLONOSCOPY  03/09/2009    Negative - Due in 2019     COLONOSCOPY N/A 03/09/2021    Procedure: COLONOSCOPY, WITH POLYPECTOMY;  Surgeon: Lani Marie MD;  Location: [facility] OR     DAVINCI PROSTATECTOMY, LYMPHADENECTOMY N/A 04/29/2020    Procedure: ROBOTIC ASSISTED PROSTATECTOMY, WITH BILATERAL PELVIC LYMPHADENECTOMY;  Surgeon: Brady Flannery MD;  Location: [facility] OR     HERNIA REPAIR, INGUINAL RT/LT  01/26/2006    Left Inguinal Hernia     Current Outpatient Medications   Medication Sig Dispense Refill     amLODIPine (NORVASC) 10 MG tablet Take 1 tablet (10 mg) by mouth daily 90 tablet 2     atenolol (TENORMIN) 100 MG tablet Take 1 tablet (100 mg) by mouth daily 90 tablet 2     FISH OIL 1200 MG OR CAPS Take 2 capsules by mouth every other day        lisinopril-hydrochlorothiazide (ZESTORETIC) 20-12.5 MG tablet TAKE 2 TABLETS BY MOUTH EVERY MORNING 180 tablet 3     sildenafil (REVATIO) 20 MG tablet Take 1 tablet (20 mg) by mouth daily as needed (sexual activity) 30 tablet 3     VITAMIN D, CHOLECALCIFEROL, PO Take 1,000 Units by mouth daily       Allergies   Allergen Reactions     No Known Drug Allergies         Social History     Tobacco Use     Smoking status: Never Smoker     Smokeless tobacco: Never Used   Substance Use Topics     Alcohol use: Not Currently     Comment: 1 drink every two weeks. During Football Season     History   Drug Use No         Objective     /88 (BP Location: Right arm, Patient Position: Sitting, Cuff Size: Adult Large)   Pulse 86   Temp 97.9  F (36.6  C) (Temporal)   Resp 16   Ht 1.797 m (5' 10.75\")   Wt 89.8 kg (198 lb)   SpO2 98%   " BMI 27.81 kg/m      Physical Exam    GENERAL APPEARANCE: healthy, alert and no distress     EYES: EOMI,  PERRL     HENT: ear canals and TM's normal and nose and mouth without ulcers or lesions     NECK: no adenopathy, no asymmetry, masses, or scars and thyroid normal to palpation     RESP: lungs clear to auscultation - no rales, rhonchi or wheezes     CV: regular rates and rhythm, normal S1 S2, no S3 or S4 and no murmur, click or rub     MS: extremities normal- no gross deformities noted, no evidence of inflammation in joints, FROM in all extremities.     SKIN: no suspicious lesions or rashes     NEURO: Normal strength and tone, sensory exam grossly normal, mentation intact and speech normal     PSYCH: mentation appears normal. and affect normal/bright     LYMPHATICS: No cervical adenopathy    Recent Labs   Lab Test 04/04/22  0913 02/08/21  0903    133   POTASSIUM 3.8 4.2   CR 1.00 1.01   A1C  --  5.0      Diagnostics:  No labs were ordered during this visit.   No EKG required, no history of coronary heart disease, significant arrhythmia, peripheral arterial disease or other structural heart disease.    Revised Cardiac Risk Index (RCRI):  The patient has the following serious cardiovascular risks for perioperative complications:   - No serious cardiac risks = 0 points     RCRI Interpretation: 0 points: Class I (very low risk - 0.4% complication rate)           Signed Electronically by: Chance Guzmán MD, MD  Copy of this evaluation report is provided to requesting physician.

## 2022-08-23 ENCOUNTER — TELEPHONE (OUTPATIENT)
Dept: SURGERY | Facility: CLINIC | Age: 67
End: 2022-08-23

## 2022-08-23 NOTE — TELEPHONE ENCOUNTER
Patient sent a message about his COVID testing, where should he send it to? I spoke with Dion. He is scheduled for hernia surgery on 8/30 with Dr. Morales. I had asked him to do an at-home COVID test on 8/28 and bring the result with him when he comes for surgery.

## 2022-08-29 ENCOUNTER — ANESTHESIA EVENT (OUTPATIENT)
Dept: SURGERY | Facility: AMBULATORY SURGERY CENTER | Age: 67
End: 2022-08-29
Payer: COMMERCIAL

## 2022-08-30 ENCOUNTER — ANESTHESIA (OUTPATIENT)
Dept: SURGERY | Facility: AMBULATORY SURGERY CENTER | Age: 67
End: 2022-08-30
Payer: COMMERCIAL

## 2022-08-30 ENCOUNTER — HOSPITAL ENCOUNTER (OUTPATIENT)
Facility: AMBULATORY SURGERY CENTER | Age: 67
Discharge: HOME | End: 2022-08-30
Attending: SURGERY
Payer: COMMERCIAL

## 2022-08-30 VITALS
SYSTOLIC BLOOD PRESSURE: 132 MMHG | RESPIRATION RATE: 18 BRPM | HEIGHT: 71 IN | WEIGHT: 190 LBS | DIASTOLIC BLOOD PRESSURE: 83 MMHG | OXYGEN SATURATION: 96 % | TEMPERATURE: 97 F | HEART RATE: 58 BPM | BODY MASS INDEX: 26.6 KG/M2

## 2022-08-30 DIAGNOSIS — K40.90 RIGHT INGUINAL HERNIA: ICD-10-CM

## 2022-08-30 PROCEDURE — 88302 TISSUE EXAM BY PATHOLOGIST: CPT | Mod: 26 | Performed by: PATHOLOGY

## 2022-08-30 PROCEDURE — 88302 TISSUE EXAM BY PATHOLOGIST: CPT | Mod: TC | Performed by: SURGERY

## 2022-08-30 PROCEDURE — 49505 PRP I/HERN INIT REDUC >5 YR: CPT | Mod: RT | Performed by: SURGERY

## 2022-08-30 PROCEDURE — 49505 PRP I/HERN INIT REDUC >5 YR: CPT

## 2022-08-30 DEVICE — MESH PROGRIP 4.7X3" PARIETEX RIGHT TEM1208GR: Type: IMPLANTABLE DEVICE | Site: GROIN | Status: FUNCTIONAL

## 2022-08-30 RX ORDER — PROPOFOL 10 MG/ML
INJECTION, EMULSION INTRAVENOUS CONTINUOUS PRN
Status: DISCONTINUED | OUTPATIENT
Start: 2022-08-30 | End: 2022-08-30

## 2022-08-30 RX ORDER — LIDOCAINE HYDROCHLORIDE 10 MG/ML
INJECTION, SOLUTION INFILTRATION; PERINEURAL PRN
Status: DISCONTINUED | OUTPATIENT
Start: 2022-08-30 | End: 2022-08-30 | Stop reason: HOSPADM

## 2022-08-30 RX ORDER — LIDOCAINE HYDROCHLORIDE 20 MG/ML
INJECTION, SOLUTION INFILTRATION; PERINEURAL PRN
Status: DISCONTINUED | OUTPATIENT
Start: 2022-08-30 | End: 2022-08-30

## 2022-08-30 RX ORDER — CEFAZOLIN SODIUM 2 G/50ML
2 SOLUTION INTRAVENOUS SEE ADMIN INSTRUCTIONS
Status: DISCONTINUED | OUTPATIENT
Start: 2022-08-30 | End: 2022-08-30 | Stop reason: HOSPADM

## 2022-08-30 RX ORDER — OXYCODONE HYDROCHLORIDE 5 MG/1
5-10 TABLET ORAL EVERY 4 HOURS PRN
Qty: 20 TABLET | Refills: 0 | Status: SHIPPED | OUTPATIENT
Start: 2022-08-30 | End: 2022-12-29

## 2022-08-30 RX ORDER — FENTANYL CITRATE 50 UG/ML
INJECTION, SOLUTION INTRAMUSCULAR; INTRAVENOUS PRN
Status: DISCONTINUED | OUTPATIENT
Start: 2022-08-30 | End: 2022-08-30

## 2022-08-30 RX ORDER — ONDANSETRON 2 MG/ML
4 INJECTION INTRAMUSCULAR; INTRAVENOUS EVERY 30 MIN PRN
Status: DISCONTINUED | OUTPATIENT
Start: 2022-08-30 | End: 2022-08-31 | Stop reason: HOSPADM

## 2022-08-30 RX ORDER — PROCHLORPERAZINE MALEATE 10 MG
10 TABLET ORAL EVERY 6 HOURS PRN
Status: DISCONTINUED | OUTPATIENT
Start: 2022-08-30 | End: 2022-08-31 | Stop reason: HOSPADM

## 2022-08-30 RX ORDER — HYDRALAZINE HYDROCHLORIDE 20 MG/ML
2.5-5 INJECTION INTRAMUSCULAR; INTRAVENOUS EVERY 10 MIN PRN
Status: DISCONTINUED | OUTPATIENT
Start: 2022-08-30 | End: 2022-08-30 | Stop reason: HOSPADM

## 2022-08-30 RX ORDER — ACETAMINOPHEN 325 MG/1
975 TABLET ORAL ONCE
Status: COMPLETED | OUTPATIENT
Start: 2022-08-30 | End: 2022-08-30

## 2022-08-30 RX ORDER — DEXAMETHASONE SODIUM PHOSPHATE 4 MG/ML
INJECTION, SOLUTION INTRA-ARTICULAR; INTRALESIONAL; INTRAMUSCULAR; INTRAVENOUS; SOFT TISSUE PRN
Status: DISCONTINUED | OUTPATIENT
Start: 2022-08-30 | End: 2022-08-30

## 2022-08-30 RX ORDER — NALOXONE HYDROCHLORIDE 0.4 MG/ML
0.4 INJECTION, SOLUTION INTRAMUSCULAR; INTRAVENOUS; SUBCUTANEOUS
Status: DISCONTINUED | OUTPATIENT
Start: 2022-08-30 | End: 2022-08-31 | Stop reason: HOSPADM

## 2022-08-30 RX ORDER — KETAMINE HYDROCHLORIDE 10 MG/ML
INJECTION INTRAMUSCULAR; INTRAVENOUS PRN
Status: DISCONTINUED | OUTPATIENT
Start: 2022-08-30 | End: 2022-08-30

## 2022-08-30 RX ORDER — LORAZEPAM 2 MG/ML
.5-1 INJECTION INTRAMUSCULAR
Status: DISCONTINUED | OUTPATIENT
Start: 2022-08-30 | End: 2022-08-30 | Stop reason: HOSPADM

## 2022-08-30 RX ORDER — PROPOFOL 10 MG/ML
INJECTION, EMULSION INTRAVENOUS PRN
Status: DISCONTINUED | OUTPATIENT
Start: 2022-08-30 | End: 2022-08-30

## 2022-08-30 RX ORDER — FLUMAZENIL 0.1 MG/ML
0.2 INJECTION, SOLUTION INTRAVENOUS
Status: DISCONTINUED | OUTPATIENT
Start: 2022-08-30 | End: 2022-08-31 | Stop reason: HOSPADM

## 2022-08-30 RX ORDER — ONDANSETRON 4 MG/1
4 TABLET, ORALLY DISINTEGRATING ORAL EVERY 6 HOURS PRN
Status: DISCONTINUED | OUTPATIENT
Start: 2022-08-30 | End: 2022-08-31 | Stop reason: HOSPADM

## 2022-08-30 RX ORDER — NALOXONE HYDROCHLORIDE 0.4 MG/ML
0.2 INJECTION, SOLUTION INTRAMUSCULAR; INTRAVENOUS; SUBCUTANEOUS
Status: DISCONTINUED | OUTPATIENT
Start: 2022-08-30 | End: 2022-08-31 | Stop reason: HOSPADM

## 2022-08-30 RX ORDER — LIDOCAINE 40 MG/G
CREAM TOPICAL
Status: DISCONTINUED | OUTPATIENT
Start: 2022-08-30 | End: 2022-08-30 | Stop reason: HOSPADM

## 2022-08-30 RX ORDER — KETOROLAC TROMETHAMINE 30 MG/ML
INJECTION, SOLUTION INTRAMUSCULAR; INTRAVENOUS PRN
Status: DISCONTINUED | OUTPATIENT
Start: 2022-08-30 | End: 2022-08-30

## 2022-08-30 RX ORDER — KETOROLAC TROMETHAMINE 30 MG/ML
15 INJECTION, SOLUTION INTRAMUSCULAR; INTRAVENOUS EVERY 6 HOURS PRN
Status: DISCONTINUED | OUTPATIENT
Start: 2022-08-30 | End: 2022-08-31 | Stop reason: HOSPADM

## 2022-08-30 RX ORDER — ONDANSETRON 4 MG/1
4 TABLET, ORALLY DISINTEGRATING ORAL EVERY 30 MIN PRN
Status: DISCONTINUED | OUTPATIENT
Start: 2022-08-30 | End: 2022-08-31 | Stop reason: HOSPADM

## 2022-08-30 RX ORDER — FENTANYL CITRATE 50 UG/ML
25 INJECTION, SOLUTION INTRAMUSCULAR; INTRAVENOUS
Status: DISCONTINUED | OUTPATIENT
Start: 2022-08-30 | End: 2022-08-31 | Stop reason: HOSPADM

## 2022-08-30 RX ORDER — ONDANSETRON 2 MG/ML
4 INJECTION INTRAMUSCULAR; INTRAVENOUS EVERY 6 HOURS PRN
Status: DISCONTINUED | OUTPATIENT
Start: 2022-08-30 | End: 2022-08-31 | Stop reason: HOSPADM

## 2022-08-30 RX ORDER — ALBUTEROL SULFATE 0.83 MG/ML
2.5 SOLUTION RESPIRATORY (INHALATION) EVERY 4 HOURS PRN
Status: DISCONTINUED | OUTPATIENT
Start: 2022-08-30 | End: 2022-08-30 | Stop reason: HOSPADM

## 2022-08-30 RX ORDER — BUPIVACAINE HYDROCHLORIDE 2.5 MG/ML
INJECTION, SOLUTION INFILTRATION; PERINEURAL PRN
Status: DISCONTINUED | OUTPATIENT
Start: 2022-08-30 | End: 2022-08-30 | Stop reason: HOSPADM

## 2022-08-30 RX ORDER — HYDROMORPHONE HYDROCHLORIDE 1 MG/ML
0.2 INJECTION, SOLUTION INTRAMUSCULAR; INTRAVENOUS; SUBCUTANEOUS EVERY 5 MIN PRN
Status: DISCONTINUED | OUTPATIENT
Start: 2022-08-30 | End: 2022-08-30 | Stop reason: HOSPADM

## 2022-08-30 RX ORDER — OXYCODONE HYDROCHLORIDE 5 MG/1
5 TABLET ORAL EVERY 4 HOURS PRN
Status: DISCONTINUED | OUTPATIENT
Start: 2022-08-30 | End: 2022-08-31 | Stop reason: HOSPADM

## 2022-08-30 RX ORDER — SODIUM CHLORIDE, SODIUM LACTATE, POTASSIUM CHLORIDE, CALCIUM CHLORIDE 600; 310; 30; 20 MG/100ML; MG/100ML; MG/100ML; MG/100ML
INJECTION, SOLUTION INTRAVENOUS CONTINUOUS
Status: DISCONTINUED | OUTPATIENT
Start: 2022-08-30 | End: 2022-08-31 | Stop reason: HOSPADM

## 2022-08-30 RX ORDER — CEFAZOLIN SODIUM 2 G/50ML
2 SOLUTION INTRAVENOUS
Status: COMPLETED | OUTPATIENT
Start: 2022-08-30 | End: 2022-08-30

## 2022-08-30 RX ORDER — MEPERIDINE HYDROCHLORIDE 25 MG/ML
12.5 INJECTION INTRAMUSCULAR; INTRAVENOUS; SUBCUTANEOUS
Status: DISCONTINUED | OUTPATIENT
Start: 2022-08-30 | End: 2022-08-31 | Stop reason: HOSPADM

## 2022-08-30 RX ORDER — FENTANYL CITRATE 50 UG/ML
25 INJECTION, SOLUTION INTRAMUSCULAR; INTRAVENOUS EVERY 5 MIN PRN
Status: DISCONTINUED | OUTPATIENT
Start: 2022-08-30 | End: 2022-08-30 | Stop reason: HOSPADM

## 2022-08-30 RX ORDER — ONDANSETRON 2 MG/ML
INJECTION INTRAMUSCULAR; INTRAVENOUS PRN
Status: DISCONTINUED | OUTPATIENT
Start: 2022-08-30 | End: 2022-08-30

## 2022-08-30 RX ORDER — SODIUM CHLORIDE, SODIUM LACTATE, POTASSIUM CHLORIDE, CALCIUM CHLORIDE 600; 310; 30; 20 MG/100ML; MG/100ML; MG/100ML; MG/100ML
INJECTION, SOLUTION INTRAVENOUS CONTINUOUS
Status: DISCONTINUED | OUTPATIENT
Start: 2022-08-30 | End: 2022-08-30 | Stop reason: HOSPADM

## 2022-08-30 RX ADMIN — OXYCODONE HYDROCHLORIDE 5 MG: 5 TABLET ORAL at 12:41

## 2022-08-30 RX ADMIN — ONDANSETRON 4 MG: 2 INJECTION INTRAMUSCULAR; INTRAVENOUS at 09:41

## 2022-08-30 RX ADMIN — PROPOFOL 200 MCG/KG/MIN: 10 INJECTION, EMULSION INTRAVENOUS at 09:41

## 2022-08-30 RX ADMIN — FENTANYL CITRATE 50 MCG: 50 INJECTION, SOLUTION INTRAMUSCULAR; INTRAVENOUS at 10:14

## 2022-08-30 RX ADMIN — PROPOFOL 170 MG: 10 INJECTION, EMULSION INTRAVENOUS at 09:41

## 2022-08-30 RX ADMIN — KETAMINE HYDROCHLORIDE 20 MG: 10 INJECTION INTRAMUSCULAR; INTRAVENOUS at 11:23

## 2022-08-30 RX ADMIN — PROPOFOL 125 MCG/KG/MIN: 10 INJECTION, EMULSION INTRAVENOUS at 10:21

## 2022-08-30 RX ADMIN — LIDOCAINE HYDROCHLORIDE 100 MG: 20 INJECTION, SOLUTION INFILTRATION; PERINEURAL at 09:41

## 2022-08-30 RX ADMIN — KETOROLAC TROMETHAMINE 15 MG: 30 INJECTION, SOLUTION INTRAMUSCULAR; INTRAVENOUS at 10:57

## 2022-08-30 RX ADMIN — DEXAMETHASONE SODIUM PHOSPHATE 4 MG: 4 INJECTION, SOLUTION INTRA-ARTICULAR; INTRALESIONAL; INTRAMUSCULAR; INTRAVENOUS; SOFT TISSUE at 09:41

## 2022-08-30 RX ADMIN — SODIUM CHLORIDE, SODIUM LACTATE, POTASSIUM CHLORIDE, CALCIUM CHLORIDE: 600; 310; 30; 20 INJECTION, SOLUTION INTRAVENOUS at 09:33

## 2022-08-30 RX ADMIN — PROPOFOL 125 MCG/KG/MIN: 10 INJECTION, EMULSION INTRAVENOUS at 11:06

## 2022-08-30 RX ADMIN — FENTANYL CITRATE 50 MCG: 50 INJECTION, SOLUTION INTRAMUSCULAR; INTRAVENOUS at 09:33

## 2022-08-30 RX ADMIN — ACETAMINOPHEN 975 MG: 325 TABLET ORAL at 08:05

## 2022-08-30 RX ADMIN — CEFAZOLIN SODIUM 2 G: 2 SOLUTION INTRAVENOUS at 09:33

## 2022-08-30 RX ADMIN — PROPOFOL 100 MCG/KG/MIN: 10 INJECTION, EMULSION INTRAVENOUS at 12:01

## 2022-08-30 NOTE — ANESTHESIA PREPROCEDURE EVALUATION
Anesthesia Pre-Procedure Evaluation    Patient: Dion Bowman   MRN: [id number] : 1955        Procedure : Procedure(s):  HERNIORRHAPHY, INGUINAL, OPEN RIGHT          Past Medical History:   Diagnosis Date     Cancer (H)     prostate     CARDIOVASCULAR SCREENING; LDL GOAL LESS THAN 130 2010     Hypertension goal BP (blood pressure) < 140/90 2010     Impaired fasting blood sugar 2010      Past Surgical History:   Procedure Laterality Date     ABDOMEN SURGERY  4/15/2020    Prostate surgery     BIOPSY      prostate     COLONOSCOPY  2009    Negative - Due in 2019     COLONOSCOPY N/A 2021    Procedure: COLONOSCOPY, WITH POLYPECTOMY;  Surgeon: Lani Marie MD;  Location: [facility] OR     DAVINCI PROSTATECTOMY, LYMPHADENECTOMY N/A 2020    Procedure: ROBOTIC ASSISTED PROSTATECTOMY, WITH BILATERAL PELVIC LYMPHADENECTOMY;  Surgeon: Brady Flannery MD;  Location: [facility] OR     HERNIA REPAIR, INGUINAL RT/LT  2006    Left Inguinal Hernia      Allergies   Allergen Reactions     No Known Drug Allergies       Social History     Tobacco Use     Smoking status: Never Smoker     Smokeless tobacco: Never Used   Substance Use Topics     Alcohol use: Not Currently     Comment: 1 drink every two weeks. During Football Season      Wt Readings from Last 1 Encounters:   22 86.2 kg (190 lb)        Anesthesia Evaluation   Pt has had prior anesthetic. Type: General.        ROS/MED HX  ENT/Pulmonary:  - neg pulmonary ROS     Neurologic:  - neg neurologic ROS     Cardiovascular:     (+) hypertension-----    METS/Exercise Tolerance:     Hematologic:  - neg hematologic  ROS     Musculoskeletal:  - neg musculoskeletal ROS     GI/Hepatic:  - neg GI/hepatic ROS     Renal/Genitourinary:  - neg Renal ROS     Endo:  - neg endo ROS     Psychiatric/Substance Use:  - neg psychiatric ROS     Infectious Disease:  - neg infectious disease ROS     Malignancy:   (+) Malignancy,     Other:             Physical Exam    Airway  airway exam normal           Respiratory Devices and Support         Dental  no notable dental history         Cardiovascular   cardiovascular exam normal          Pulmonary   pulmonary exam normal                OUTSIDE LABS:  CBC:   Lab Results   Component Value Date    WBC 10.1 04/30/2020    WBC 9.3 04/27/2020    HGB 12.5 (L) 04/30/2020    HGB 15.0 04/27/2020    HCT 36.1 (L) 04/30/2020    HCT 42.5 04/27/2020     04/30/2020     04/27/2020     BMP:   Lab Results   Component Value Date     04/04/2022     02/08/2021    POTASSIUM 3.8 04/04/2022    POTASSIUM 4.2 02/08/2021    CHLORIDE 103 04/04/2022    CHLORIDE 101 02/08/2021    CO2 25 04/04/2022    CO2 28 02/08/2021    BUN 18 04/04/2022    BUN 15 02/08/2021    CR 1.00 04/04/2022    CR 1.01 02/08/2021     (H) 04/04/2022     (H) 02/08/2021     COAGS: No results found for: PTT, INR, FIBR  POC: No results found for: BGM, HCG, HCGS  HEPATIC:   Lab Results   Component Value Date    ALBUMIN 3.8 04/04/2022    PROTTOTAL 7.9 04/04/2022    ALT 26 04/04/2022    AST 22 04/04/2022    ALKPHOS 74 04/04/2022    BILITOTAL 0.5 04/04/2022     OTHER:   Lab Results   Component Value Date    A1C 5.0 02/08/2021    WADE 9.5 04/04/2022    TSH 1.27 12/24/2012       Anesthesia Plan    ASA Status:  2   NPO Status:  NPO Appropriate    Anesthesia Type: General.     - Airway: LMA   Induction: Intravenous.   Maintenance: TIVA.        Consents    Anesthesia Plan(s) and associated risks, benefits, and realistic alternatives discussed. Questions answered and patient/representative(s) expressed understanding.     - Discussed: Risks, Benefits and Alternatives for BOTH SEDATION and the PROCEDURE were discussed     - Discussed with:  Patient         Postoperative Care    Pain management: Oral pain medications.   PONV prophylaxis: Ondansetron (or other 5HT-3), Dexamethasone or Solumedrol     Comments:                Cory Pineda MD,  MD

## 2022-08-30 NOTE — INTERVAL H&P NOTE
"I have reviewed the surgical (or preoperative) H&P that is linked to this encounter, and examined the patient. There are no significant changes    Clinical Conditions Present on Arrival:  Clinically Significant Risk Factors Present on Admission                   # Overweight: Estimated body mass index is 26.5 kg/m  as calculated from the following:    Height as of this encounter: 1.803 m (5' 11\").    Weight as of this encounter: 86.2 kg (190 lb).       "

## 2022-08-30 NOTE — OP NOTE
[facility]    Operative Note    Pre-operative diagnosis: Right inguinal hernia [K40.90]   Post-operative diagnosis R pantaloon hernia, large   Procedure: Procedure(s):  HERNIORRHAPHY, INGUINAL, OPEN RIGHT   Surgeon: Surgeon(s) and Role:     * Bud Morales MD - Primary   Anesthesia: General    Estimated blood loss: 25 ml   Drains: None   Specimens: ID Type Source Tests Collected by Time Destination   1 : Hernia Sac, Right Inguinal Tissue Hernia Sac SURGICAL PATHOLOGY EXAM Bud Morales MD 8/30/2022 10:48 AM       Findings: large hernia and hernia sac (indirect), grossly weakened floor (direct hernia) with visible preperitoneal fat.   Complications: None.   Implants: .  Parietex progrip 12 cm x 8 cm         OPERATIVE INDICATIONS:  Dion Bowman is a 67 year old year old male with a history of a lump and pain in right groin    After understanding the risks and benefits of proceeding with surgery, the patient has an indication for open right inguinal hernia repair with mesh and consented to undergo surgery.    I reviewed the risks of surgery with Dion Bowman .    These include, but are not limited to, death, myocardial infarction, pneumonia, urinary tract infection, deep venous thrombosis with or without pulmonary embolus, abdominal infection from bowel injury or abscess, bowel obstruction, wound infection, and bleeding.    OPERATIVE DETAILS:  The patient was brought to the operating room and prepared in a routine fashion, and a Matthews was placed.  A timeout was performed prior to surgery and documented by the nursing team.    Under the benefits of general anesthesia, the patient was positioned supine. A field block was produced by raising skin wheals along the proposed skin incision, along a vertical line lateral to it, and along a horizontal line superior to it. Additional local anesthesia was injected during the procedure under the external oblique aponeurosis,  at the internal ring, and as needed.    The skin crease incision was made with a knife ~1 fingerbreadth above and parallel to the inguinal ligament, of 6cm length.  The incision was carried down to the aponeurosis of the external oblique, which was cleared bluntly and the external ring was exposed. Hemostasis was achieved in the wound. An incision was made in the midportion of the external oblique aponeurosis in the direction of its fibers. The ilioinguinal nerve was identified. Flaps of the external oblique were developed superiorly and inferiorly.    The cord was identified. It was gently dissected free at the pubic tubercle and encircled with a Penrose drain. Attention was directed to the anteromedial aspect of the cord, where an indirect hernia sac was identified. The testicle easily protruded into the canal. The sac was carefully dissected free of the cord down to the level of the internal ring. The vas and testicular vessels were identified and protected from harm.  The sac was opened and contents were reduced. A finger was passed into the peritoneal cavity and the floor of the inguinal canal assessed and found to be strong. The femoral canal was palpated and no hernia identified. The sac was twisted and tied off with 2-0 silk. Redundant sac was excised and submitted to pathology. The stump of the sac was checked for hemostasis and allowed to retract into the abdomen.    Attention then turned to the floor of the canal, which appeared to be grossly weakened with a well-defined defect. The defect was closed primarily with 3-0 vicryl interrupteds from the shelving edge of the  inguinal ligament to the transversalis fascia, to have mesh lay atop. The  Right sided Parietex mesh was placed into the correct position starting at the pubic tubercle, running parallel to the inguinal ligament and encircling the cord at the internal ring. The billy-internal ring was slightly released by cutting the mesh with [place] scissors.  The mesh was secured with 4 2-0 pds sttches at the Coopers ligament, laterally, along the shelving edge and atop transversalis fascia. The testicle easily went back into the scrotum.    Hemostasis was again checked. There was a bleeding testicular vessel that was difficult to control without ligating the full vessel, but hemostasis was achieved with cautery and Mir. The Penrose drain was removed. The area was irrigated. The external oblique aponeurosis was closed with a running suture of 3-0 Vicryl. Chi's fascia was closed with interrupted 3-0 Vicryl. The skin was closed with a subcuticular stitch of 4-0 Monocryl. Exofin was applied.    The testicle was checked again and found to be in the scrotum. Matthews was removed    The patient tolerated the procedure well and was taken to the postanesthesia care unit in stable condition.    I was present for all critical components of the operation and all needle and sponge counts were correct x2 at the end of the procedure.    Bud Morales MD    Surgery  [phone] (hospital )  [phone] (clinic nurses)

## 2022-08-30 NOTE — INTERVAL H&P NOTE
"I have reviewed the surgical (or preoperative) H&P that is linked to this encounter, and examined the patient. There are no significant changes    Clinical Conditions Present on Arrival:  Clinically Significant Risk Factors Present on Admission                   # Overweight: Estimated body mass index is 26.5 kg/m  as calculated from the following:    Height as of this encounter: 1.803 m (5' 11\").    Weight as of this encounter: 86.2 kg (190 lb).       " [FreeTextEntry1] : UTI - start bactrim. check culture\par abd pain - possibly referred pain from UTI. if no resolution will obtain CT abd/pelvis\par DM- add on januvia to regimen\par maxillary sinusitis - pt to see ENT tomorrow

## 2022-08-30 NOTE — DISCHARGE INSTRUCTIONS
"[facility]  Home Care Following Anesthesia  For 24 hours after surgery:  Get plenty of rest.  A responsible adult must stay with you for at least 24 hours after you leave the surgery center.  Do not drive or use heavy equipment.  If you have weakness or tingling, don't drive or use heavy equipment until this feeling goes away.   Do not drink alcohol.   Avoid strenuous or risky activities.  Ask for help when climbing stairs.  You may feel lightheaded.  IF so, sit for a few minutes before standing.  Have someone help you get up.   If you have nausea (feel sick to your stomach): Drink only clear liquids such as apple juice, ginger ale, broth or 7-Up.  Rest may also help.  Be sure to drink enough fluids.  Move to a regular diet as you feel able.   You may have a slight fever.  Call the doctor if your fever is over 100 F (37.7 C) (taken under the tongue) or lasts longer than 24 hours.  You may have a dry mouth, a sore throat, muscle aches or trouble sleeping. These should go away after 24 hours.  Do not make important or legal decisions.   It is recommended to avoid smoking.        Today you received a Marcaine or bupivacaine block to numb the nerves near your surgery site.  This is a block using local anesthetic or \"numbing\" medication injected around the nerves to anesthetize or \"numb\" the area supplied by those nerves.  This block is injected into the muscle layer near your surgical site.  The medication may numb the location where you had surgery for 6-18 hours, but may last up to 24 hours.  If your surgical site is an arm or leg you should be careful with your affected limb, since it is possible to injure your limb without being aware of it due to the numbing.  Until full feeling returns, you should guard against bumping or hitting your limb, and avoid extreme hot or cold temperatures on the skin.  As the block wears off, the feeling will return as a tingling or prickly " sensation near your surgical site.  You will experience more discomfort from your incision as the feeling returns.  You may want to take a pain pill (a narcotic or Tylenol if this was prescribed by your surgeon) when you start to experience mild pain before the pain beccomes more severe.  If your pain medications do not control your pain you should notifiy your surgeon.    Tips for taking pain medications  To get the best pain relief possible, remember these points:  Take pain medications as directed, before pain becomes severe.  Pain medication can upset your stomach: taking it with food may help.  Constipation is a common side effect of pain medication. Drink plenty of  fluids.  Eat foods high in fiber. Take a stool softener if recommended by your doctor or pharmacist.  Do not drink alcohol, drive or operate machinery while taking pain medications.  Ask about other ways to control pain, such as with heat, ice or relaxation.    Tylenol/Acetaminophen Consumption  To help encourage the safe use of acetaminophen, the makers of TYLENOL  have lowered the maximum daily dose for single-ingredient Extra Strength TYLENOL  (acetaminophen) products sold in the [place] from 8 pills per day (4,000 mg) to 6 pills per day (3,000 mg). The dosing interval has also changed from 2 pills every 4-6 hours to 2 pills every 6 hours.  If you feel your pain relief is insufficient, you may take Tylenol/Acetaminophen in addition to your narcotic pain medication.   Be careful not to exceed 3,000 mg of Tylenol/Acetaminophen in a 24 hour period from all sources.  If you are taking extra strength Tylenol/acetaminophen (500 mg), the maximum dose is 6 tablets in 24 hours.  If you are taking regular strength acetaminophen (325 mg), the maximum dose is 9 tablets in 24 hours.    Call a doctor for any of the following:  Signs of infection (fever, growing tenderness at the surgery site, a large amount of drainage or bleeding, severe pain, foul-smelling  drainage, redness, swelling).  It has been over 8 to 10 hours since surgery and you are still not able to urinate (pass water).  Headache for over 24 hours.  Numbness, tingling or weakness the day after surgery (if you had spinal anesthesia).  Signs of Covid-19 infection (temperature over 100 degrees, shortness of breath, cough, loss of taste/smell, generalized body aches, persistent headache, chills, sore throat, nausea/vomiting/diarrhea)  Your doctor is:       Dr. Bud Morales, General Surgery: [phone]               Or dial [phone] and ask for the resident on call for:  General Surgery  For emergency care, call the:  [place] [facility]:  [phone] (TTY for hearing impaired: [phone])

## 2022-08-30 NOTE — PROGRESS NOTES
The risks of hernia repair were reviewed with the patient.    These risks combine the risks of abdominal surgery and the risks of hernia repair, including mesh implantation, and were described to the patient as follows:    Abdominal surgery risks:    These include, but are not limited to, death, myocardial infarction, pneumonia, urinary tract infection, deep venous thrombosis with or without pulmonary embolus, abdominal infection from bowel injury or abscess, bowel obstruction, wound infection, and bleeding.    Hernia repair risks:    Food and Drug Administration Comments on Hernia Repair Surgery and Mesh Implantation.    http://www.fda.gov/MedicalDevices/ProductsandMedicalProcedures/ImplantsandProsthetics/HerniaSurgicalMesh/default.htm      Hernia Repair Complications    Based on FDA s analysis of medical device adverse event reports and of peer-reviewed, scientific literature, the most common adverse events for all surgical repair of hernias--with or without mesh--are pain, infection, hernia recurrence, scar-like tissue that sticks tissues together (adhesion), blockage of the large or small intestine (obstruction), bleeding, abnormal connection between organs, vessels, or intestines (fistula), fluid build-up at the surgical site (seroma), and a hole in neighboring tissues or organs (perforation).    The most common adverse events following hernia repair with mesh are pain, infection, hernia recurrence, adhesion, and bowel obstruction. Some other potential adverse events that can occur following hernia repair with mesh are mesh migration and mesh shrinkage (contraction).    Many complications related to hernia repair with surgical mesh that have been reported to the FDA have been associated with recalled mesh products that are no longer on the market. Pain, infection, recurrence, adhesion, obstruction, and perforation are the most common complications associated with recalled mesh. In the FDA s analysis of medical  adverse event reports to the FDA, recalled mesh products were the main cause of bowel perforation and obstruction complications.    Please refer to the recall notices here and here for more information if you have recalled mesh. For more information on the recalled products, please visit the FDA Medical Device Recall website. Please visit the Medical & Radiation Emitting Device Database to search a specific type of surgical mesh.    If you are unsure about the specific mesh  and brand used in your surgery and have questions about your hernia repair, contact your surgeon or the facility where your surgery was performed to obtain the information from your medical record.         Also discussed risk of aborting.    Bud Morales MD

## 2022-08-30 NOTE — ANESTHESIA POSTPROCEDURE EVALUATION
Patient: Dion MONTERO Bowman    Procedure: Procedure(s):  HERNIORRHAPHY, INGUINAL, OPEN RIGHT       Anesthesia Type:  General    Note:  Disposition: Outpatient   Postop Pain Control: Uneventful            Sign Out: Well controlled pain   PONV: No   Neuro/Psych: Uneventful            Sign Out: Acceptable/Baseline neuro status   Airway/Respiratory: Uneventful            Sign Out: Acceptable/Baseline resp. status   CV/Hemodynamics: Uneventful            Sign Out: Acceptable CV status; No obvious hypovolemia; No obvious fluid overload   Other NRE: NONE   DID A NON-ROUTINE EVENT OCCUR? No           Last vitals:  Vitals Value Taken Time   /94 08/30/22 1245   Temp 36.1  C (97  F) 08/30/22 1241   Pulse 57 08/30/22 1245   Resp 7 08/30/22 1245   SpO2 96 % 08/30/22 1245   Vitals shown include unvalidated device data.    Electronically Signed By: Cory Pineda MD, MD  August 30, 2022  1:36 PM

## 2022-08-30 NOTE — ANESTHESIA CARE TRANSFER NOTE
Patient: Dion MONTERO Bowman    Procedure: Procedure(s):  HERNIORRHAPHY, INGUINAL, OPEN RIGHT       Diagnosis: Right inguinal hernia [K40.90]  Diagnosis Additional Information: No value filed.    Anesthesia Type:   General     Note:    Oropharynx: oropharynx clear of all foreign objects  Level of Consciousness: drowsy  Oxygen Supplementation: room air    Independent Airway: airway patency satisfactory and stable  Dentition: dentition unchanged  Vital Signs Stable: post-procedure vital signs reviewed and stable  Report to RN Given: handoff report given  Patient transferred to: PACU  Comments: Vital signs per nursing documentation.     Handoff Report: Identifed the Patient, Identified the Reponsible Provider, Reviewed the pertinent medical history, Discussed the surgical course, Reviewed Intra-OP anesthesia mangement and issues during anesthesia, Set expectations for post-procedure period and Allowed opportunity for questions and acknowledgement of understanding      Vitals:  Vitals Value Taken Time   BP     Temp     Pulse 66    Resp 13    SpO2 93 % 08/30/22 1221   Vitals shown include unvalidated device data.    Electronically Signed By: KARAN Juarez CRNA  August 30, 2022  12:22 PM

## 2022-09-01 ENCOUNTER — PATIENT OUTREACH (OUTPATIENT)
Dept: ENDOCRINOLOGY | Facility: CLINIC | Age: 67
End: 2022-09-01

## 2022-09-01 NOTE — PROGRESS NOTES
RN Post-Op/Post-Discharge Care Coordination Note    Mr. Dion Bowman is a 67 year old male who underwent open inguinal hernia repair on 8/30/22 with  Dr. Bud Morlaes.  Spoke with Patient.    Support  Patient able to care for self independently     Health Status  Nausea/Vomiting: Patient reports nausea and vomiting.  Eating/drinking: Patient is able to eat and drink without any complaints.  Bowel habits: Patient reports constipation. and is passing gas.  Drains (HEAVEN): N/A  Fevers/chills: Patient denies any fever or chills.  Incisions: Patient denies any signs and symptoms of infection..  Wound closure:  Skin Sealant  Pain: Rates pain a 0 with sitting and 7 with movement.    New Medications:  oxycodone    Activity/Restrictions  No lifting in excess of 15-20 pounds for 3-4 weeks    Equipment  None    Pathology reviewed with patient:  N/A    Forms/Letters  No    All of his questions were answered including reviewing restrictions, and wound care.  He will call this office if he has any further questions and/or concerns.      In lieu of a post-op clinic patient that patient would like to be contacted in approximately 7-10 days via telephone.    A copy of this note was routed to the primary surgeon.      Whom and When to Call  Patient acknowledges understanding of how to manage any medication changes and   when to seek medical care.     Patient advised that if after hour medical concerns arise to please call [phone] and choose option 4 to speak to the physician on call.

## 2022-09-02 LAB
PATH REPORT.COMMENTS IMP SPEC: NORMAL
PATH REPORT.FINAL DX SPEC: NORMAL
PATH REPORT.GROSS SPEC: NORMAL
PATH REPORT.MICROSCOPIC SPEC OTHER STN: NORMAL
PATH REPORT.RELEVANT HX SPEC: NORMAL
PHOTO IMAGE: NORMAL

## 2022-12-29 ENCOUNTER — OFFICE VISIT (OUTPATIENT)
Dept: UROLOGY | Facility: CLINIC | Age: 67
End: 2022-12-29
Payer: COMMERCIAL

## 2022-12-29 VITALS
BODY MASS INDEX: 26.6 KG/M2 | HEIGHT: 71 IN | DIASTOLIC BLOOD PRESSURE: 76 MMHG | HEART RATE: 74 BPM | WEIGHT: 190 LBS | OXYGEN SATURATION: 96 % | SYSTOLIC BLOOD PRESSURE: 138 MMHG

## 2022-12-29 DIAGNOSIS — C61 PROSTATE CANCER (H): Primary | ICD-10-CM

## 2022-12-29 LAB — PSA SERPL-MCNC: <0.04 UG/L (ref 0–4)

## 2022-12-29 PROCEDURE — 84153 ASSAY OF PSA TOTAL: CPT | Performed by: UROLOGY

## 2022-12-29 PROCEDURE — 36415 COLL VENOUS BLD VENIPUNCTURE: CPT | Performed by: UROLOGY

## 2022-12-29 PROCEDURE — 99213 OFFICE O/P EST LOW 20 MIN: CPT | Performed by: UROLOGY

## 2022-12-29 ASSESSMENT — PAIN SCALES - GENERAL: PAINLEVEL: NO PAIN (0)

## 2022-12-29 NOTE — PROGRESS NOTES
"  CHIEF COMPLAINT   It was my pleasure to see Dion Bowman who is a 67 year old male for follow-up of Prostate Cancer.      HPI  Dion Bowman is a very pleasant 67 year old male who presents with a history of Prostate Cancer.  Had RALP completed 4/29/2020. Deb 4+3 disease, pT2. Doing well. Excellent return of continence. PSA jerod to 0.14 6/2021. Salvage radiation completed 9/2021 and tolerated this very well.  PSA now undetectable. Good return of continence and overall doing well.     PSA  12/29/2022 - <0.04  6/30/2022 - <0.04  12/9/2021 - <0.04  6/18/2021 - 0.14  3/9/2021 - 0.05  12/18/2020 - 0.04  9/4/2020 - <0.04     RALP 4/29/2020  FINAL DIAGNOSIS:   A: Prostate and bilateral seminal vesicles: Robotic assisted Radical   prostatectomy:   - Prostatic adenocarcinoma, mixed acinar and ductal types, [place]mayd score   4+3 = 7, grade group 3, involving   approximately 20% of total prostatic volume   - Tumor is confined to the prostate   - Resection margins negative for carcinoma   - Benign bilateral seminal vesicles   - See synoptic report for details     B: Lymph nodes, bilateral pelvic: Dissection:   - Nine lymph nodes, negative for metastatic carcinoma (0/9)     PHYSICAL EXAM  Patient is a 67 year old  male   Vitals: Blood pressure 138/76, pulse 74, height 1.803 m (5' 11\"), weight 86.2 kg (190 lb), SpO2 96 %.  General Appearance Adult: Body mass index is 26.5 kg/m .  Alert, no acute distress, oriented  Lungs: no respiratory distress, or pursed lip breathing  Abdomen: soft, nontender, no organomegaly or masses  Back: no CVAT  Neuro: Alert, oriented, speech and mentation normal  Psych: affect and mood normal    Outside and Past Medical records:  Review of the result(s) of each unique test - PSA     ASSESSMENT and PLAN  67 year old male with stage pT2N0, Deb 4+3=7 prostate cancer, s/p RALP completed 4/29/2020. Negative margins. His PSA jerod to 0.14 and he completed salvage radiotherapy 9/2021. Tolerated this well. " No changes in urinary control. PSA continues to be undetectable with no evidence of recurrence.     - Follow-up 6 months with PSA    15 minutes spent on the date of the encounter doing chart review, history and exam, documentation and further activities as noted above.    Brady Flannery MD  Urology  [facility]

## 2023-02-06 DIAGNOSIS — I10 BENIGN ESSENTIAL HTN: ICD-10-CM

## 2023-02-09 RX ORDER — ATENOLOL 100 MG/1
100 TABLET ORAL DAILY
Qty: 90 TABLET | Refills: 0 | Status: SHIPPED | OUTPATIENT
Start: 2023-02-09 | End: 2023-04-11

## 2023-02-09 RX ORDER — AMLODIPINE BESYLATE 10 MG/1
10 TABLET ORAL DAILY
Qty: 90 TABLET | Refills: 0 | Status: SHIPPED | OUTPATIENT
Start: 2023-02-09 | End: 2023-04-11

## 2023-02-09 NOTE — TELEPHONE ENCOUNTER
Prescription approved per [facility] Refill Protocol.    Shawna Grimm, BSN RN  [facility]

## 2023-02-09 NOTE — TELEPHONE ENCOUNTER
Prescription approved per [facility] Refill Protocol.    Shawna Grimm, BSN RN  [facility]

## 2023-03-23 DIAGNOSIS — I10 BENIGN ESSENTIAL HTN: ICD-10-CM

## 2023-03-23 RX ORDER — LISINOPRIL AND HYDROCHLOROTHIAZIDE 12.5; 2 MG/1; MG/1
TABLET ORAL
Qty: 180 TABLET | Refills: 3 | Status: SHIPPED | OUTPATIENT
Start: 2023-03-23 | End: 2024-03-11

## 2023-04-04 ASSESSMENT — ENCOUNTER SYMPTOMS
HEMATOCHEZIA: 0
FREQUENCY: 0
CHILLS: 0
NAUSEA: 0
NERVOUS/ANXIOUS: 0
PALPITATIONS: 0
CONSTIPATION: 0
HEADACHES: 0
MYALGIAS: 0
FEVER: 0
DYSURIA: 0
ARTHRALGIAS: 0
DIZZINESS: 0
SHORTNESS OF BREATH: 0
ABDOMINAL PAIN: 0
DIARRHEA: 0
JOINT SWELLING: 0
SORE THROAT: 0
COUGH: 0
WEAKNESS: 0
PARESTHESIAS: 0
EYE PAIN: 0
HEARTBURN: 0
HEMATURIA: 0

## 2023-04-04 ASSESSMENT — ACTIVITIES OF DAILY LIVING (ADL): CURRENT_FUNCTION: NO ASSISTANCE NEEDED

## 2023-04-11 ENCOUNTER — OFFICE VISIT (OUTPATIENT)
Dept: FAMILY MEDICINE | Facility: CLINIC | Age: 68
End: 2023-04-11
Payer: COMMERCIAL

## 2023-04-11 VITALS
DIASTOLIC BLOOD PRESSURE: 86 MMHG | TEMPERATURE: 97.2 F | WEIGHT: 202 LBS | OXYGEN SATURATION: 97 % | SYSTOLIC BLOOD PRESSURE: 142 MMHG | RESPIRATION RATE: 16 BRPM | BODY MASS INDEX: 27.36 KG/M2 | HEART RATE: 75 BPM | HEIGHT: 72 IN

## 2023-04-11 DIAGNOSIS — I10 BENIGN ESSENTIAL HTN: ICD-10-CM

## 2023-04-11 DIAGNOSIS — I10 UNCONTROLLED HYPERTENSION: ICD-10-CM

## 2023-04-11 DIAGNOSIS — N52.31 ERECTILE DYSFUNCTION AFTER RADICAL PROSTATECTOMY: ICD-10-CM

## 2023-04-11 DIAGNOSIS — E78.5 HYPERLIPIDEMIA LDL GOAL <130: ICD-10-CM

## 2023-04-11 DIAGNOSIS — Z00.00 ENCOUNTER FOR MEDICARE ANNUAL WELLNESS EXAM: Primary | ICD-10-CM

## 2023-04-11 DIAGNOSIS — R73.01 IMPAIRED FASTING BLOOD SUGAR: ICD-10-CM

## 2023-04-11 LAB
ANION GAP SERPL CALCULATED.3IONS-SCNC: 15 MMOL/L (ref 7–15)
BUN SERPL-MCNC: 14.9 MG/DL (ref 8–23)
CALCIUM SERPL-MCNC: 9.7 MG/DL (ref 8.8–10.2)
CHLORIDE SERPL-SCNC: 100 MMOL/L (ref 98–107)
CHOLEST SERPL-MCNC: 227 MG/DL
CREAT SERPL-MCNC: 1.13 MG/DL (ref 0.67–1.17)
DEPRECATED HCO3 PLAS-SCNC: 24 MMOL/L (ref 22–29)
GFR SERPL CREATININE-BSD FRML MDRD: 72 ML/MIN/1.73M2
GLUCOSE SERPL-MCNC: 138 MG/DL (ref 70–99)
HBA1C MFR BLD: 5 % (ref 0–5.6)
HDLC SERPL-MCNC: 50 MG/DL
LDLC SERPL CALC-MCNC: 143 MG/DL
NONHDLC SERPL-MCNC: 177 MG/DL
POTASSIUM SERPL-SCNC: 4.2 MMOL/L (ref 3.4–5.3)
PSA SERPL DL<=0.01 NG/ML-MCNC: 0.06 NG/ML (ref 0–4.5)
SODIUM SERPL-SCNC: 139 MMOL/L (ref 136–145)
TRIGL SERPL-MCNC: 171 MG/DL

## 2023-04-11 PROCEDURE — 80048 BASIC METABOLIC PNL TOTAL CA: CPT | Performed by: FAMILY MEDICINE

## 2023-04-11 PROCEDURE — G0438 PPPS, INITIAL VISIT: HCPCS | Performed by: FAMILY MEDICINE

## 2023-04-11 PROCEDURE — 80061 LIPID PANEL: CPT | Performed by: FAMILY MEDICINE

## 2023-04-11 PROCEDURE — 84153 ASSAY OF PSA TOTAL: CPT | Performed by: FAMILY MEDICINE

## 2023-04-11 PROCEDURE — 36415 COLL VENOUS BLD VENIPUNCTURE: CPT | Performed by: FAMILY MEDICINE

## 2023-04-11 PROCEDURE — 99214 OFFICE O/P EST MOD 30 MIN: CPT | Mod: 25 | Performed by: FAMILY MEDICINE

## 2023-04-11 PROCEDURE — 83036 HEMOGLOBIN GLYCOSYLATED A1C: CPT | Performed by: FAMILY MEDICINE

## 2023-04-11 RX ORDER — ATENOLOL 100 MG/1
100 TABLET ORAL DAILY
Qty: 90 TABLET | Refills: 3 | Status: SHIPPED | OUTPATIENT
Start: 2023-04-11 | End: 2024-04-15

## 2023-04-11 RX ORDER — SILDENAFIL CITRATE 20 MG/1
20 TABLET ORAL DAILY PRN
Qty: 30 TABLET | Refills: 3 | Status: SHIPPED | OUTPATIENT
Start: 2023-04-11

## 2023-04-11 RX ORDER — AMLODIPINE BESYLATE 10 MG/1
10 TABLET ORAL DAILY
Qty: 90 TABLET | Refills: 3 | Status: SHIPPED | OUTPATIENT
Start: 2023-04-11 | End: 2024-04-15

## 2023-04-11 ASSESSMENT — ENCOUNTER SYMPTOMS
HEADACHES: 0
DIZZINESS: 0
HEARTBURN: 0
HEMATOCHEZIA: 0
WEAKNESS: 0
CHILLS: 0
EYE PAIN: 0
SORE THROAT: 0
NERVOUS/ANXIOUS: 0
COUGH: 0
SHORTNESS OF BREATH: 0
PARESTHESIAS: 0
JOINT SWELLING: 0
MYALGIAS: 0
NAUSEA: 0
FREQUENCY: 0
ABDOMINAL PAIN: 0
DYSURIA: 0
PALPITATIONS: 0
ARTHRALGIAS: 0
HEMATURIA: 0
DIARRHEA: 0
CONSTIPATION: 0
FEVER: 0

## 2023-04-11 ASSESSMENT — ACTIVITIES OF DAILY LIVING (ADL): CURRENT_FUNCTION: NO ASSISTANCE NEEDED

## 2023-04-11 NOTE — PROGRESS NOTES
"SUBJECTIVE:   Dion is a 67 year old who presents for Preventive Visit.      4/11/2023     8:36 AM   Additional Questions   Roomed by Celina      Patient has been advised of split billing requirements and indicates understanding: Yes  Are you in the first 12 months of your Medicare coverage?  No    Healthy Habits:     In general, how would you rate your overall health?  Good    Frequency of exercise:  6-7 days/week    Duration of exercise:  45-60 minutes    Do you usually eat at least 4 servings of fruit and vegetables a day, include whole grains    & fiber and avoid regularly eating high fat or \"junk\" foods?  Yes    Taking medications regularly:  Yes    Medication side effects:  None    Ability to successfully perform activities of daily living:  No assistance needed    Home Safety:  No safety concerns identified    Hearing Impairment:  No hearing concerns    In the past 6 months, have you been bothered by leaking of urine?  No    In general, how would you rate your overall mental or emotional health?  Excellent      PHQ-2 Total Score: 0    Additional concerns today:  No      Have you ever done Advance Care Planning? (For example, a Health Directive, POLST, or a discussion with a medical provider or your loved ones about your wishes): Yes, advance care planning is on file.       Fall risk  Fallen 2 or more times in the past year?: No  Any fall with injury in the past year?: No  click delete button to remove this line now  Cognitive Screening   1) Repeat 3 items (Leader, Season, Table)    2) Clock draw: NORMAL  3) 3 item recall: Recalls 3 objects  Results: 3 items recalled: COGNITIVE IMPAIRMENT LESS LIKELY    Mini-CogTM Copyright SERGIO Lyons. Licensed by the author for use in [facility]; reprinted with permission (jordon@.[facility]). All rights reserved.      Do you have sleep apnea, excessive snoring or daytime drowsiness?: no    Reviewed and updated as needed this visit by clinical staff   Tobacco  Allergies  " Meds              Reviewed and updated as needed this visit by Provider                 Social History     Tobacco Use     Smoking status: Never     Smokeless tobacco: Never   Vaping Use     Vaping status: Never Used   Substance Use Topics     Alcohol use: Not Currently     Comment: 1 drink every two weeks. During Football Season              4/4/2023    10:24 AM   Alcohol Use   Prescreen: >3 drinks/day or >7 drinks/week? No     Do you have a current opioid prescription? No  Do you use any other controlled substances or medications that are not prescribed by a provider? None              Current providers sharing in care for this patient include:   Patient Care Team:  Chance Guzmán MD as PCP - General (Family Medicine)  Bakari Mckeon MD as MD (Urology)  Chance Guzmán MD as Assigned PCP  Brady Flannery MD as Assigned Cancer Care Provider  Bud Morales MD as MD (Surgery)  Toña Ortega MD as MD (Family Medicine)  Bud Morales MD as Assigned Surgical Provider    The following health maintenance items are reviewed in Epic and correct as of today:  Health Maintenance   Topic Date Due     A1C  02/08/2022     BMP  04/04/2023     MEDICARE ANNUAL WELLNESS VISIT  04/04/2023     DTAP/TDAP/TD IMMUNIZATION (2 - Td or Tdap) 12/20/2023     ANNUAL REVIEW OF HM ORDERS  04/11/2024     FALL RISK ASSESSMENT  04/11/2024     LIPID  04/04/2027     ADVANCE CARE PLANNING  04/11/2028     COLORECTAL CANCER SCREENING  03/09/2031     HEPATITIS C SCREENING  Completed     PHQ-2 (once per calendar year)  Completed     INFLUENZA VACCINE  Completed     Pneumococcal Vaccine: 65+ Years  Completed     ZOSTER IMMUNIZATION  Completed     AORTIC ANEURYSM SCREENING (SYSTEM ASSIGNED)  Completed     COVID-19 Vaccine  Completed     IPV IMMUNIZATION  Aged Out     MENINGITIS IMMUNIZATION  Aged Out     Hernia surgery did well.     Had urologist appt in December. Some leaking of urine with some  "movement.     Blood pressure - feeling fine. Working out a lot. Riding bike in summer.     Right knee - nothing serious and does not bother him but when squats down - feels it.     Review of Systems   Constitutional: Negative for chills and fever.   HENT: Negative for congestion, ear pain, hearing loss and sore throat.    Eyes: Negative for pain and visual disturbance.   Respiratory: Negative for cough and shortness of breath.    Cardiovascular: Negative for chest pain, palpitations and peripheral edema.   Gastrointestinal: Negative for abdominal pain, constipation, diarrhea, heartburn, hematochezia and nausea.   Genitourinary: Negative for dysuria, frequency, genital sores, hematuria, impotence, penile discharge and urgency.   Musculoskeletal: Negative for arthralgias, joint swelling and myalgias.   Skin: Negative for rash.   Neurological: Negative for dizziness, weakness, headaches and paresthesias.   Psychiatric/Behavioral: Negative for mood changes. The patient is not nervous/anxious.      OBJECTIVE:   BP (!) 142/86   Pulse 75   Temp 97.2  F (36.2  C) (Temporal)   Resp 16   Ht 1.819 m (5' 11.61\")   Wt 91.6 kg (202 lb)   SpO2 97%   BMI 27.69 kg/m   Estimated body mass index is 27.69 kg/m  as calculated from the following:    Height as of this encounter: 1.819 m (5' 11.61\").    Weight as of this encounter: 91.6 kg (202 lb).  Physical Exam  GENERAL: healthy, alert and no distress  EYES: Eyes grossly normal to inspection, PERRL and conjunctivae and sclerae normal  HENT: ear canals and TM's normal, nose and mouth without ulcers or lesions  NECK: no adenopathy, no asymmetry, masses, or scars and thyroid normal to palpation  RESP: lungs clear to auscultation - no rales, rhonchi or wheezes  CV: regular rate and rhythm, normal S1 S2, no S3 or S4, no murmur, click or rub, no peripheral edema and peripheral pulses strong  ABDOMEN: soft, nontender, no hepatosplenomegaly, no masses and bowel sounds normal   (male): " "normal male genitalia without lesions or urethral discharge, no hernia  MS: no gross musculoskeletal defects noted, no edema  SKIN: no suspicious lesions or rashes  NEURO: Normal strength and tone, mentation intact and speech normal  PSYCH: mentation appears normal, affect normal/bright    ASSESSMENT / PLAN:   (Z00.00) Encounter for Medicare annual wellness exam  (primary encounter diagnosis)  Comment:    Plan:      (I10) Uncontrolled hypertension  Comment:  bp better than before. Still not at goal but ok to stick to same rx for now. On multiple antihypertensive drugs, eating healthy and doing exercises regularly.  Plan: BASIC METABOLIC PANEL             (I10) Benign essential HTN  Comment:  See above.   Plan: atenolol (TENORMIN) 100 MG tablet, amLODIPine         (NORVASC) 10 MG tablet             (R73.01) Impaired fasting blood sugar  Comment:    Plan: HEMOGLOBIN A1C             (N52.31) Erectile dysfunction after radical prostatectomy  Comment:  Patient is tolerating current medication without any major side effects of concerns and current dose seems reasonable too.  Current medication regime is effective. Continue current treatment without any changes.   Plan: sildenafil (REVATIO) 20 MG tablet             (E78.5) Hyperlipidemia LDL goal <130  Comment:  High ascvd risk and should consider statin. Monitor for now.   Plan: Lipid panel reflex to direct LDL Fasting          COUNSELING:  Reviewed preventive health counseling, as reflected in patient instructions      BMI:   Estimated body mass index is 27.69 kg/m  as calculated from the following:    Height as of this encounter: 1.819 m (5' 11.61\").    Weight as of this encounter: 91.6 kg (202 lb).         He reports that he has never smoked. He has never used smokeless tobacco.      Appropriate preventive services were discussed with this patient, including applicable screening as appropriate for cardiovascular disease, diabetes, osteopenia/osteoporosis, and glaucoma. "  As appropriate for age/gender, discussed screening for colorectal cancer, prostate cancer, breast cancer, and cervical cancer. Checklist reviewing preventive services available has been given to the patient.    Reviewed patients plan of care and provided an AVS. The Basic Care Plan (routine screening as documented in Health Maintenance) for Dion meets the Care Plan requirement. This Care Plan has been established and reviewed with the Patient.        Chance Guzmán MD, MD  [facility]    Identified Health Risks:    I have reviewed Opioid Use Disorder and Substance Use Disorder risk factors and made any needed referrals.

## 2023-04-11 NOTE — PATIENT INSTRUCTIONS
Patient Education   Personalized Prevention Plan  You are due for the preventive services outlined below.  Your care team is available to assist you in scheduling these services.  If you have already completed any of these items, please share that information with your care team to update in your medical record.  Health Maintenance Due   Topic Date Due     A1C Lab  02/08/2022     Basic Metabolic Panel  04/04/2023     ANNUAL REVIEW OF HM ORDERS  04/04/2023     Annual Wellness Visit  04/04/2023

## 2023-04-17 DIAGNOSIS — N52.31 ERECTILE DYSFUNCTION AFTER RADICAL PROSTATECTOMY: ICD-10-CM

## 2023-04-18 ENCOUNTER — TELEPHONE (OUTPATIENT)
Dept: FAMILY MEDICINE | Facility: CLINIC | Age: 68
End: 2023-04-18
Payer: COMMERCIAL

## 2023-04-18 RX ORDER — SILDENAFIL CITRATE 20 MG/1
20 TABLET ORAL DAILY PRN
Qty: 30 TABLET | Refills: 3 | OUTPATIENT
Start: 2023-04-18

## 2023-04-18 NOTE — TELEPHONE ENCOUNTER
Came as a refill request but its just a message from pharmacy to initiate PA. See 4/18 TE    Alternative Requested:PRIOR AUTH REQUIRED. INSURANCE MAY COVER DUE TO ICD 10 C61. RECOMMEND SUBMITTING TO INS. CALL [phone]. THANK YOU!    Kacey Mccall, BSN RN  [facility]

## 2023-04-18 NOTE — TELEPHONE ENCOUNTER
Request from pharmacy to initiate PA for sildenafil    Alternative Requested:PRIOR AUTH REQUIRED. INSURANCE MAY COVER DUE TO ICD 10 C61. RECOMMEND SUBMITTING TO INS. CALL [phone]. THANK YOU!    TANMAY LopezN RN  [facility]

## 2023-04-20 NOTE — TELEPHONE ENCOUNTER
Central Prior Authorization Team   Phone: [phone]    PA Initiation    Medication: sildenafil (REVATIO) 20 MG tablet  Insurance Company: [employer] - Phone [phone] Fax [phone]  Pharmacy Filling the Rx: [id number] [place] [facility] - [address]  Filling Pharmacy Phone: [phone]  Filling Pharmacy Fax:    Start Date: 4/20/2023

## 2023-04-22 NOTE — TELEPHONE ENCOUNTER
PRIOR AUTHORIZATION DENIED    Medication: sildenafil (REVATIO) 20 MG tablet-PA DENIED     Denial Date: 4/21/2023    Denial Rational:        Appeal Information:

## 2023-04-24 NOTE — TELEPHONE ENCOUNTER
Sent pt a [employer] message letting him know    Kacey Mccall, BSN RN  [facility]

## 2023-04-25 ENCOUNTER — MYC MEDICAL ADVICE (OUTPATIENT)
Dept: FAMILY MEDICINE | Facility: CLINIC | Age: 68
End: 2023-04-25
Payer: COMMERCIAL

## 2023-04-25 DIAGNOSIS — E78.5 HYPERLIPIDEMIA LDL GOAL <130: Primary | ICD-10-CM

## 2023-04-26 NOTE — TELEPHONE ENCOUNTER
Atorvastatin pended. [facility] and sign rx.   Leg cramps in some people and less likely by taking it at bed time.  He can have phone visit with me if he continue to have questions.   Recommend follow up in 6 months with provider and should have repeat labs at that time.

## 2023-04-26 NOTE — TELEPHONE ENCOUNTER
Dr. Guzmán --    Patient states to go ahead and order the cholesterol medication. Would like to know side effects?    Note 4/11/2023: Office Visit: Encounter for Medicare annual wellness exam:  (E78.5) Hyperlipidemia LDL goal <130  Comment:  High ascvd risk and should consider statin. Monitor for now.   Plan: Lipid panel reflex to direct LDL Fasting       TANMAY PinonN NIKITA  [facility]

## 2023-04-26 NOTE — TELEPHONE ENCOUNTER
Sent this provider response to pt.  Asked for pharmacy.  rx needs to be sent in.  NIKITA Terrazas

## 2023-04-27 RX ORDER — ATORVASTATIN CALCIUM 20 MG/1
20 TABLET, FILM COATED ORAL DAILY
Qty: 90 TABLET | Refills: 3 | Status: SHIPPED | OUTPATIENT
Start: 2023-04-27 | End: 2024-04-11

## 2023-06-29 ENCOUNTER — OFFICE VISIT (OUTPATIENT)
Dept: UROLOGY | Facility: CLINIC | Age: 68
End: 2023-06-29
Payer: COMMERCIAL

## 2023-06-29 VITALS
BODY MASS INDEX: 26.41 KG/M2 | WEIGHT: 195 LBS | HEART RATE: 68 BPM | DIASTOLIC BLOOD PRESSURE: 80 MMHG | SYSTOLIC BLOOD PRESSURE: 140 MMHG | OXYGEN SATURATION: 98 % | HEIGHT: 72 IN

## 2023-06-29 DIAGNOSIS — C61 PROSTATE CANCER (H): Primary | ICD-10-CM

## 2023-06-29 LAB — PSA SERPL-MCNC: 0.05 UG/L (ref 0–4)

## 2023-06-29 PROCEDURE — 84153 ASSAY OF PSA TOTAL: CPT | Performed by: UROLOGY

## 2023-06-29 PROCEDURE — 99213 OFFICE O/P EST LOW 20 MIN: CPT | Performed by: UROLOGY

## 2023-06-29 PROCEDURE — 36415 COLL VENOUS BLD VENIPUNCTURE: CPT | Performed by: UROLOGY

## 2023-06-29 ASSESSMENT — PAIN SCALES - GENERAL: PAINLEVEL: NO PAIN (0)

## 2023-06-29 NOTE — NURSING NOTE
Chief Complaint   Patient presents with     history of prostate cancer     PSA review   Ingrid Tran LPN

## 2023-06-29 NOTE — PROGRESS NOTES
CHIEF COMPLAINT   It was my pleasure to see Dion Bowman who is a 67 year old male for follow-up of Prostate Cancer.      HPI  Dion Bowman is a very pleasant 67 year old male who presents with a history of Prostate Cancer.  Had RALP completed 4/29/2020. Abie 4+3 disease, pT2. Doing well. Excellent return of continence. PSA jerod to 0.14 6/2021. Salvage radiation completed 9/2021 and tolerated this very well.  PSA now undetectable. Good return of continence and overall doing well.     PSA  6/29/2023 - 0.05  4/11/2023 - 0.06  12/29/2022 - <0.04  6/30/2022 - <0.04  12/9/2021 - <0.04  6/18/2021 - 0.14  3/9/2021 - 0.05  12/18/2020 - 0.04  9/4/2020 - <0.04     RALP 4/29/2020  FINAL DIAGNOSIS:   A: Prostate and bilateral seminal vesicles: Robotic assisted Radical   prostatectomy:   - Prostatic adenocarcinoma, mixed acinar and ductal types, Abie score   4+3 = 7, grade group 3, involving   approximately 20% of total prostatic volume   - Tumor is confined to the prostate   - Resection margins negative for carcinoma   - Benign bilateral seminal vesicles   - See synoptic report for details     B: Lymph nodes, bilateral pelvic: Dissection:   - Nine lymph nodes, negative for metastatic carcinoma (0/9)    PHYSICAL EXAM  Patient is a 67 year old  male   Vitals: Blood pressure (!) 140/80, pulse 68, height 1.829 m (6'), weight 88.5 kg (195 lb), SpO2 98 %.  General Appearance Adult: Body mass index is 26.45 kg/m .  Alert, no acute distress, oriented  Lungs: no respiratory distress, or pursed lip breathing  Abdomen: soft, nontender, no organomegaly or masses  Back: no CVAT  Neuro: Alert, oriented, speech and mentation normal  Psych: affect and mood normal    Outside and Past Medical records:  Review of the result(s) of each unique test - PSA     ASSESSMENT and PLAN  67 year old male with stage pT2N0, Abie 4+3=7 prostate cancer, s/p RALP completed 4/29/2020. Negative margins. His PSA jerod to 0.14 and he completed salvage  radiotherapy 9/2021. Tolerated this well. No changes in urinary control. PSA detectable at this point, but very low at 0.05. Will continue to monitor.     - Follow-up 6 months with PSA    20 minutes spent on the date of the encounter doing chart review, history and exam, documentation and further activities as noted above.    Brady Flannery MD  Urology  [facility]

## 2023-06-29 NOTE — Clinical Note
6/29/2023       RE: Dion Bowman  [address]  [facility] [address]     Dear Colleague,    Thank you for referring your patient, Dion Bowman, to the [facility] at [facility]. Please see a copy of my visit note below.      CHIEF COMPLAINT   It was my pleasure to see Dion Bowman who is a 67 year old male for follow-up of Prostate Cancer.      HPI  Dion Bowman is a very pleasant 67 year old male who presents with a history of Prostate Cancer.  Had RALP completed 4/29/2020. [place] 4+3 disease, pT2. Doing well. Excellent return of continence. PSA jerod to 0.14 6/2021. Salvage radiation completed 9/2021 and tolerated this very well.  PSA now undetectable. Good return of continence and overall doing well.     PSA  4/11/2023 - 0.06  12/29/2022 - <0.04  6/30/2022 - <0.04  12/9/2021 - <0.04  6/18/2021 - 0.14  3/9/2021 - 0.05  12/18/2020 - 0.04  9/4/2020 - <0.04     RALP 4/29/2020  FINAL DIAGNOSIS:   A: Prostate and bilateral seminal vesicles: Robotic assisted Radical   prostatectomy:   - Prostatic adenocarcinoma, mixed acinar and ductal types, [place] score   4+3 = 7, grade group 3, involving   approximately 20% of total prostatic volume   - Tumor is confined to the prostate   - Resection margins negative for carcinoma   - Benign bilateral seminal vesicles   - See synoptic report for details     B: Lymph nodes, bilateral pelvic: Dissection:   - Nine lymph nodes, negative for metastatic carcinoma (0/9)    PHYSICAL EXAM  Patient is a 67 year old  male   Vitals: Blood pressure (!) 140/80, pulse 68, height 1.829 m (6'), weight 88.5 kg (195 lb), SpO2 98 %.  General Appearance Adult: Body mass index is 26.45 kg/m .  Alert, no acute distress, oriented  Lungs: no respiratory distress, or pursed lip breathing  Abdomen: soft, nontender, no organomegaly or masses; ***  Back: *** CVAT  Neuro: Alert, oriented, speech and mentation normal  Psych: affect and  mood normal  : {CAL EXAM MALE GENITAL:709105}    Outside and Past Medical records:  Review of the result(s) of each unique test - PSA     ASSESSMENT and PLAN  67 year old male with stage pT2N0, [place] 4+3=7 prostate cancer, s/p RALP completed 4/29/2020. Negative margins. His PSA jerod to 0.14 and he completed salvage radiotherapy 9/2021. Tolerated this well. No changes in urinary control. PSA continues to be undetectable with no evidence of recurrence.     - Follow-up 6 months with PSA      *** minutes spent on the date of the encounter doing chart review, history and exam, documentation and further activities as noted above.    Brady Flannery MD  Urology  [facility]        CHIEF COMPLAINT   It was my pleasure to see Dion Bowman who is a 67 year old male for follow-up of Prostate Cancer.      HPI  Dion Bowman is a very pleasant 67 year old male who presents with a history of Prostate Cancer.  Had RALP completed 4/29/2020. Deb 4+3 disease, pT2. Doing well. Excellent return of continence. PSA jerod to 0.14 6/2021. Salvage radiation completed 9/2021 and tolerated this very well.  PSA now undetectable. Good return of continence and overall doing well.     PSA  6/29/2023 - 0.05  4/11/2023 - 0.06  12/29/2022 - <0.04  6/30/2022 - <0.04  12/9/2021 - <0.04  6/18/2021 - 0.14  3/9/2021 - 0.05  12/18/2020 - 0.04  9/4/2020 - <0.04     RALP 4/29/2020  FINAL DIAGNOSIS:   A: Prostate and bilateral seminal vesicles: Robotic assisted Radical   prostatectomy:   - Prostatic adenocarcinoma, mixed acinar and ductal types, [place] score   4+3 = 7, grade group 3, involving   approximately 20% of total prostatic volume   - Tumor is confined to the prostate   - Resection margins negative for carcinoma   - Benign bilateral seminal vesicles   - See synoptic report for details     B: Lymph nodes, bilateral pelvic: Dissection:   - Nine lymph nodes, negative for metastatic carcinoma (0/9)    PHYSICAL EXAM  Patient is  a 67 year old  male   Vitals: Blood pressure (!) 140/80, pulse 68, height 1.829 m (6'), weight 88.5 kg (195 lb), SpO2 98 %.  General Appearance Adult: Body mass index is 26.45 kg/m .  Alert, no acute distress, oriented  Lungs: no respiratory distress, or pursed lip breathing  Abdomen: soft, nontender, no organomegaly or masses  Back: no CVAT  Neuro: Alert, oriented, speech and mentation normal  Psych: affect and mood normal    Outside and Past Medical records:  Review of the result(s) of each unique test - PSA     ASSESSMENT and PLAN  67 year old male with stage pT2N0, Deb 4+3=7 prostate cancer, s/p RALP completed 4/29/2020. Negative margins. His PSA jerod to 0.14 and he completed salvage radiotherapy 9/2021. Tolerated this well. No changes in urinary control. PSA detectable at this point, but very low at 0.05. Will continue to monitor.     - Follow-up 6 months with PSA    20 minutes spent on the date of the encounter doing chart review, history and exam, documentation and further activities as noted above.    Brady Flannery MD  Urology  [facility]        Again, thank you for allowing me to participate in the care of your patient.      Sincerely,    Brady Flannery MD

## 2023-10-19 ENCOUNTER — MYC MEDICAL ADVICE (OUTPATIENT)
Dept: FAMILY MEDICINE | Facility: CLINIC | Age: 68
End: 2023-10-19
Payer: COMMERCIAL

## 2023-10-19 NOTE — TELEPHONE ENCOUNTER
Message sent via [employer].    Krysta Sage, RN, BSN, PHN  [facility]  [phone]

## 2023-11-16 ENCOUNTER — OFFICE VISIT (OUTPATIENT)
Dept: FAMILY MEDICINE | Facility: CLINIC | Age: 68
End: 2023-11-16
Payer: COMMERCIAL

## 2023-11-16 VITALS
HEIGHT: 72 IN | SYSTOLIC BLOOD PRESSURE: 138 MMHG | HEART RATE: 64 BPM | BODY MASS INDEX: 26.4 KG/M2 | TEMPERATURE: 96.8 F | WEIGHT: 194.9 LBS | DIASTOLIC BLOOD PRESSURE: 68 MMHG | RESPIRATION RATE: 21 BRPM | OXYGEN SATURATION: 97 %

## 2023-11-16 DIAGNOSIS — E78.5 HYPERLIPIDEMIA LDL GOAL <130: ICD-10-CM

## 2023-11-16 DIAGNOSIS — G89.29 CHRONIC PAIN OF RIGHT KNEE: Primary | ICD-10-CM

## 2023-11-16 DIAGNOSIS — I10 BENIGN ESSENTIAL HTN: ICD-10-CM

## 2023-11-16 DIAGNOSIS — M25.561 CHRONIC PAIN OF RIGHT KNEE: Primary | ICD-10-CM

## 2023-11-16 DIAGNOSIS — R73.01 IMPAIRED FASTING BLOOD SUGAR: ICD-10-CM

## 2023-11-16 LAB
ALBUMIN SERPL BCG-MCNC: 4.5 G/DL (ref 3.5–5.2)
ALP SERPL-CCNC: 85 U/L (ref 40–150)
ALT SERPL W P-5'-P-CCNC: 20 U/L (ref 0–70)
ANION GAP SERPL CALCULATED.3IONS-SCNC: 9 MMOL/L (ref 7–15)
AST SERPL W P-5'-P-CCNC: 30 U/L (ref 0–45)
BILIRUB SERPL-MCNC: 0.7 MG/DL
BUN SERPL-MCNC: 16.5 MG/DL (ref 8–23)
CALCIUM SERPL-MCNC: 9.7 MG/DL (ref 8.8–10.2)
CHLORIDE SERPL-SCNC: 101 MMOL/L (ref 98–107)
CHOLEST SERPL-MCNC: 156 MG/DL
CREAT SERPL-MCNC: 1.14 MG/DL (ref 0.67–1.17)
DEPRECATED HCO3 PLAS-SCNC: 27 MMOL/L (ref 22–29)
EGFRCR SERPLBLD CKD-EPI 2021: 70 ML/MIN/1.73M2
GLUCOSE SERPL-MCNC: 127 MG/DL (ref 70–99)
HBA1C MFR BLD: 5.1 % (ref 0–5.6)
HDLC SERPL-MCNC: 62 MG/DL
LDLC SERPL CALC-MCNC: 74 MG/DL
NONHDLC SERPL-MCNC: 94 MG/DL
POTASSIUM SERPL-SCNC: 4.6 MMOL/L (ref 3.4–5.3)
PROT SERPL-MCNC: 7.2 G/DL (ref 6.4–8.3)
SODIUM SERPL-SCNC: 137 MMOL/L (ref 135–145)
TRIGL SERPL-MCNC: 98 MG/DL

## 2023-11-16 PROCEDURE — 80061 LIPID PANEL: CPT | Performed by: FAMILY MEDICINE

## 2023-11-16 PROCEDURE — 80053 COMPREHEN METABOLIC PANEL: CPT | Performed by: FAMILY MEDICINE

## 2023-11-16 PROCEDURE — 83036 HEMOGLOBIN GLYCOSYLATED A1C: CPT | Performed by: FAMILY MEDICINE

## 2023-11-16 PROCEDURE — 36415 COLL VENOUS BLD VENIPUNCTURE: CPT | Performed by: FAMILY MEDICINE

## 2023-11-16 PROCEDURE — 99214 OFFICE O/P EST MOD 30 MIN: CPT | Performed by: FAMILY MEDICINE

## 2023-11-16 RX ORDER — RESPIRATORY SYNCYTIAL VIRUS VACCINE 120MCG/0.5
0.5 KIT INTRAMUSCULAR ONCE
Qty: 1 EACH | Refills: 0 | Status: CANCELLED | OUTPATIENT
Start: 2023-11-16 | End: 2023-11-16

## 2023-11-16 ASSESSMENT — PAIN SCALES - GENERAL: PAINLEVEL: NO PAIN (0)

## 2023-11-16 NOTE — PROGRESS NOTES
Assessment & Plan     Chronic pain of right knee  Most likely a Baker's cyst.  Osteoarthritis may be contributing.  Discussed we can consider an x-ray, cortisone shot, referral to sports medicine depending upon x-ray resolved versus watchful waiting for now.  He is interested in waiting for now and will notify me if he would like to get a referral before his next visit with me in 6 months.    Benign essential HTN  Blood pressure is under good control today.  On multiple antihypertensive medication and previously blood pressure has been uncontrolled.  No changes in Rx today.    Hyperlipidemia LDL goal <130  Started on statin.  Will do repeat lipid panel today.  - Lipid panel reflex to direct LDL Fasting; Future  - Comprehensive metabolic panel (BMP + Alb, Alk Phos, ALT, AST, Total. Bili, TP); Future  - Lipid panel reflex to direct LDL Fasting  - Comprehensive metabolic panel (BMP + Alb, Alk Phos, ALT, AST, Total. Bili, TP)    Impaired fasting blood sugar  A1c has been stable in the past but glucose has been high.  Repeat A1c today.  - Hemoglobin A1c; Future  - Hemoglobin A1c             BMI:   Estimated body mass index is 26.43 kg/m  as calculated from the following:    Height as of this encounter: 1.829 m (6').    Weight as of this encounter: 88.4 kg (194 lb 14.4 oz).           Chance Guzmán MD, MD  [facility]    Luis Ashley is a 68 year old, presenting for the following health issues:  Recheck Medication        11/16/2023     8:11 AM   Additional Questions   Roomed by EVELINE Ambrosio   Accompanied by self         11/16/2023     8:11 AM   Patient Reported Additional Medications   Patient reports taking the following new medications none       History of Present Illness       Hyperlipidemia:  He presents for follow up of hyperlipidemia.   He is taking medication to lower cholesterol. He is not having myalgia or other side effects to statin medications.    He eats 2-3 servings  of fruits and vegetables daily.He consumes 0 sweetened beverage(s) daily.He exercises with enough effort to increase his heart rate 30 to 60 minutes per day.  He exercises with enough effort to increase his heart rate 7 days per week.   He is taking medications regularly.     6 months old grandson.   Started [employer].     Right knee - felt back of knee few weeks ago. Bothered him for a week and back to pickelball.   No history of knee arthritis. Had bone scan which showed some degenerative changes.     Review of Systems         Objective    /68 (BP Location: Right arm, Patient Position: Sitting, Cuff Size: Adult Regular)   Pulse 64   Temp 96.8  F (36  C) (Temporal)   Resp 21   Ht 1.829 m (6')   Wt 88.4 kg (194 lb 14.4 oz)   SpO2 97%   BMI 26.43 kg/m    Body mass index is 26.43 kg/m .  Physical Exam   Right popliteal fossa mild fullness.  No any focal tender spot.

## 2024-01-04 ENCOUNTER — OFFICE VISIT (OUTPATIENT)
Dept: UROLOGY | Facility: CLINIC | Age: 69
End: 2024-01-04
Payer: COMMERCIAL

## 2024-01-04 VITALS
HEART RATE: 70 BPM | HEIGHT: 72 IN | SYSTOLIC BLOOD PRESSURE: 142 MMHG | OXYGEN SATURATION: 98 % | WEIGHT: 195 LBS | DIASTOLIC BLOOD PRESSURE: 82 MMHG | BODY MASS INDEX: 26.41 KG/M2

## 2024-01-04 DIAGNOSIS — C61 PROSTATE CANCER (H): Primary | ICD-10-CM

## 2024-01-04 LAB — PSA SERPL-MCNC: 0.18 UG/L (ref 0–4)

## 2024-01-04 PROCEDURE — 36415 COLL VENOUS BLD VENIPUNCTURE: CPT | Performed by: UROLOGY

## 2024-01-04 PROCEDURE — 99213 OFFICE O/P EST LOW 20 MIN: CPT | Performed by: UROLOGY

## 2024-01-04 PROCEDURE — 84153 ASSAY OF PSA TOTAL: CPT | Performed by: UROLOGY

## 2024-01-04 ASSESSMENT — PAIN SCALES - GENERAL: PAINLEVEL: NO PAIN (0)

## 2024-01-04 NOTE — PROGRESS NOTES
CHIEF COMPLAINT   It was my pleasure to see Dion Bowman who is a 68 year old male for follow-up of Prostate Cancer.      HPI  Dion Bowman is a very pleasant 68 year old male who presents with a history of Prostate Cancer.  Had RALP completed 4/29/2020. [place] 4+3 disease, pT2. Doing well. Excellent return of continence. PSA jerod to 0.14 6/2021. Salvage radiation completed 9/2021 and tolerated this very well.    PSA is now rising again, most recently to 0.18.     PSA  1/4/2024 - 0.18  6/29/2023 - 0.05  4/11/2023 - 0.06  12/29/2022 - <0.04  6/30/2022 - <0.04  12/9/2021 - <0.04  6/18/2021 - 0.14  3/9/2021 - 0.05  12/18/2020 - 0.04  9/4/2020 - <0.04     RALP 4/29/2020  FINAL DIAGNOSIS:   A: Prostate and bilateral seminal vesicles: Robotic assisted Radical   prostatectomy:   - Prostatic adenocarcinoma, mixed acinar and ductal types, Deb score   4+3 = 7, grade group 3, involving   approximately 20% of total prostatic volume   - Tumor is confined to the prostate   - Resection margins negative for carcinoma   - Benign bilateral seminal vesicles   - See synoptic report for details     B: Lymph nodes, bilateral pelvic: Dissection:   - Nine lymph nodes, negative for metastatic carcinoma (0/9)    PHYSICAL EXAM  Patient is a 68 year old  male   Vitals: Blood pressure (!) 142/82, pulse 70, height 1.829 m (6'), weight 88.5 kg (195 lb), SpO2 98%.  General Appearance Adult: Body mass index is 26.45 kg/m .  Alert, no acute distress, oriented  Lungs: no respiratory distress, or pursed lip breathing  Abdomen: soft, nontender, no organomegaly or masses;  Back: no CVAT  Neuro: Alert, oriented, speech and mentation normal  Psych: affect and mood normal    Outside and Past Medical records:  Review of the result(s) of each unique test - PSA     ASSESSMENT and PLAN  68 year old male with stage pT2N0, Deb 4+3=7 prostate cancer, s/p RALP completed 4/29/2020. Negative margins. His PSA jerod to 0.14 and he completed salvage  radiotherapy 9/2021. Tolerated this well. No changes in urinary control. PSA detectable at this point, and has risen to 0.18. We reviewed this is consistent with biochemical recurrence, and given his prior surgery and radiation, may represent metastatic disease. We reviewed the role for PSMA PET imaging, but that PSA remains a bit too low for this to accurately define area of recurrence. Will plan PSA in 3 months and likely PSMA PET scan if this continues to rise. We briefly discussed possible additional treatment strategies, including ADT should PSA continue to rise.     - Follow-up 3 months with PSA    15 minutes spent on the date of the encounter doing chart review, history and exam, documentation and further activities as noted above.    Brady Flannery MD  Urology  [facility]

## 2024-01-04 NOTE — Clinical Note
1/4/2024       RE: Dion Bowman  [address]  [facility] [address]     Dear Colleague,    Thank you for referring your patient, Dion Bowman, to the [facility] at [facility]. Please see a copy of my visit note below.      CHIEF COMPLAINT   It was my pleasure to see Dion Bowman who is a 68 year old male for follow-up of Prostate Cancer.      HPI  Dion Bowman is a very pleasant 68 year old male who presents with a history of Prostate Cancer.  Had RALP completed 4/29/2020. [place] 4+3 disease, pT2. Doing well. Excellent return of continence. PSA jerod to 0.14 6/2021. Salvage radiation completed 9/2021 and tolerated this very well.    PSA now undetectable. Good return of continence and overall doing well.     PSA  1/4/2024 - 0.18  6/29/2023 - 0.05  4/11/2023 - 0.06  12/29/2022 - <0.04  6/30/2022 - <0.04  12/9/2021 - <0.04  6/18/2021 - 0.14  3/9/2021 - 0.05  12/18/2020 - 0.04  9/4/2020 - <0.04     RALP 4/29/2020  FINAL DIAGNOSIS:   A: Prostate and bilateral seminal vesicles: Robotic assisted Radical   prostatectomy:   - Prostatic adenocarcinoma, mixed acinar and ductal types, Deb score   4+3 = 7, grade group 3, involving   approximately 20% of total prostatic volume   - Tumor is confined to the prostate   - Resection margins negative for carcinoma   - Benign bilateral seminal vesicles   - See synoptic report for details     B: Lymph nodes, bilateral pelvic: Dissection:   - Nine lymph nodes, negative for metastatic carcinoma (0/9)    PHYSICAL EXAM  Patient is a 68 year old  male   Vitals: Blood pressure (!) 142/82, pulse 70, height 1.829 m (6'), weight 88.5 kg (195 lb), SpO2 98%.  General Appearance Adult: Body mass index is 26.45 kg/m .  Alert, no acute distress, oriented  Lungs: no respiratory distress, or pursed lip breathing  Abdomen: soft, nontender, no organomegaly or masses; ***  Back: *** CVAT  Neuro: Alert, oriented, speech and  mentation normal  Psych: affect and mood normal  : {CAL EXAM MALE GENITAL:388667}    Outside and Past Medical records:  Review of the result(s) of each unique test - PSA     ASSESSMENT and PLAN  68 year old male with stage pT2N0, [place] 4+3=7 prostate cancer, s/p RALP completed 4/29/2020. Negative margins. His PSA jerod to 0.14 and he completed salvage radiotherapy 9/2021. Tolerated this well. No changes in urinary control. PSA detectable at this point, but very low at 0.05. Will continue to monitor.     - Follow-up 6 months with PSA      *** minutes spent on the date of the encounter doing chart review, history and exam, documentation and further activities as noted above.    Brady Flannery MD  Urology  [facility]        CHIEF COMPLAINT   It was my pleasure to see Dion Bowman who is a 68 year old male for follow-up of Prostate Cancer.      HPI  Dion Bowman is a very pleasant 68 year old male who presents with a history of Prostate Cancer.  Had RALP completed 4/29/2020. [place] 4+3 disease, pT2. Doing well. Excellent return of continence. PSA jerod to 0.14 6/2021. Salvage radiation completed 9/2021 and tolerated this very well.    PSA now undetectable. Good return of continence and overall doing well.     PSA  1/4/2024 - 0.18  6/29/2023 - 0.05  4/11/2023 - 0.06  12/29/2022 - <0.04  6/30/2022 - <0.04  12/9/2021 - <0.04  6/18/2021 - 0.14  3/9/2021 - 0.05  12/18/2020 - 0.04  9/4/2020 - <0.04     RALP 4/29/2020  FINAL DIAGNOSIS:   A: Prostate and bilateral seminal vesicles: Robotic assisted Radical   prostatectomy:   - Prostatic adenocarcinoma, mixed acinar and ductal types, [place] score   4+3 = 7, grade group 3, involving   approximately 20% of total prostatic volume   - Tumor is confined to the prostate   - Resection margins negative for carcinoma   - Benign bilateral seminal vesicles   - See synoptic report for details     B: Lymph nodes, bilateral pelvic: Dissection:   - Nine lymph nodes,  negative for metastatic carcinoma (0/9)    PHYSICAL EXAM  Patient is a 68 year old  male   Vitals: Blood pressure (!) 142/82, pulse 70, height 1.829 m (6'), weight 88.5 kg (195 lb), SpO2 98%.  General Appearance Adult: Body mass index is 26.45 kg/m .  Alert, no acute distress, oriented  Lungs: no respiratory distress, or pursed lip breathing  Abdomen: soft, nontender, no organomegaly or masses; ***  Back: *** CVAT  Neuro: Alert, oriented, speech and mentation normal  Psych: affect and mood normal  : {CAL EXAM MALE GENITAL:507220}    Outside and Past Medical records:  Review of the result(s) of each unique test - PSA     ASSESSMENT and PLAN  68 year old male with stage pT2N0, [place] 4+3=7 prostate cancer, s/p RALP completed 4/29/2020. Negative margins. His PSA jerod to 0.14 and he completed salvage radiotherapy 9/2021. Tolerated this well. No changes in urinary control. PSA detectable at this point, and has risen to 0.18. We reviewed this is consistent with biochemical recurrence, and given his prior surgery and radiation, may represent metastatic disease. We reviewed the role for PSMA PET imaging, but that PSA remains a bit too low for this to be     - Follow-up 3 months with PSA      *** minutes spent on the date of the encounter doing chart review, history and exam, documentation and further activities as noted above.    Brady Flannery MD  Urology  [facility]        Again, thank you for allowing me to participate in the care of your patient.      Sincerely,    Brady Flannery MD

## 2024-03-11 DIAGNOSIS — I10 BENIGN ESSENTIAL HTN: ICD-10-CM

## 2024-03-11 RX ORDER — LISINOPRIL AND HYDROCHLOROTHIAZIDE 12.5; 2 MG/1; MG/1
TABLET ORAL
Qty: 180 TABLET | Refills: 3 | Status: SHIPPED | OUTPATIENT
Start: 2024-03-11

## 2024-03-12 ENCOUNTER — PATIENT OUTREACH (OUTPATIENT)
Dept: CARE COORDINATION | Facility: CLINIC | Age: 69
End: 2024-03-12
Payer: COMMERCIAL

## 2024-03-26 ENCOUNTER — PATIENT OUTREACH (OUTPATIENT)
Dept: CARE COORDINATION | Facility: CLINIC | Age: 69
End: 2024-03-26
Payer: COMMERCIAL

## 2024-04-08 SDOH — HEALTH STABILITY: PHYSICAL HEALTH: ON AVERAGE, HOW MANY MINUTES DO YOU ENGAGE IN EXERCISE AT THIS LEVEL?: 60 MIN

## 2024-04-08 SDOH — HEALTH STABILITY: PHYSICAL HEALTH: ON AVERAGE, HOW MANY DAYS PER WEEK DO YOU ENGAGE IN MODERATE TO STRENUOUS EXERCISE (LIKE A BRISK WALK)?: 7 DAYS

## 2024-04-08 ASSESSMENT — SOCIAL DETERMINANTS OF HEALTH (SDOH): HOW OFTEN DO YOU GET TOGETHER WITH FRIENDS OR RELATIVES?: ONCE A WEEK

## 2024-04-11 ENCOUNTER — OFFICE VISIT (OUTPATIENT)
Dept: UROLOGY | Facility: CLINIC | Age: 69
End: 2024-04-11
Payer: COMMERCIAL

## 2024-04-11 VITALS — DIASTOLIC BLOOD PRESSURE: 77 MMHG | HEART RATE: 82 BPM | SYSTOLIC BLOOD PRESSURE: 142 MMHG | OXYGEN SATURATION: 95 %

## 2024-04-11 DIAGNOSIS — C61 PROSTATE CANCER (H): Primary | ICD-10-CM

## 2024-04-11 DIAGNOSIS — E78.5 HYPERLIPIDEMIA LDL GOAL <130: ICD-10-CM

## 2024-04-11 LAB — PSA SERPL-MCNC: 0.19 UG/L (ref 0–4)

## 2024-04-11 PROCEDURE — 99213 OFFICE O/P EST LOW 20 MIN: CPT | Performed by: UROLOGY

## 2024-04-11 PROCEDURE — 36415 COLL VENOUS BLD VENIPUNCTURE: CPT | Performed by: UROLOGY

## 2024-04-11 PROCEDURE — 84153 ASSAY OF PSA TOTAL: CPT | Performed by: UROLOGY

## 2024-04-11 RX ORDER — ATORVASTATIN CALCIUM 20 MG/1
20 TABLET, FILM COATED ORAL DAILY
Qty: 90 TABLET | Refills: 1 | Status: SHIPPED | OUTPATIENT
Start: 2024-04-11 | End: 2024-04-15

## 2024-04-11 NOTE — PROGRESS NOTES
CHIEF COMPLAINT   It was my pleasure to see Dion Bowman who is a 68 year old male for follow-up of Prostate Cancer.      HPI  Dion Bowman is a very pleasant 68 year old male who presents with a history of Prostate Cancer.  Had RALP completed 4/29/2020. [place] 4+3 disease, pT2. Doing well. Excellent return of continence. PSA jerod to 0.14 6/2021. Salvage radiation completed 9/2021 and tolerated this very well.     PSA is now rising again, most recently to 0.18.     PSA  4/11/2024 - 0.19  1/4/2024 - 0.18  6/29/2023 - 0.05  4/11/2023 - 0.06  12/29/2022 - <0.04  6/30/2022 - <0.04  12/9/2021 - <0.04  6/18/2021 - 0.14  3/9/2021 - 0.05  12/18/2020 - 0.04  9/4/2020 - <0.04     RALP 4/29/2020  FINAL DIAGNOSIS:   A: Prostate and bilateral seminal vesicles: Robotic assisted Radical   prostatectomy:   - Prostatic adenocarcinoma, mixed acinar and ductal types, [place] score   4+3 = 7, grade group 3, involving   approximately 20% of total prostatic volume   - Tumor is confined to the prostate   - Resection margins negative for carcinoma   - Benign bilateral seminal vesicles   - See synoptic report for details     B: Lymph nodes, bilateral pelvic: Dissection:   - Nine lymph nodes, negative for metastatic carcinoma (0/9)    PHYSICAL EXAM  Patient is a 68 year old  male   Vitals: Blood pressure (!) 142/77, pulse 82, SpO2 95%.  General Appearance Adult: There is no height or weight on file to calculate BMI.  Alert, no acute distress, oriented  Lungs: no respiratory distress, or pursed lip breathing  Abdomen: soft, nontender, no organomegaly or masses  Back: no CVAT  Neuro: Alert, oriented, speech and mentation normal  Psych: affect and mood normal    Outside and Past Medical records:  Review of the result(s) of each unique test - PSA     ASSESSMENT and PLAN  68 year old male with stage pT2N0, [place] 4+3=7 prostate cancer, s/p RALP completed 4/29/2020. Negative margins. His PSA jerod to 0.14 and he completed salvage radiotherapy  9/2021. Tolerated this well. No changes in urinary control. PSA detectable at this point, and has risen to 0.19, but a subtle change from last value of 0.18, suggesting a slow rise. We reviewed this is consistent with biochemical recurrence, and given his prior surgery and radiation, may represent metastatic disease. We reviewed the role for PSMA PET imaging, but that PSA remains a bit too low for this to accurately define area of recurrence. At this point, will continue observation.     - Follow-up 6 months with PSA    10 minutes spent on the date of the encounter doing chart review, history and exam, documentation and further activities as noted above.    Brady Flannery MD  Urology  [facility]

## 2024-04-11 NOTE — Clinical Note
4/11/2024       RE: Dion Bowman  [address]  [facility] [address]     Dear Colleague,    Thank you for referring your patient, Dion Bowman, to the [facility] at [facility]. Please see a copy of my visit note below.      CHIEF COMPLAINT   It was my pleasure to see Dion Bowman who is a 68 year old male for follow-up of Prostate Cancer.      HPI  Dion Bowman is a very pleasant 68 year old male who presents with a history of Prostate Cancer.  Had RALP completed 4/29/2020. [place] 4+3 disease, pT2. Doing well. Excellent return of continence. PSA jerod to 0.14 6/2021. Salvage radiation completed 9/2021 and tolerated this very well.     PSA is now rising again, most recently to 0.18.     PSA  4/11/2024 - ***  1/4/2024 - 0.18  6/29/2023 - 0.05  4/11/2023 - 0.06  12/29/2022 - <0.04  6/30/2022 - <0.04  12/9/2021 - <0.04  6/18/2021 - 0.14  3/9/2021 - 0.05  12/18/2020 - 0.04  9/4/2020 - <0.04     RALP 4/29/2020  FINAL DIAGNOSIS:   A: Prostate and bilateral seminal vesicles: Robotic assisted Radical   prostatectomy:   - Prostatic adenocarcinoma, mixed acinar and ductal types, [place] score   4+3 = 7, grade group 3, involving   approximately 20% of total prostatic volume   - Tumor is confined to the prostate   - Resection margins negative for carcinoma   - Benign bilateral seminal vesicles   - See synoptic report for details     B: Lymph nodes, bilateral pelvic: Dissection:   - Nine lymph nodes, negative for metastatic carcinoma (0/9)    PHYSICAL EXAM  Patient is a 68 year old  male   Vitals: Blood pressure (!) 142/77, pulse 82, SpO2 95%.  General Appearance Adult: There is no height or weight on file to calculate BMI.  Alert, no acute distress, oriented  Lungs: no respiratory distress, or pursed lip breathing  Abdomen: soft, nontender, no organomegaly or masses; ***  Back: *** CVAT  Neuro: Alert, oriented, speech and mentation normal  Psych:  affect and mood normal  : {CAL EXAM MALE GENITAL:211626}    Outside and Past Medical records:  Review of the result(s) of each unique test - PSA     ASSESSMENT and PLAN  68 year old male with stage pT2N0, Deb 4+3=7 prostate cancer, s/p RALP completed 4/29/2020. Negative margins. His PSA jerod to 0.14 and he completed salvage radiotherapy 9/2021. Tolerated this well. No changes in urinary control. PSA detectable at this point, and has risen to 0.18. We reviewed this is consistent with biochemical recurrence, and given his prior surgery and radiation, may represent metastatic disease. We reviewed the role for PSMA PET imaging, but that PSA remains a bit too low for this to accurately define area of recurrence. Will plan PSA in 3 months and likely PSMA PET scan if this continues to rise. We briefly discussed possible additional treatment strategies, including ADT should PSA continue to rise.     - Follow-up 3 months with PSA    *** minutes spent on the date of the encounter doing chart review, history and exam, documentation and further activities as noted above.    rBady Flannery MD  Urology  [facility]        CHIEF COMPLAINT   It was my pleasure to see Dion Bowman who is a 68 year old male for follow-up of Prostate Cancer.      HPI  Dion Bowman is a very pleasant 68 year old male who presents with a history of Prostate Cancer.  Had RALP completed 4/29/2020. Deb 4+3 disease, pT2. Doing well. Excellent return of continence. PSA jerod to 0.14 6/2021. Salvage radiation completed 9/2021 and tolerated this very well.     PSA is now rising again, most recently to 0.18.     PSA  4/11/2024 - 0.19  1/4/2024 - 0.18  6/29/2023 - 0.05  4/11/2023 - 0.06  12/29/2022 - <0.04  6/30/2022 - <0.04  12/9/2021 - <0.04  6/18/2021 - 0.14  3/9/2021 - 0.05  12/18/2020 - 0.04  9/4/2020 - <0.04     RALP 4/29/2020  FINAL DIAGNOSIS:   A: Prostate and bilateral seminal vesicles: Robotic assisted Radical    prostatectomy:   - Prostatic adenocarcinoma, mixed acinar and ductal types, Deb score   4+3 = 7, grade group 3, involving   approximately 20% of total prostatic volume   - Tumor is confined to the prostate   - Resection margins negative for carcinoma   - Benign bilateral seminal vesicles   - See synoptic report for details     B: Lymph nodes, bilateral pelvic: Dissection:   - Nine lymph nodes, negative for metastatic carcinoma (0/9)    PHYSICAL EXAM  Patient is a 68 year old  male   Vitals: Blood pressure (!) 142/77, pulse 82, SpO2 95%.  General Appearance Adult: There is no height or weight on file to calculate BMI.  Alert, no acute distress, oriented  Lungs: no respiratory distress, or pursed lip breathing  Abdomen: soft, nontender, no organomegaly or masses  Back: no CVAT  Neuro: Alert, oriented, speech and mentation normal  Psych: affect and mood normal    Outside and Past Medical records:  Review of the result(s) of each unique test - PSA     ASSESSMENT and PLAN  68 year old male with stage pT2N0, Deb 4+3=7 prostate cancer, s/p RALP completed 4/29/2020. Negative margins. His PSA jerod to 0.14 and he completed salvage radiotherapy 9/2021. Tolerated this well. No changes in urinary control. PSA detectable at this point, and has risen to 0.19, but a subtle change from last value of 0.18, suggesting a slow rise. We reviewed this is consistent with biochemical recurrence, and given his prior surgery and radiation, may represent metastatic disease. We reviewed the role for PSMA PET imaging, but that PSA remains a bit too low for this to accurately define area of recurrence. At this point, will continue observation.     - Follow-up 6 months with PSA    10 minutes spent on the date of the encounter doing chart review, history and exam, documentation and further activities as noted above.    Brady Flannery MD  Urology  [facility]        Again, thank you for allowing me to participate in  the care of your patient.      Sincerely,    Brady Flannery MD

## 2024-04-15 ENCOUNTER — OFFICE VISIT (OUTPATIENT)
Dept: FAMILY MEDICINE | Facility: CLINIC | Age: 69
End: 2024-04-15
Payer: COMMERCIAL

## 2024-04-15 VITALS
BODY MASS INDEX: 26.83 KG/M2 | HEART RATE: 63 BPM | OXYGEN SATURATION: 100 % | RESPIRATION RATE: 18 BRPM | WEIGHT: 198.1 LBS | SYSTOLIC BLOOD PRESSURE: 145 MMHG | DIASTOLIC BLOOD PRESSURE: 80 MMHG | TEMPERATURE: 97.8 F | HEIGHT: 72 IN

## 2024-04-15 DIAGNOSIS — R73.01 IMPAIRED FASTING BLOOD SUGAR: ICD-10-CM

## 2024-04-15 DIAGNOSIS — I10 BENIGN ESSENTIAL HTN: ICD-10-CM

## 2024-04-15 DIAGNOSIS — Z00.00 ENCOUNTER FOR MEDICARE ANNUAL WELLNESS EXAM: Primary | ICD-10-CM

## 2024-04-15 DIAGNOSIS — E78.5 HYPERLIPIDEMIA LDL GOAL <130: ICD-10-CM

## 2024-04-15 PROCEDURE — G0439 PPPS, SUBSEQ VISIT: HCPCS | Performed by: FAMILY MEDICINE

## 2024-04-15 PROCEDURE — 99214 OFFICE O/P EST MOD 30 MIN: CPT | Mod: 25 | Performed by: FAMILY MEDICINE

## 2024-04-15 RX ORDER — ATORVASTATIN CALCIUM 20 MG/1
20 TABLET, FILM COATED ORAL DAILY
Qty: 90 TABLET | Refills: 1 | Status: SHIPPED | OUTPATIENT
Start: 2024-10-01

## 2024-04-15 RX ORDER — AMLODIPINE BESYLATE 10 MG/1
10 TABLET ORAL DAILY
Qty: 90 TABLET | Refills: 3 | Status: SHIPPED | OUTPATIENT
Start: 2024-04-15

## 2024-04-15 RX ORDER — RESPIRATORY SYNCYTIAL VIRUS VACCINE 120MCG/0.5
0.5 KIT INTRAMUSCULAR ONCE
Qty: 1 EACH | Refills: 0 | Status: CANCELLED | OUTPATIENT
Start: 2024-04-15 | End: 2024-04-15

## 2024-04-15 RX ORDER — ATENOLOL 100 MG/1
100 TABLET ORAL DAILY
Qty: 90 TABLET | Refills: 3 | Status: SHIPPED | OUTPATIENT
Start: 2024-04-15

## 2024-04-15 ASSESSMENT — PAIN SCALES - GENERAL: PAINLEVEL: NO PAIN (0)

## 2024-04-15 NOTE — PROGRESS NOTES
Preventive Care Visit  [facility]  Chance Terra Guzmán MD, MD, Family Medicine  Apr 15, 2024      Assessment & Plan     Encounter for Medicare annual wellness exam   Can have labs in 6 months along with his psa during urology appt.     Benign essential HTN  Bp above goal. On multiple antihypertensive medications. Will hold off on changes as previously he felt his outside bp was fine. He is asymptomatic.   - amLODIPine (NORVASC) 10 MG tablet; Take 1 tablet (10 mg) by mouth daily  - atenolol (TENORMIN) 100 MG tablet; Take 1 tablet (100 mg) by mouth daily  - Comprehensive metabolic panel (BMP + Alb, Alk Phos, ALT, AST, Total. Bili, TP); Future    Hyperlipidemia LDL goal <130  Patient is tolerating current medication without any major side effects of concerns and current dose seems reasonable too.  Current medication regime is effective. Continue current treatment without any changes.   - atorvastatin (LIPITOR) 20 MG tablet; Take 1 tablet (20 mg) by mouth daily  - Lipid panel reflex to direct LDL Fasting; Future  - Comprehensive metabolic panel (BMP + Alb, Alk Phos, ALT, AST, Total. Bili, TP); Future    Impaired fasting blood sugar     - Hemoglobin A1c; Future              Counseling  Appropriate preventive services were discussed with this patient, including applicable screening as appropriate for fall prevention, nutrition, physical activity, Tobacco-use cessation, weight loss and cognition.  Checklist reviewing preventive services available has been given to the patient.  Reviewed patient's diet, addressing concerns and/or questions.           Luis Ashley is a 68 year old, presenting for the following:  Medicare Visit        4/15/2024     8:38 AM   Additional Questions   Roomed by Ron   Accompanied by Self         Health Care Directive  Patient does not have a Health Care Directive or Living Will: Patient states has Advance Directive and will bring in a copy to  clinic.    HPI    Doping well.   Stopped drinking pop mostly. Dessert cut down for few months.   Plays pickleball.   Does not check bp consistently. Feels fine.   120s to 140s at home.         4/8/2024   General Health   How would you rate your overall physical health? Good   Feel stress (tense, anxious, or unable to sleep) Not at all         4/8/2024   Nutrition   Diet: Regular (no restrictions)         4/8/2024   Exercise   Days per week of moderate/strenous exercise 7 days   Average minutes spent exercising at this level 60 min         4/8/2024   Social Factors   Frequency of gathering with friends or relatives Once a week   Worry food won't last until get money to buy more No   Food not last or not have enough money for food? No   Do you have housing?  Yes   Are you worried about losing your housing? No   Lack of transportation? No   Unable to get utilities (heat,electricity)? No         4/14/2024   Fall Risk   Fallen 2 or more times in the past year? No   Trouble with walking or balance? No          4/8/2024   Activities of Daily Living- Home Safety   Needs help with the following daily activites None of the above   Safety concerns in the home None of the above         4/8/2024   Dental   Dentist two times every year? Yes         4/8/2024   Hearing Screening   Hearing concerns? None of the above         4/8/2024   Driving Risk Screening   Patient/family members have concerns about driving No         4/8/2024   General Alertness/Fatigue Screening   Have you been more tired than usual lately? No         4/8/2024   Urinary Incontinence Screening   Bothered by leaking urine in past 6 months No         4/8/2024   TB Screening   Were you born outside of the [place]? No     Today's PHQ-2 Score:       4/14/2024     1:45 PM   PHQ-2 ( 1999 Pf[employer])   Q1: Little interest or pleasure in doing things 0   Q2: Feeling down, depressed or hopeless 0   PHQ-2 Score 0   Q1: Little interest or pleasure in doing things Not at all   Q2:  Feeling down, depressed or hopeless Not at all   PHQ-2 Score 0         4/8/2024   Substance Use   Alcohol more than 3/day or more than 7/wk No   Do you have a current opioid prescription? No   How severe/bad is pain from 1 to 10? 1/10   Do you use any other substances recreationally? No     Social History     Tobacco Use    Smoking status: Never    Smokeless tobacco: Never   Vaping Use    Vaping status: Never Used   Substance Use Topics    Alcohol use: Not Currently     Comment: 1 drink every two weeks. During Football Season    Drug use: No          4/8/2024   AAA Screening   Family history of Abdominal Aortic Aneurysm (AAA)? Unsure   Last PSA:   PSA   Date Value Ref Range Status   10/14/2019 9.32 (H) 0 - 4 ug/L Final     Comment:     Assay Method:  Chemiluminescence using Siemens Vista analyzer     PSA Tumor Marker   Date Value Ref Range Status   04/11/2024 0.19 0.00 - 4.00 ug/L Final     ASCVD Risk   The 10-year ASCVD risk score (Emily BARGER, et al., 2019) is: 16.2%    Values used to calculate the score:      Age: 68 years      Sex: Male      Is Non- : No      Diabetic: No      Tobacco smoker: No      Systolic Blood Pressure: 145 mmHg      Is BP treated: Yes      HDL Cholesterol: 62 mg/dL      Total Cholesterol: 156 mg/dL        Reviewed and updated as needed this visit by Provider    Current providers sharing in care for this patient include:  Patient Care Team:  Chance Guzmán MD as PCP - General (Family Medicine)  Bakari Mckeon MD as MD (Urology)  Brady Flannery MD as Assigned Cancer Care Provider  Bud Morales MD as MD (Surgery)  Toña Ortega MD as MD (Family Medicine)  Chance Guzmán MD as Assigned PCP    The following health maintenance items are reviewed in Epic and correct as of today:  Health Maintenance   Topic Date Due    RSV VACCINE (Pregnancy & 60+) (1 - 1-dose 60+ series) Never done    DTAP/TDAP/TD IMMUNIZATION  "(2 - Td or Tdap) 12/20/2023    ANNUAL REVIEW OF HM ORDERS  04/11/2024    MEDICARE ANNUAL WELLNESS VISIT  04/11/2024    A1C  11/16/2024    BMP  11/16/2024    LIPID  11/16/2024    FALL RISK ASSESSMENT  04/15/2025    GLUCOSE  11/16/2026    ADVANCE CARE PLANNING  04/11/2028    COLORECTAL CANCER SCREENING  03/09/2031    HEPATITIS C SCREENING  Completed    PHQ-2 (once per calendar year)  Completed    INFLUENZA VACCINE  Completed    Pneumococcal Vaccine: 65+ Years  Completed    ZOSTER IMMUNIZATION  Completed    COVID-19 Vaccine  Completed    IPV IMMUNIZATION  Aged Out    HPV IMMUNIZATION  Aged Out    MENINGITIS IMMUNIZATION  Aged Out    RSV MONOCLONAL ANTIBODY  Aged Out        Objective    Exam  BP (!) 145/80 (BP Location: Right arm, Patient Position: Sitting, Cuff Size: Adult Large)   Pulse 63   Temp 97.8  F (36.6  C) (Temporal)   Resp 18   Ht 1.82 m (5' 11.65\")   Wt 89.9 kg (198 lb 1.6 oz)   SpO2 100%   BMI 27.13 kg/m     Estimated body mass index is 27.13 kg/m  as calculated from the following:    Height as of this encounter: 1.82 m (5' 11.65\").    Weight as of this encounter: 89.9 kg (198 lb 1.6 oz).    Physical Exam  GENERAL: alert and no distress  EYES: Eyes grossly normal to inspection, PERRL and conjunctivae and sclerae normal  HENT: ear canals and TM's normal, nose and mouth without ulcers or lesions  NECK: no adenopathy, no asymmetry, masses, or scars  RESP: lungs clear to auscultation - no rales, rhonchi or wheezes  CV: regular rate and rhythm, normal S1 S2, no S3 or S4, no murmur, click or rub, no peripheral edema  ABDOMEN: soft, nontender, no hepatosplenomegaly, no masses and bowel sounds normal  MS: no gross musculoskeletal defects noted, no edema  SKIN: no suspicious lesions or rashes  NEURO: Normal strength and tone, mentation intact and speech normal  PSYCH: mentation appears normal, affect normal/bright        4/15/2024   Mini Cog   Clock Draw Score 2 Normal   3 Item Recall 3 objects recalled "   Mini Cog Total Score 5             4/4/2022   Vision Screen   Patient wears corrective lenses (select all that apply) Worn during vision screen   Vision Screen Results Pass   Vision Screen Results- Second Attempt Pass     Signed Electronically by: Chance Guzmán MD

## 2024-04-15 NOTE — PATIENT INSTRUCTIONS
=======================================  FYI:     System will autorelease results as soon as they are available. I usually wait for all results to be ready before sending you a comment or message.  Be assured I will review and comment on all of your results as soon as I can.     I am at [facility]  ([phone]) usually Monday, Tuesday and Wednesday.   Messages, evisits, phone calls received on Thursday and Friday may not get responded very urgently but I will try to respond as soon as possible.     2) My schedule sometime been booking out far in advance. I apologize for the lack of timely access. If you need to be seen for a chronic condition or preventive (wellness) visit, please be sure to schedule that appointment few months in advance.  If you have a concern that you feel cannot wait until my next available appointment (such as a hospital follow-up or new symptom of concern) please ask to speak to one of the [facility] nurses who may be able to access a sooner appointment.      You can schedule a video visit for follow-up appointments as well as future appointments for certain conditions.  Please see the below link.     Video Visits ([email or web address])     If you have not already done so,  I encourage you to sign up for [email or web address] ([email or web address]).  This will allow you to review your results, securely communicate with a provider, and schedule virtual visits as well.  Preventive Care Advice   This is general advice given by our system to help you stay healthy. However, your care team may have specific advice just for you. Please talk to your care team about your preventive care needs.  Nutrition  Eat 5 or more servings of fruits and vegetables each day.  Try wheat bread, brown rice and whole grain pasta (instead of white bread, rice, and pasta).  Get enough calcium and vitamin D. Check the label on foods and aim for 100% of the RDA (recommended daily  allowance).  Lifestyle  Exercise at least 150 minutes each week   (30 minutes a day, 5 days a week).  Do muscle strengthening activities 2 days a week. These help control your weight and prevent disease.  No smoking.  Wear sunscreen to prevent skin cancer.  Have a dental exam and cleaning every 6 months.  Yearly exams  See your health care team every year to talk about:  Any changes in your health.  Any medicines your care team has prescribed.  Preventive care, family planning, and ways to prevent chronic diseases.  Shots (vaccines)   HPV shots (up to age 26), if you've never had them before.  Hepatitis B shots (up to age 59), if you've never had them before.  COVID-19 shot: Get this shot when it's due.  Flu shot: Get a flu shot every year.  Tetanus shot: Get a tetanus shot every 10 years.  Pneumococcal, hepatitis A, and RSV shots: Ask your care team if you need these based on your risk.  Shingles shot (for age 50 and up).  General health tests  Diabetes screening:  Starting at age 35, Get screened for diabetes at least every 3 years.  If you are younger than age 35, ask your care team if you should be screened for diabetes.  Cholesterol test: At age 39, start having a cholesterol test every 5 years, or more often if advised.  Bone density scan (DEXA): At age 50, ask your care team if you should have this scan for osteoporosis (brittle bones).  Hepatitis C: Get tested at least once in your life.  STIs (sexually transmitted infections)  Before age 24: Ask your care team if you should be screened for STIs.  After age 24: Get screened for STIs if you're at risk. You are at risk for STIs (including HIV) if:  You are sexually active with more than one person.  You don't use condoms every time.  You or a partner was diagnosed with a sexually transmitted infection.  If you are at risk for HIV, ask about PrEP medicine to prevent HIV.  Get tested for HIV at least once in your life, whether you are at risk for HIV or  not.  Cancer screening tests  Cervical cancer screening: If you have a cervix, begin getting regular cervical cancer screening tests at age 21. Most people who have regular screenings with normal results can stop after age 65. Talk about this with your provider.  Breast cancer scan (mammogram): If you've ever had breasts, begin having regular mammograms starting at age 40. This is a scan to check for breast cancer.  Colon cancer screening: It is important to start screening for colon cancer at age 45.  Have a colonoscopy test every 10 years (or more often if you're at risk) Or, ask your provider about stool tests like a FIT test every year or Cologuard test every 3 years.  To learn more about your testing options, visit: https://www.[employer]/430265.pdf.  For help making a decision, visit: https://bit.AOL/ci86858.  Prostate cancer screening test: If you have a prostate and are age 55 to 69, ask your provider if you would benefit from a yearly prostate cancer screening test.  Lung cancer screening: If you are a current or former smoker age 50 to 80, ask your care team if ongoing lung cancer screenings are right for you.  For informational purposes only. Not to replace the advice of your health care provider. Copyright   2023 [employer]. All rights reserved. Clinically reviewed by the [facility] Transitions Program. Prixel [id number] - REV 01/24.

## 2024-05-13 DIAGNOSIS — I10 BENIGN ESSENTIAL HTN: ICD-10-CM

## 2024-05-13 RX ORDER — ATENOLOL 100 MG/1
100 TABLET ORAL DAILY
Qty: 90 TABLET | Refills: 3 | OUTPATIENT
Start: 2024-05-13

## 2024-05-13 RX ORDER — AMLODIPINE BESYLATE 10 MG/1
10 TABLET ORAL DAILY
Qty: 90 TABLET | Refills: 3 | OUTPATIENT
Start: 2024-05-13

## 2024-09-05 ENCOUNTER — DOCUMENTATION ONLY (OUTPATIENT)
Dept: OTHER | Facility: CLINIC | Age: 69
End: 2024-09-05
Payer: COMMERCIAL

## 2024-09-09 ENCOUNTER — DOCUMENTATION ONLY (OUTPATIENT)
Dept: OTHER | Facility: CLINIC | Age: 69
End: 2024-09-09
Payer: COMMERCIAL

## 2024-10-17 ENCOUNTER — OFFICE VISIT (OUTPATIENT)
Dept: UROLOGY | Facility: CLINIC | Age: 69
End: 2024-10-17
Payer: COMMERCIAL

## 2024-10-17 VITALS
HEIGHT: 72 IN | OXYGEN SATURATION: 99 % | HEART RATE: 65 BPM | WEIGHT: 185 LBS | SYSTOLIC BLOOD PRESSURE: 152 MMHG | DIASTOLIC BLOOD PRESSURE: 86 MMHG | BODY MASS INDEX: 25.06 KG/M2

## 2024-10-17 DIAGNOSIS — C61 PROSTATE CANCER (H): Primary | ICD-10-CM

## 2024-10-17 LAB — PSA SERPL-MCNC: 0.34 UG/L (ref 0–4)

## 2024-10-17 PROCEDURE — 84153 ASSAY OF PSA TOTAL: CPT | Performed by: UROLOGY

## 2024-10-17 PROCEDURE — 99213 OFFICE O/P EST LOW 20 MIN: CPT | Performed by: UROLOGY

## 2024-10-17 PROCEDURE — 36415 COLL VENOUS BLD VENIPUNCTURE: CPT | Performed by: UROLOGY

## 2024-10-17 ASSESSMENT — PAIN SCALES - GENERAL: PAINLEVEL: NO PAIN (0)

## 2024-10-17 NOTE — PROGRESS NOTES
CHIEF COMPLAINT   It was my pleasure to see Dion Bowman who is a 69 year old male for follow-up of Prostate Cancer.      HPI  Dion Bowman is a very pleasant 69 year old male who presents with a history of Prostate Cancer.  Had RALP completed 4/29/2020. [place] 4+3 disease, pT2. Doing well. Excellent return of continence. PSA jerod to 0.14 in 6/2021. Salvage radiation completed 9/2021 and tolerated this very well.     PSA is now rising again, most recently to 0.34.     PSA  10/17/2024 - 0.34  4/11/2024 - 0.19  1/4/2024 - 0.18  6/29/2023 - 0.05  4/11/2023 - 0.06  12/29/2022 - <0.04  6/30/2022 - <0.04  12/9/2021 - <0.04  6/18/2021 - 0.14  3/9/2021 - 0.05  12/18/2020 - 0.04  9/4/2020 - <0.04     RALP 4/29/2020  FINAL DIAGNOSIS:   A: Prostate and bilateral seminal vesicles: Robotic assisted Radical   prostatectomy:   - Prostatic adenocarcinoma, mixed acinar and ductal types, Deb score   4+3 = 7, grade group 3, involving   approximately 20% of total prostatic volume   - Tumor is confined to the prostate   - Resection margins negative for carcinoma   - Benign bilateral seminal vesicles   - See synoptic report for details     B: Lymph nodes, bilateral pelvic: Dissection:   - Nine lymph nodes, negative for metastatic carcinoma (0/9)    PHYSICAL EXAM  Patient is a 69 year old  male   Vitals: Blood pressure (!) 152/86, pulse 65, height 1.829 m (6'), weight 83.9 kg (185 lb), SpO2 99%.  General Appearance Adult: Body mass index is 25.09 kg/m .  Alert, no acute distress, oriented  Lungs: no respiratory distress, or pursed lip breathing  Abdomen: soft, nontender, no organomegaly or masses  Back: no CVAT  Neuro: Alert, oriented, speech and mentation normal  Psych: affect and mood normal    Outside and Past Medical records:  Review of the result(s) of each unique test - PSA     ASSESSMENT and PLAN  68 year old male with stage pT2N0, [place] 4+3=7 prostate cancer, s/p RALP completed 4/29/2020. Negative margins. His PSA jerod to  0.14 and he completed salvage radiotherapy 9/2021. Tolerated this well. No changes in urinary control. PSA detectable at this point, and has risen to 0.34. We reviewed this is consistent with biochemical recurrence, and given his prior surgery and radiation, may represent metastatic disease. We reviewed the role for PSMA PET imaging.    - PSMA PET - will call with results    15 minutes spent on the date of the encounter doing chart review, history and exam, documentation and further activities as noted above.    Brady Flannery MD  Urology  [facility]

## 2024-10-17 NOTE — Clinical Note
10/17/2024       RE: Dion Bowman  [address]  [facility] [address]     Dear Colleague,    Thank you for referring your patient, Dion Bowman, to the [facility] at [facility]. Please see a copy of my visit note below.      CHIEF COMPLAINT   It was my pleasure to see Dion Bowman who is a 69 year old male for follow-up of Prostate Cancer.      HPI  Dion Bowman is a very pleasant 69 year old male who presents with a history of Prostate Cancer.  Had RALP completed 4/29/2020. Deb 4+3 disease, pT2. Doing well. Excellent return of continence. PSA jerod to 0.14 in 6/2021. Salvage radiation completed 9/2021 and tolerated this very well.     PSA is now rising again, most recently to 0.34.     PSA  10/17/2024 - 0.34  4/11/2024 - 0.19  1/4/2024 - 0.18  6/29/2023 - 0.05  4/11/2023 - 0.06  12/29/2022 - <0.04  6/30/2022 - <0.04  12/9/2021 - <0.04  6/18/2021 - 0.14  3/9/2021 - 0.05  12/18/2020 - 0.04  9/4/2020 - <0.04     RALP 4/29/2020  FINAL DIAGNOSIS:   A: Prostate and bilateral seminal vesicles: Robotic assisted Radical   prostatectomy:   - Prostatic adenocarcinoma, mixed acinar and ductal types, Deb score   4+3 = 7, grade group 3, involving   approximately 20% of total prostatic volume   - Tumor is confined to the prostate   - Resection margins negative for carcinoma   - Benign bilateral seminal vesicles   - See synoptic report for details     B: Lymph nodes, bilateral pelvic: Dissection:   - Nine lymph nodes, negative for metastatic carcinoma (0/9)    PHYSICAL EXAM  Patient is a 69 year old  male   Vitals: Blood pressure (!) 152/86, pulse 65, height 1.829 m (6'), weight 83.9 kg (185 lb), SpO2 99%.  General Appearance Adult: Body mass index is 25.09 kg/m .  Alert, no acute distress, oriented  Lungs: no respiratory distress, or pursed lip breathing  Abdomen: soft, nontender, no organomegaly or masses  Back: no CVAT  Neuro: Alert, oriented,  speech and mentation normal  Psych: affect and mood normal    Outside and Past Medical records:  Review of the result(s) of each unique test - PSA     ASSESSMENT and PLAN  68 year old male with stage pT2N0, Deb 4+3=7 prostate cancer, s/p RALP completed 4/29/2020. Negative margins. His PSA jerod to 0.14 and he completed salvage radiotherapy 9/2021. Tolerated this well. No changes in urinary control. PSA detectable at this point, and has risen to 0.19, but a subtle change from last value of 0.18, suggesting a slow rise. We reviewed this is consistent with biochemical recurrence, and given his prior surgery and radiation, may represent metastatic disease. We reviewed the role for PSMA PET imaging, but that PSA remains a bit too low for this to accurately define area of recurrence. At this point, will continue observation.         *** minutes spent on the date of the encounter doing chart review, history and exam, documentation and further activities as noted above.    Brady Flannery MD  Urology  [facility]        Again, thank you for allowing me to participate in the care of your patient.      Sincerely,    Brady Flannery MD

## 2024-10-31 ENCOUNTER — HOSPITAL ENCOUNTER (OUTPATIENT)
Dept: PET IMAGING | Facility: CLINIC | Age: 69
Setting detail: NUCLEAR MEDICINE
Discharge: HOME | End: 2024-10-31
Attending: UROLOGY | Admitting: UROLOGY
Payer: COMMERCIAL

## 2024-10-31 DIAGNOSIS — C61 PROSTATE CANCER (H): ICD-10-CM

## 2024-10-31 LAB
CREAT BLD-MCNC: 1.1 MG/DL (ref 0.7–1.3)
EGFRCR SERPLBLD CKD-EPI 2021: >60 ML/MIN/1.73M2

## 2024-10-31 PROCEDURE — 74177 CT ABD & PELVIS W/CONTRAST: CPT | Mod: 26 | Performed by: RADIOLOGY

## 2024-10-31 PROCEDURE — 250N000011 HC RX IP 250 OP 636: Performed by: UROLOGY

## 2024-10-31 PROCEDURE — A9596 HC RX 343 MED OP 636: HCPCS | Performed by: UROLOGY

## 2024-10-31 PROCEDURE — 82565 ASSAY OF CREATININE: CPT

## 2024-10-31 PROCEDURE — 78815 PET IMAGE W/CT SKULL-THIGH: CPT | Mod: PS

## 2024-10-31 PROCEDURE — 78812 PET IMAGE SKULL-THIGH: CPT | Mod: 26 | Performed by: RADIOLOGY

## 2024-10-31 PROCEDURE — 343N000001 HC RX 343 MED OP 636: Performed by: UROLOGY

## 2024-10-31 PROCEDURE — 71260 CT THORAX DX C+: CPT | Mod: 26 | Performed by: RADIOLOGY

## 2024-10-31 PROCEDURE — 74177 CT ABD & PELVIS W/CONTRAST: CPT

## 2024-10-31 RX ORDER — IOPAMIDOL 755 MG/ML
10-135 INJECTION, SOLUTION INTRAVASCULAR ONCE
Status: COMPLETED | OUTPATIENT
Start: 2024-10-31 | End: 2024-10-31

## 2024-10-31 RX ADMIN — IOPAMIDOL 104 ML: 755 INJECTION, SOLUTION INTRAVENOUS at 10:02

## 2024-10-31 RX ADMIN — KIT FOR THE PREPARATION OF GALLIUM GA 68 GOZETOTIDE INJECTION 5.8 MILLICURIE: KIT INTRAVENOUS at 09:11

## 2024-11-04 ENCOUNTER — TELEPHONE (OUTPATIENT)
Dept: UROLOGY | Facility: CLINIC | Age: 69
End: 2024-11-04
Payer: COMMERCIAL

## 2024-11-04 NOTE — TELEPHONE ENCOUNTER
Called patient and informed. He plans to make a follow-up appt. With Dr. Flannery for now in 4-6 weeks with PSA. Patient was transferred to scheduling.     Trinidad Phillips LPN

## 2024-11-04 NOTE — TELEPHONE ENCOUNTER
----- Message from Brady Flannery sent at 11/4/2024  7:39 AM CST -----    Please let Dion know his PSMA PET scan showed only some indeterminate areas of suspicion in the ribs. No suspicious nodes or areas of focal recurrence. I would like to have him follow-up with me in 4-6 weeks with another PSA and we will discuss plans from there. It would also be reasonable to refer him to medical oncology, if he'd prefer, to discuss options with them, given his rising PSA.    Thanks!  Nitish

## 2024-12-19 ENCOUNTER — OFFICE VISIT (OUTPATIENT)
Dept: UROLOGY | Facility: CLINIC | Age: 69
End: 2024-12-19
Payer: COMMERCIAL

## 2024-12-19 ENCOUNTER — PATIENT OUTREACH (OUTPATIENT)
Dept: ONCOLOGY | Facility: CLINIC | Age: 69
End: 2024-12-19

## 2024-12-19 VITALS
HEIGHT: 72 IN | SYSTOLIC BLOOD PRESSURE: 152 MMHG | WEIGHT: 190 LBS | OXYGEN SATURATION: 100 % | DIASTOLIC BLOOD PRESSURE: 82 MMHG | BODY MASS INDEX: 25.73 KG/M2 | HEART RATE: 91 BPM

## 2024-12-19 DIAGNOSIS — C61 PROSTATE CANCER (H): Primary | ICD-10-CM

## 2024-12-19 LAB — PSA SERPL-MCNC: 0.54 UG/L (ref 0–4)

## 2024-12-19 ASSESSMENT — PAIN SCALES - GENERAL: PAINLEVEL_OUTOF10: NO PAIN (0)

## 2024-12-19 NOTE — PROGRESS NOTES
New Patient Oncology Nurse Navigator Note     Referring provider:   Referred By    Provider Department Location Phone   Brady Flannery MD  Urologic [facility] UROLOGY [facility] [place] [phone]        Referred to (specialty): Medical Oncology    Date Referral Received:   12/19/24     Evaluation for :   prostate cancer; previosu prostatectomy and salvage radiotherapy - rising PSA     Clinical History (per Nurse review of records provided):    Patient with history of prostate cancer s/p RALP completed 4/29/2020. [place] 4+3 disease, pT2. His PSA jerod to 0.14 in 6/2021. Salvage radiation completed 9/2021    PSMA PET 10/31/2024  IMPRESSION:  1. No evidence of local recurrence or PSMA avid gemma metastasis.  2. Subtle focal PSMA uptake in the left sixth and eighth posterior  ribs without CT correlate is indeterminate, although there is  low-suspicion for metastasis. No definite evidence of PSMA avid  distant metastasis.  3. Cholelithiasis without acute cholecystitis.    PSA  12/19/2024 - 0.54  10/17/2024 - 0.34  4/11/2024 - 0.19  1/4/2024 - 0.18  6/29/2023 - 0.05  4/11/2023 - 0.06  12/29/2022 - <0.04    Patient referred to medical oncology at this time for acute rise in PSA and PET findings,  to discuss systemic therapy options.      Records Location (Care Everywhere, Media, etc.):   [facility]      I called Dion to discuss referral to oncology.  I introduced my role and reviewed what this consult visit will entail/what to expect.  I reviewed the location and gave contact numbers including new patient scheduling and clinic phone numbers.   Dion has no other questions at this time.    I forwarded on referral with scheduling instructions for the following   PLAN: SCHEDULE: Med onc for prostate cancer @ pt choice of location ([facility] has greatest availability! (Dr. Haywood, Noel, [place], [facility] or [facility])  NEW, in person or video per template allowance, first available    I warm transferred call to  our new patient scheduling to finalize appointment.    Estelle Resendiz, RN, BSN  Oncology New Patient Nurse Navigator   [facility]  [phone]

## 2024-12-19 NOTE — Clinical Note
12/19/2024       RE: Dion Bowman  [address]  [facility] [address]     Dear Colleague,    Thank you for referring your patient, Dion Bowman, to the [facility] at [facility]. Please see a copy of my visit note below.      CHIEF COMPLAINT   It was my pleasure to see Dion Bowman who is a 69 year old male for follow-up of Prostate Cancer.      HPI  Dion Bowman is a very pleasant 69 year old male who presents with a history of Prostate Cancer.  Had RALP completed 4/29/2020. Deb 4+3 disease, pT2. Doing well. Excellent return of continence. PSA jerod to 0.14 in 6/2021. Salvage radiation completed 9/2021 and tolerated this very well.     PSA is now rising again, most recently to 0.54.    PSMA PET 10/31/2024  IMPRESSION:  1. No evidence of local recurrence or PSMA avid gemma metastasis.  2. Subtle focal PSMA uptake in the left sixth and eighth posterior  ribs without CT correlate is indeterminate, although there is  low-suspicion for metastasis. No definite evidence of PSMA avid  distant metastasis.  3. Cholelithiasis without acute cholecystitis.     PSA  12/19/2024 - 0.54  10/17/2024 - 0.34  4/11/2024 - 0.19  1/4/2024 - 0.18  6/29/2023 - 0.05  4/11/2023 - 0.06  12/29/2022 - <0.04  6/30/2022 - <0.04  12/9/2021 - <0.04  6/18/2021 - 0.14  3/9/2021 - 0.05  12/18/2020 - 0.04  9/4/2020 - <0.04     RALP 4/29/2020  FINAL DIAGNOSIS:   A: Prostate and bilateral seminal vesicles: Robotic assisted Radical   prostatectomy:   - Prostatic adenocarcinoma, mixed acinar and ductal types, Wiseman score   4+3 = 7, grade group 3, involving   approximately 20% of total prostatic volume   - Tumor is confined to the prostate   - Resection margins negative for carcinoma   - Benign bilateral seminal vesicles   - See synoptic report for details     B: Lymph nodes, bilateral pelvic: Dissection:   - Nine lymph nodes, negative for metastatic carcinoma  (0/9)    PHYSICAL EXAM  Patient is a 69 year old  male   Vitals: Blood pressure (!) 152/82, pulse 91, height 1.829 m (6'), weight 86.2 kg (190 lb), SpO2 100%.  General Appearance Adult: Body mass index is 25.77 kg/m .  Alert, no acute distress, oriented  Lungs: no respiratory distress, or pursed lip breathing  Abdomen: soft, nontender, no organomegaly or masses  Back: no CVAT  Neuro: Alert, oriented, speech and mentation normal  Psych: affect and mood normal    Outside and Past Medical records:  Review of the result(s) of each unique test - PSA, PSMA PET    ASSESSMENT and PLAN  69 year old male with stage pT2N0, Deb 4+3=7 prostate cancer, s/p RALP completed 4/29/2020. Negative margins. His PSA jerod to 0.14 and he completed salvage radiotherapy 9/2021. Tolerated this well. No changes in urinary control. PSA detectable at this point, and has risen to 0.34. We reviewed this is consistent with biochemical recurrence, and given his prior surgery and radiation, may represent metastatic disease. We reviewed the role for PSMA PET imaging.     - Medical oncology referral      *** minutes spent on the date of the encounter doing chart review, history and exam, documentation and further activities as noted above.    Brady Flannery MD  Urology  [facility]        Again, thank you for allowing me to participate in the care of your patient.      Sincerely,    Brady Flannery MD

## 2024-12-19 NOTE — PROGRESS NOTES
CHIEF COMPLAINT   It was my pleasure to see Dion Bowman who is a 69 year old male for follow-up of Prostate Cancer.      HPI  Dion Bowman is a very pleasant 69 year old male who presents with a history of Prostate Cancer.  Had RALP completed 4/29/2020. [place] 4+3 disease, pT2. Doing well. Excellent return of continence. PSA jerod to 0.14 in 6/2021. Salvage radiation completed 9/2021 and tolerated this very well.     PSA is now rising again, most recently to 0.54.    PSMA PET 10/31/2024  IMPRESSION:  1. No evidence of local recurrence or PSMA avid gemma metastasis.  2. Subtle focal PSMA uptake in the left sixth and eighth posterior  ribs without CT correlate is indeterminate, although there is  low-suspicion for metastasis. No definite evidence of PSMA avid  distant metastasis.  3. Cholelithiasis without acute cholecystitis.     PSA  12/19/2024 - 0.54  10/17/2024 - 0.34  4/11/2024 - 0.19  1/4/2024 - 0.18  6/29/2023 - 0.05  4/11/2023 - 0.06  12/29/2022 - <0.04  6/30/2022 - <0.04  12/9/2021 - <0.04  6/18/2021 - 0.14  3/9/2021 - 0.05  12/18/2020 - 0.04  9/4/2020 - <0.04     RALP 4/29/2020  FINAL DIAGNOSIS:   A: Prostate and bilateral seminal vesicles: Robotic assisted Radical   prostatectomy:   - Prostatic adenocarcinoma, mixed acinar and ductal types, Deb score   4+3 = 7, grade group 3, involving   approximately 20% of total prostatic volume   - Tumor is confined to the prostate   - Resection margins negative for carcinoma   - Benign bilateral seminal vesicles   - See synoptic report for details     B: Lymph nodes, bilateral pelvic: Dissection:   - Nine lymph nodes, negative for metastatic carcinoma (0/9)    PHYSICAL EXAM  Patient is a 69 year old  male   Vitals: Blood pressure (!) 152/82, pulse 91, height 1.829 m (6'), weight 86.2 kg (190 lb), SpO2 100%.  General Appearance Adult: Body mass index is 25.77 kg/m .  Alert, no acute distress, oriented  Lungs: no respiratory distress, or pursed lip  breathing  Abdomen: soft, nontender, no organomegaly or masses  Back: no CVAT  Neuro: Alert, oriented, speech and mentation normal  Psych: affect and mood normal    Outside and Past Medical records:  Review of the result(s) of each unique test - PSA, PSMA PET    ASSESSMENT and PLAN  69 year old male with stage pT2N0, Deb 4+3=7 prostate cancer, s/p RALP completed 4/29/2020. Negative margins. His PSA jerod to 0.14 and he completed salvage radiotherapy 9/2021. Tolerated this well. No changes in urinary control. PSA detectable at this point, and has risen to 0.54. We reviewed this is consistent with biochemical recurrence, and given his prior surgery and radiation, may represent metastatic disease. PSMA PET with some indeterminate rib lesions. At this point, given relatively acute rise in PSA and PET findings, will plan medical oncology evaluation to discuss systemic therapy options. We briefly discussed ADT and some possible side effects today, as well as the rationale of this approach for prostate cancer management. He asked several thoughtful questions that were answered in detail today.      - Medical oncology referral    20 minutes spent on the date of the encounter doing chart review, history and exam, documentation and further activities as noted above.    Brady Flannery MD  Urology  [facility]

## 2024-12-20 PROBLEM — R97.21 RISING PSA FOLLOWING TREATMENT FOR MALIGNANT NEOPLASM OF PROSTATE: Status: ACTIVE | Noted: 2024-12-20

## 2024-12-20 NOTE — TELEPHONE ENCOUNTER
RECORDS STATUS - ALL OTHER DIAGNOSIS      RECORDS RECEIVED FROM: [facility]   NOTES STATUS DETAILS   OFFICE NOTE from referring provider Epic 12/19/24: Dr. Brady Flannery   OFFICE NOTE from other specialist External: [facility] 9/7/21: Dr. Eliel Mercado   OPERATIVE REPORT [facility] 4/29/20: Prostate Bx   MEDICATION LIST [facility]    LABS     PATHOLOGY REPORTS Report in Epic 4/29/20: [id number]    ANYTHING RELATED TO DIAGNOSIS Epic Most recent 12/19/24   IMAGING (NEED IMAGES & REPORT)     CT SCANS PACS 10/31/24: CT CAP  2/26/20: CT AP   MRI PACS 12/23/19: MR Prostate   NM PACS 2/26/20: NM Bone Scan   PET PACS 10/31/24: PET PSMA

## 2024-12-22 ENCOUNTER — HEALTH MAINTENANCE LETTER (OUTPATIENT)
Age: 69
End: 2024-12-22

## 2024-12-27 ENCOUNTER — PRE VISIT (OUTPATIENT)
Dept: ONCOLOGY | Facility: CLINIC | Age: 69
End: 2024-12-27
Payer: COMMERCIAL

## 2024-12-30 ENCOUNTER — MYC MEDICAL ADVICE (OUTPATIENT)
Dept: ONCOLOGY | Facility: CLINIC | Age: 69
End: 2024-12-30
Payer: COMMERCIAL

## 2024-12-30 ENCOUNTER — PATIENT OUTREACH (OUTPATIENT)
Dept: ONCOLOGY | Facility: CLINIC | Age: 69
End: 2024-12-30
Payer: COMMERCIAL

## 2024-12-30 NOTE — PROGRESS NOTES
[facility]: Cancer Care                                                                                          Mailed patient RNCC info, clinic contact info, Leuprolide & Enzalutamide patient education.    Lana GARNER RN  Cancer Care Coordinator  [facility]

## 2025-01-08 ENCOUNTER — MYC REFILL (OUTPATIENT)
Dept: FAMILY MEDICINE | Facility: CLINIC | Age: 70
End: 2025-01-08
Payer: COMMERCIAL

## 2025-01-08 DIAGNOSIS — E78.5 HYPERLIPIDEMIA LDL GOAL <130: ICD-10-CM

## 2025-01-08 RX ORDER — ATORVASTATIN CALCIUM 20 MG/1
20 TABLET, FILM COATED ORAL DAILY
Qty: 90 TABLET | Refills: 1 | OUTPATIENT
Start: 2025-01-08

## 2025-01-08 NOTE — TELEPHONE ENCOUNTER
This refill request is a duplicate request, previously received or sent.   Sent denial notification to pharmacy.  NIKITA Johnson  [facility]

## 2025-01-09 ENCOUNTER — TELEPHONE (OUTPATIENT)
Dept: FAMILY MEDICINE | Facility: CLINIC | Age: 70
End: 2025-01-09
Payer: COMMERCIAL

## 2025-01-09 DIAGNOSIS — E78.5 HYPERLIPIDEMIA LDL GOAL <130: ICD-10-CM

## 2025-01-09 RX ORDER — ATORVASTATIN CALCIUM 20 MG/1
20 TABLET, FILM COATED ORAL DAILY
Qty: 90 TABLET | Refills: 0 | Status: SHIPPED | OUTPATIENT
Start: 2025-01-09

## 2025-01-09 NOTE — TELEPHONE ENCOUNTER
Medication Question or Refill        What medication are you calling about (include dose and sig)?: Lipitor    Preferred Pharmacy:   [facility] [address] [place] [address]  [address] [facility] [address]  Phone: [phone] Fax: [phone]       Who prescribed the medication?: DR. Guzmán    Do you need a refill? Yes    When did you use the medication last? Patient has 9 tabs remaning.    Patient offered an appointment? No    Do you have any questions or concerns?  No.  Per chart reviewed, a script was sent back on 10/1/24.  But record at pharmacy is different.  Patient has no refills remaining.        Could we send this information to you in [employer] or would you prefer to receive a phone call?:   Patient would prefer a phone call   Okay to leave a detailed message?: Yes at Cell number on file:    Telephone Information:   Mobile [phone]

## 2025-02-03 DIAGNOSIS — I10 BENIGN ESSENTIAL HTN: ICD-10-CM

## 2025-02-04 RX ORDER — AMLODIPINE BESYLATE 10 MG/1
10 TABLET ORAL DAILY
Qty: 90 TABLET | Refills: 3 | OUTPATIENT
Start: 2025-02-04

## 2025-02-04 RX ORDER — ATENOLOL 100 MG/1
100 TABLET ORAL DAILY
Qty: 90 TABLET | Refills: 3 | OUTPATIENT
Start: 2025-02-04

## 2025-02-26 ENCOUNTER — PATIENT OUTREACH (OUTPATIENT)
Dept: ONCOLOGY | Facility: CLINIC | Age: 70
End: 2025-02-26
Payer: COMMERCIAL

## 2025-02-26 NOTE — PROGRESS NOTES
[facility]: Cancer Care                                                                                          Patient called wondering if he happens to have an elevated PSA during his next lab draw and needs to start treatment, would that interfere with a planned two week trip to [place] in June.  Informed patient if he happens to need treatment then, we will be able to accommodate his schedule & travel plans.  Patient verbalized understanding.    Lana GARNER RN  Cancer Care Coordinator  [facility]

## 2025-03-01 DIAGNOSIS — I10 BENIGN ESSENTIAL HTN: ICD-10-CM

## 2025-03-03 RX ORDER — LISINOPRIL AND HYDROCHLOROTHIAZIDE 12.5; 2 MG/1; MG/1
TABLET ORAL
Qty: 180 TABLET | Refills: 0 | Status: SHIPPED | OUTPATIENT
Start: 2025-03-03

## 2025-03-04 NOTE — PROGRESS NOTES
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  Patient presents with an elevated PSA of 5.59 ng/mL (05/12/2017), PSA 9.32 ng/mL (10/14/2019). MRI prostate 12/23/2019, revealed prostate measuring 2.4 x 4.6 x 4.1 cm with estimated volume of 23 g.  There was an 11.3 x 9.2 mm, lobulated T2 hypointense focus in the 5:00 position of the left base and mid gland peripheral zone with markedly hyperintense diffusion restriction and markedly hypointense ADC, with 6-15 mm capsular abutment with focal bulging and capsular irregularity (PI-RADS 5) designated as lesion 1.  There was an 8.4 x 8.8 mm moderately T2 hypointense lesion in the 7:00 position of the right base and mid gland peripheral zone, with moderately hyperintense DWI and moderate hypointense ADC, with 6-15 mm capsular abutment with smooth contour (PI-RADS 4) designated as lesion 2.  Lesion 1 approaches the neurovascular bundle.  There was no seminal vesicle involvement.  There was no lymphadenopathy.  There was no suspicious osseous lesions. CT abdomen, pelvis 02/26/2020, was negative for adenopathy or metastases.  Bone scan 02/26/2020, was negative for metastases.  There was mild increased uptake in the shoulders, knees, and feet consistent with degenerative changes. UroNav MRI-ultrasound fusion-guided prostate core needle biopsy 06/17/2020, revealed a [place] 4+4=8 ductal adenocarcinoma involving 90% of the core needle biopsy samples from the left mid lateral, 15% of the biopsy sample from the left lateral apex, and mixed ductal and acinar adenocarcinoma involving 80% of the biopsy sample from the left mid gland.  There was a [place] 4+3=7 mixed ductal and acinar adenocarcinoma involving 40% of the core needle biopsy sample from the left  base, and 70% of 2 of 3 biopsy samples from lesion 1 in the left base.  There was a [place] 3+4=7 acinar adenocarcinoma involving 30% of the core needle biopsy sample from the right mid lateral, and an adenocarcinoma involving 20% of the biopsy sample from the right lateral apex.  There was a [place] 3+3=6 acinar adenocarcinoma involving 30% of the core needle biopsy sample from the left apex, 20% of the biopsy sample from the right lateral base, 80% of the biopsy sample from the right base, 40% of 1 of 2 biopsy samples from lesion 2 in the right base, and an adenocarcinoma involving 10% of the biopsy sample from the right mid gland.  There was benign prostatic tissue in the core needle biopsy samples from the left lateral base, and right apex (14/17 samples involved).    Patient opted to proceed with robotic-assisted laparoscopic prostatectomy and bilateral pelvic lymph node dissection 04/29/2020, that revealed a [place] 4+3=7 adenocarcinoma, mixed acinar (80%) and ductal (20%) types, involving 20% of the total prostatic volume.  The tumor was confined to the prostate without extraprostatic extension.  There was no lymphovascular or perineural invasion.  There was no urinary bladder neck invasion and seminal vesicles were not involved.  Surgical margins were tumor free. Nine pelvic lymph nodes were negative for metastases (0/9 lymph nodes involved).  PSA was undetectable at <0.04 ng/mL (09/04/2020) with subsequent biochemical progression with PSA 0.04 ng/mL (12/18/2020), PSA 0.05 ng/mL (03/19/2021), PSA 0.14 ng/mL (06/18/2021). Patient received salvage radiation therapy to the prostate bed (6840 cGy in 38 fractions) and pelvic lymph nodes (4500 cGy in 25 fractions) from 07/14/2021 through 09/07/2021, without androgen deprivation therapy (ADT).     There was a PSA yobany of <0.04 ng/mL (12/09/2021 through 12/29/2022), with subsequent biochemical progression with PSA 0.06 ng/mL (04/11/2023), PSA 0.18 ng/mL  (01/04/2024), PSA 0.34 ng/mL (10/17/2024), PSA 0.54 ng/mL (12/19/2024). 68-Ga PSMA-11 PET/CT scan 10/31/2024, revealed postsurgical changes related to prior prostatectomy.  There was no PSMA uptake in the prostatectomy bed.  There were no PSMA-avid metastases.  There was subtle focal PSMA uptake in the left posterior sixth rib without CT correlate with SUV max 1.6 and SUV mean 0.9, and an additional subtle focus of PSMA uptake in the left eighth rib without CT correlate.  Patient has mild positional urinary incontinence particularly when squatting, and nocturia 1-2 times per night.  Patient exercises 6 days a week and plays [employer] ball regularly. There are no other new symptoms or events since the prior clinic visit (12/27/2024).  Patient feels well and denies fever, anorexia, weight loss, headache, cough, dyspnea, chest pain, abdominal pain, nausea, vomiting, constipation, diarrhea, bleeding, or bone pain.     PAST HISTORY: Past history was reviewed and unchanged from the clinic note 12/27/2024.     MEDICATIONS:   Current Outpatient Medications   Medication Sig Dispense Refill    amLODIPine (NORVASC) 10 MG tablet Take 1 tablet (10 mg) by mouth daily 90 tablet 3    atenolol (TENORMIN) 100 MG tablet Take 1 tablet (100 mg) by mouth daily 90 tablet 3    atorvastatin (LIPITOR) 20 MG tablet Take 1 tablet (20 mg) by mouth daily. 90 tablet 0    FISH OIL 1200 MG OR CAPS Take 2 capsules by mouth every other day       lisinopril-hydrochlorothiazide (ZESTORETIC) 20-12.5 MG tablet TAKE 2 TABLETS BY MOUTH EVERY MORNING 180 tablet 0    sildenafil (REVATIO) 20 MG tablet Take 1 tablet (20 mg) by mouth daily as needed (sexual activity) 30 tablet 3    VITAMIN D, CHOLECALCIFEROL, PO Take 1,000 Units by mouth daily         REVIEW OF SYSTEMS: Review of systems reviewed with the patient and otherwise negative except for those detailed above.    PHYSICAL EXAM: BP (!) 149/79   Pulse 68   Temp 98  F (36.7  C)   Resp 18   Ht 1.829 m (6'  "0.01\")   Wt 85.5 kg (188 lb 9.6 oz)   SpO2 99%   BMI 25.57 kg/m   ECOG performance status: 0. Physical exam was unchanged from prior clinic visit 12/27/2024.  Skin: No erythema or rash.  HEENT: Sclera nonicteric. Oropharynx without lesions or ulceration, mucosa pink and moist.  Nodes: No cervical, supraclavicular, axillary, or inguinal adenopathy.  Lungs: No dullness to percussion.  No rales, wheezes, rhonchi.  Heart: Regular rate and rhythm.  Abdomen: Bowel sounds present.  Soft, nontender, no hepatosplenomegaly or mass.  Extremities: No edema.     LABS REVIEWED:   Component      Latest Ref Rng 4/11/2024  2:04 PM 10/17/2024  8:40 AM 12/19/2024  8:27 AM 3/20/2025  9:30 AM   WBC      4.0 - 11.0 10e3/uL    5.4    RBC Count      4.40 - 5.90 10e6/uL    4.64    Hemoglobin      13.3 - 17.7 g/dL    15.1    Hematocrit      40.0 - 53.0 %    42.6    MCV      78 - 100 fL    92    MCH      26.5 - 33.0 pg    32.5    MCHC      31.5 - 36.5 g/dL    35.4    RDW      10.0 - 15.0 %    12.5    Platelet Count      150 - 450 10e3/uL    282    % Neutrophils      %    70    % Lymphocytes      %    19    % Monocytes      %    9    % Eosinophils      %    2    % Basophils      %    1    % Immature Granulocytes      %    0    NRBCs per 100 WBC      <1 /100    0    Absolute Neutrophils      1.6 - 8.3 10e3/uL    3.8    Absolute Lymphocytes      0.8 - 5.3 10e3/uL    1.0    Absolute Monocytes      0.0 - 1.3 10e3/uL    0.5    Absolute Eosinophils      0.0 - 0.7 10e3/uL    0.1    Absolute Basophils      0.0 - 0.2 10e3/uL    0.0    Absolute Immature Granulocytes      <=0.4 10e3/uL    0.0    Absolute NRBCs      10e3/uL    0.0    Sodium      135 - 145 mmol/L    137    Potassium      3.4 - 5.3 mmol/L    4.4    Carbon Dioxide (CO2)      22 - 29 mmol/L    26    Anion Gap      7 - 15 mmol/L    11    Urea Nitrogen      8.0 - 23.0 mg/dL    17.8    Creatinine      0.67 - 1.17 mg/dL    1.06    GFR Estimate      >60 mL/min/1.73m2    76    Calcium      8.8 - " 10.4 mg/dL    9.7    Chloride      98 - 107 mmol/L    100    Glucose      70 - 99 mg/dL    108 (H)    Alkaline Phosphatase      40 - 150 U/L    81    AST      0 - 45 U/L    29    ALT      0 - 70 U/L    20    Protein Total      6.4 - 8.3 g/dL    7.3    Albumin      3.5 - 5.2 g/dL    4.4    Bilirubin Total      <=1.2 mg/dL    0.6    PSA      0.00 - 4.00 ug/L 0.19  0.34  0.54     PSA Tumor Marker      0.00 - 4.50 ng/mL    0.59      Testosterone pending.    IMPRESSION/PLAN: Stage IIC mixed acinar and ductal adenocarcinoma of the prostate.  Prostate cancer is a [place] 4+3=7 mixed acinar and ductal adenocarcinoma confined to the prostate without extraprostatic extension, seminal vesicle involvement, or regional or distant metastases.  Patient underwent definitive surgery (04/29/2020).  PSA was undetectable postoperatively (09/04/2020) with subsequent biochemical progression with PSA 0.04 ng/mL (12/18/2020) to PSA 0.14 ng/mL (06/18/2021). Patient received salvage radiation therapy to the prostate bed and pelvic lymph nodes without concurrent ADT (from 07/14/2021 through 09/07/2021).  PSA was undetectable x1 year (from 12/09/2021 through 12/29/2022), with subsequent biochemical progression with PSA 0.06 ng/mL (04/11/2023) to PSA 0.54 ng/mL (12/19/2024). PSMA PET/CT scan (10/31/2024) was negative for evidence of recurrence or metastases.  The subtle foci of PSMA uptake in the left posterior sixth rib and left eighth rib were without CT correlate and not suspicious for metastases.  PSA doubling time is calculated at 6-7 months indicating a high risk biochemical recurrence.  Patient may benefit from androgen deprivation therapy (ADT) consisting of leuprolide 22.5 mg every 3 months plus enzalutamide 160 mg daily in accordance with the EMBARK study that revealed improvement in metastasis-free survival with leuprolide plus enzalutamide with high risk biochemical recurrence (N Engl J Med 2023;389:9307-1972).  Further,  enzalutamide monotherapy was superior to leuprolide alone.  Treatment may be discontinued at 9 months if PSA is undetectable.  Alternatively, patient may be eligible for the  phase 1b clinical trial evaluating the safety, tolerability, and efficacy of Xaluritamig in subjects with high-risk biochemical recurrence of nonmetastatic castration sensitive prostate cancer after definitive therapy.  Eligibility for this trial requires a PSA of 0.5 ng/mL or greater and a PSA doubling time of less than 12 months. Xaluritamig is a novel bispecific 2+1 T-cell engager with two STEAP1 binding sites and an anti-CD3 single-chain variable fragment domain that binds T cells to facilitate T-cell-mediated lysis of STEAP1-expressing cells. STEAP1 is overexpressed in most prostate cancers, with little to no expression on normal tissues, and has been associated with poor survival.  A published dose-exploration study of xaluritamig in metastatic castration-resistant prostate cancer demonstrated PSA and objective responses with predominantly low-grade cytokine release syndrome that occurred primarily with the first dose and that resolved with standard management (Cancer Discov 2024; 14(1): 76-89).  Patient is willing to participate in the  clinical trial, but will be traveling to [facility] in early June and he will be ready for screening after his return.  Patient will return to clinic in 3 months with CBC, metabolic panel, PSA. The current and past history, clinical evaluation, reviewing diagnostic tests and viewing images with the patient, and assessment and planning occurred over 30 minutes.       Jesus Haywood MD    cc: MD Brady Mojica MD

## 2025-03-20 ENCOUNTER — ONCOLOGY VISIT (OUTPATIENT)
Dept: ONCOLOGY | Facility: CLINIC | Age: 70
End: 2025-03-20
Attending: INTERNAL MEDICINE
Payer: COMMERCIAL

## 2025-03-20 ENCOUNTER — LAB (OUTPATIENT)
Dept: LAB | Facility: CLINIC | Age: 70
End: 2025-03-20
Attending: INTERNAL MEDICINE
Payer: COMMERCIAL

## 2025-03-20 VITALS
TEMPERATURE: 98 F | WEIGHT: 188.6 LBS | DIASTOLIC BLOOD PRESSURE: 79 MMHG | HEIGHT: 72 IN | HEART RATE: 68 BPM | RESPIRATION RATE: 18 BRPM | SYSTOLIC BLOOD PRESSURE: 149 MMHG | BODY MASS INDEX: 25.55 KG/M2 | OXYGEN SATURATION: 99 %

## 2025-03-20 DIAGNOSIS — C61 PROSTATE CANCER (H): ICD-10-CM

## 2025-03-20 DIAGNOSIS — C61 PROSTATE CANCER (H): Primary | ICD-10-CM

## 2025-03-20 DIAGNOSIS — R97.21 RISING PSA FOLLOWING TREATMENT FOR MALIGNANT NEOPLASM OF PROSTATE: ICD-10-CM

## 2025-03-20 LAB
ALBUMIN SERPL BCG-MCNC: 4.4 G/DL (ref 3.5–5.2)
ALP SERPL-CCNC: 81 U/L (ref 40–150)
ALT SERPL W P-5'-P-CCNC: 20 U/L (ref 0–70)
ANION GAP SERPL CALCULATED.3IONS-SCNC: 11 MMOL/L (ref 7–15)
AST SERPL W P-5'-P-CCNC: 29 U/L (ref 0–45)
BASOPHILS # BLD AUTO: 0 10E3/UL (ref 0–0.2)
BASOPHILS NFR BLD AUTO: 1 %
BILIRUB SERPL-MCNC: 0.6 MG/DL
BUN SERPL-MCNC: 17.8 MG/DL (ref 8–23)
CALCIUM SERPL-MCNC: 9.7 MG/DL (ref 8.8–10.4)
CHLORIDE SERPL-SCNC: 100 MMOL/L (ref 98–107)
CREAT SERPL-MCNC: 1.06 MG/DL (ref 0.67–1.17)
EGFRCR SERPLBLD CKD-EPI 2021: 76 ML/MIN/1.73M2
EOSINOPHIL # BLD AUTO: 0.1 10E3/UL (ref 0–0.7)
EOSINOPHIL NFR BLD AUTO: 2 %
ERYTHROCYTE [DISTWIDTH] IN BLOOD BY AUTOMATED COUNT: 12.5 % (ref 10–15)
GLUCOSE SERPL-MCNC: 108 MG/DL (ref 70–99)
HCO3 SERPL-SCNC: 26 MMOL/L (ref 22–29)
HCT VFR BLD AUTO: 42.6 % (ref 40–53)
HGB BLD-MCNC: 15.1 G/DL (ref 13.3–17.7)
IMM GRANULOCYTES # BLD: 0 10E3/UL
IMM GRANULOCYTES NFR BLD: 0 %
LYMPHOCYTES # BLD AUTO: 1 10E3/UL (ref 0.8–5.3)
LYMPHOCYTES NFR BLD AUTO: 19 %
MCH RBC QN AUTO: 32.5 PG (ref 26.5–33)
MCHC RBC AUTO-ENTMCNC: 35.4 G/DL (ref 31.5–36.5)
MCV RBC AUTO: 92 FL (ref 78–100)
MONOCYTES # BLD AUTO: 0.5 10E3/UL (ref 0–1.3)
MONOCYTES NFR BLD AUTO: 9 %
NEUTROPHILS # BLD AUTO: 3.8 10E3/UL (ref 1.6–8.3)
NEUTROPHILS NFR BLD AUTO: 70 %
NRBC # BLD AUTO: 0 10E3/UL
NRBC BLD AUTO-RTO: 0 /100
PLATELET # BLD AUTO: 282 10E3/UL (ref 150–450)
POTASSIUM SERPL-SCNC: 4.4 MMOL/L (ref 3.4–5.3)
PROT SERPL-MCNC: 7.3 G/DL (ref 6.4–8.3)
PSA SERPL DL<=0.01 NG/ML-MCNC: 0.59 NG/ML (ref 0–4.5)
RBC # BLD AUTO: 4.64 10E6/UL (ref 4.4–5.9)
SODIUM SERPL-SCNC: 137 MMOL/L (ref 135–145)
WBC # BLD AUTO: 5.4 10E3/UL (ref 4–11)

## 2025-03-20 PROCEDURE — 85025 COMPLETE CBC W/AUTO DIFF WBC: CPT

## 2025-03-20 PROCEDURE — 84153 ASSAY OF PSA TOTAL: CPT

## 2025-03-20 PROCEDURE — 84403 ASSAY OF TOTAL TESTOSTERONE: CPT

## 2025-03-20 PROCEDURE — G0463 HOSPITAL OUTPT CLINIC VISIT: HCPCS | Performed by: INTERNAL MEDICINE

## 2025-03-20 PROCEDURE — 80053 COMPREHEN METABOLIC PANEL: CPT

## 2025-03-20 PROCEDURE — 36415 COLL VENOUS BLD VENIPUNCTURE: CPT

## 2025-03-20 ASSESSMENT — PAIN SCALES - GENERAL: PAINLEVEL_OUTOF10: NO PAIN (0)

## 2025-03-20 NOTE — NURSING NOTE
Oncology Rooming Note    March 20, 2025 9:50 AM   Dion Bowman is a 69 year old male who presents for:    Chief Complaint   Patient presents with    Blood Draw     Labs obtained via venipuncture 23 gauge butterfly needle, VS taken, checked into next appt    Oncology Clinic Visit     Prostate CA     Initial Vitals: BP (!) 149/79   Pulse 68   Resp 18   Wt 85.5 kg (188 lb 9.6 oz)   SpO2 99%   BMI 25.58 kg/m   Estimated body mass index is 25.58 kg/m  as calculated from the following:    Height as of 12/27/24: 1.829 m (6').    Weight as of this encounter: 85.5 kg (188 lb 9.6 oz). Body surface area is 2.08 meters squared.  No Pain (0) Comment: Data Unavailable   No LMP for male patient.  Allergies reviewed: Yes  Medications reviewed: Yes    Medications: Medication refills not needed today.  Pharmacy name entered into [employer]:    [id number] - [place], [place] - [address]  [facility] - A MAIL ORDER [employer]  FOR [facility] - [facility], [place] - [address] [place] [address]  [facility] - USE FOR MAILING ONLY - [place]  [id number] - [place], [place] - [address]    Frailty Screening:   Is the patient here for a new oncology consult visit in cancer care? 2. No    PHQ9:  Did this patient require a PHQ9?: No      Clinical concerns:        Anusha Kowalski

## 2025-03-20 NOTE — LETTER
3/20/2025      Dion Bowman  [address] [facility] [id number]      Dear Colleague,    Thank you for referring your patient, Dion Bowman, to the [facility]. Please see a copy of my visit note below.      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  Patient presents with an elevated PSA of 5.59 ng/mL (05/12/2017), PSA 9.32 ng/mL (10/14/2019). MRI prostate 12/23/2019, revealed prostate measuring 2.4 x 4.6 x 4.1 cm with estimated volume of 23 g.  There was an 11.3 x 9.2 mm, lobulated T2 hypointense focus in the 5:00 position of the left base and mid gland peripheral zone with markedly hyperintense diffusion restriction and markedly hypointense ADC, with 6-15 mm capsular abutment with focal bulging and capsular irregularity (PI-RADS 5) designated as lesion 1.  There was an 8.4 x 8.8 mm moderately T2 hypointense lesion in the 7:00 position of the right base and mid gland peripheral zone, with moderately hyperintense DWI and moderate hypointense ADC, with 6-15 mm capsular abutment with smooth contour (PI-RADS 4) designated as lesion 2.  Lesion 1 approaches the neurovascular bundle.  There was no seminal vesicle involvement.  There was no lymphadenopathy.  There was no suspicious osseous lesions. CT abdomen, pelvis 02/26/2020, was negative for adenopathy or metastases.  Bone scan 02/26/2020, was negative for metastases.  There was mild increased uptake in the shoulders, knees, and feet consistent with degenerative changes. UroNav MRI-ultrasound fusion-guided prostate core needle biopsy 06/17/2020, revealed a [place] 4+4=8 ductal adenocarcinoma involving 90% of the core needle biopsy samples from the left mid lateral, 15% of the biopsy sample from the left  lateral apex, and mixed ductal and acinar adenocarcinoma involving 80% of the biopsy sample from the left mid gland.  There was a Deb 4+3=7 mixed ductal and acinar adenocarcinoma involving 40% of the core needle biopsy sample from the left base, and 70% of 2 of 3 biopsy samples from lesion 1 in the left base.  There was a Deb 3+4=7 acinar adenocarcinoma involving 30% of the core needle biopsy sample from the right mid lateral, and an adenocarcinoma involving 20% of the biopsy sample from the right lateral apex.  There was a Deb 3+3=6 acinar adenocarcinoma involving 30% of the core needle biopsy sample from the left apex, 20% of the biopsy sample from the right lateral base, 80% of the biopsy sample from the right base, 40% of 1 of 2 biopsy samples from lesion 2 in the right base, and an adenocarcinoma involving 10% of the biopsy sample from the right mid gland.  There was benign prostatic tissue in the core needle biopsy samples from the left lateral base, and right apex (14/17 samples involved).    Patient opted to proceed with robotic-assisted laparoscopic prostatectomy and bilateral pelvic lymph node dissection 04/29/2020, that revealed a [place] 4+3=7 adenocarcinoma, mixed acinar (80%) and ductal (20%) types, involving 20% of the total prostatic volume.  The tumor was confined to the prostate without extraprostatic extension.  There was no lymphovascular or perineural invasion.  There was no urinary bladder neck invasion and seminal vesicles were not involved.  Surgical margins were tumor free. Nine pelvic lymph nodes were negative for metastases (0/9 lymph nodes involved).  PSA was undetectable at <0.04 ng/mL (09/04/2020) with subsequent biochemical progression with PSA 0.04 ng/mL (12/18/2020), PSA 0.05 ng/mL (03/19/2021), PSA 0.14 ng/mL (06/18/2021). Patient received salvage radiation therapy to the prostate bed (6840 cGy in 38 fractions) and pelvic lymph nodes (4500 cGy in 25 fractions) from  07/14/2021 through 09/07/2021, without androgen deprivation therapy (ADT).     There was a PSA yobany of <0.04 ng/mL (12/09/2021 through 12/29/2022), with subsequent biochemical progression with PSA 0.06 ng/mL (04/11/2023), PSA 0.18 ng/mL (01/04/2024), PSA 0.34 ng/mL (10/17/2024), PSA 0.54 ng/mL (12/19/2024). 68-Ga PSMA-11 PET/CT scan 10/31/2024, revealed postsurgical changes related to prior prostatectomy.  There was no PSMA uptake in the prostatectomy bed.  There were no PSMA-avid metastases.  There was subtle focal PSMA uptake in the left posterior sixth rib without CT correlate with SUV max 1.6 and SUV mean 0.9, and an additional subtle focus of PSMA uptake in the left eighth rib without CT correlate.  Patient has mild positional urinary incontinence particularly when squatting, and nocturia 1-2 times per night.  Patient exercises 6 days a week and plays [employer] ball regularly. There are no other new symptoms or events since the prior clinic visit (12/27/2024).  Patient feels well and denies fever, anorexia, weight loss, headache, cough, dyspnea, chest pain, abdominal pain, nausea, vomiting, constipation, diarrhea, bleeding, or bone pain.     PAST HISTORY: Past history was reviewed and unchanged from the clinic note 12/27/2024.     MEDICATIONS:   Current Outpatient Medications   Medication Sig Dispense Refill     amLODIPine (NORVASC) 10 MG tablet Take 1 tablet (10 mg) by mouth daily 90 tablet 3     atenolol (TENORMIN) 100 MG tablet Take 1 tablet (100 mg) by mouth daily 90 tablet 3     atorvastatin (LIPITOR) 20 MG tablet Take 1 tablet (20 mg) by mouth daily. 90 tablet 0     FISH OIL 1200 MG OR CAPS Take 2 capsules by mouth every other day        lisinopril-hydrochlorothiazide (ZESTORETIC) 20-12.5 MG tablet TAKE 2 TABLETS BY MOUTH EVERY MORNING 180 tablet 0     sildenafil (REVATIO) 20 MG tablet Take 1 tablet (20 mg) by mouth daily as needed (sexual activity) 30 tablet 3     VITAMIN D, CHOLECALCIFEROL, PO Take 1,000  "Units by mouth daily         REVIEW OF SYSTEMS: Review of systems reviewed with the patient and otherwise negative except for those detailed above.    PHYSICAL EXAM: BP (!) 149/79   Pulse 68   Temp 98  F (36.7  C)   Resp 18   Ht 1.829 m (6' 0.01\")   Wt 85.5 kg (188 lb 9.6 oz)   SpO2 99%   BMI 25.57 kg/m   ECOG performance status: 0. Physical exam was unchanged from prior clinic visit 12/27/2024.  Skin: No erythema or rash.  HEENT: Sclera nonicteric. Oropharynx without lesions or ulceration, mucosa pink and moist.  Nodes: No cervical, supraclavicular, axillary, or inguinal adenopathy.  Lungs: No dullness to percussion.  No rales, wheezes, rhonchi.  Heart: Regular rate and rhythm.  Abdomen: Bowel sounds present.  Soft, nontender, no hepatosplenomegaly or mass.  Extremities: No edema.     LABS REVIEWED:   Component      Latest Ref Rng 4/11/2024  2:04 PM 10/17/2024  8:40 AM 12/19/2024  8:27 AM 3/20/2025  9:30 AM   WBC      4.0 - 11.0 10e3/uL    5.4    RBC Count      4.40 - 5.90 10e6/uL    4.64    Hemoglobin      13.3 - 17.7 g/dL    15.1    Hematocrit      40.0 - 53.0 %    42.6    MCV      78 - 100 fL    92    MCH      26.5 - 33.0 pg    32.5    MCHC      31.5 - 36.5 g/dL    35.4    RDW      10.0 - 15.0 %    12.5    Platelet Count      150 - 450 10e3/uL    282    % Neutrophils      %    70    % Lymphocytes      %    19    % Monocytes      %    9    % Eosinophils      %    2    % Basophils      %    1    % Immature Granulocytes      %    0    NRBCs per 100 WBC      <1 /100    0    Absolute Neutrophils      1.6 - 8.3 10e3/uL    3.8    Absolute Lymphocytes      0.8 - 5.3 10e3/uL    1.0    Absolute Monocytes      0.0 - 1.3 10e3/uL    0.5    Absolute Eosinophils      0.0 - 0.7 10e3/uL    0.1    Absolute Basophils      0.0 - 0.2 10e3/uL    0.0    Absolute Immature Granulocytes      <=0.4 10e3/uL    0.0    Absolute NRBCs      10e3/uL    0.0    Sodium      135 - 145 mmol/L    137    Potassium      3.4 - 5.3 mmol/L    4.4  "   Carbon Dioxide (CO2)      22 - 29 mmol/L    26    Anion Gap      7 - 15 mmol/L    11    Urea Nitrogen      8.0 - 23.0 mg/dL    17.8    Creatinine      0.67 - 1.17 mg/dL    1.06    GFR Estimate      >60 mL/min/1.73m2    76    Calcium      8.8 - 10.4 mg/dL    9.7    Chloride      98 - 107 mmol/L    100    Glucose      70 - 99 mg/dL    108 (H)    Alkaline Phosphatase      40 - 150 U/L    81    AST      0 - 45 U/L    29    ALT      0 - 70 U/L    20    Protein Total      6.4 - 8.3 g/dL    7.3    Albumin      3.5 - 5.2 g/dL    4.4    Bilirubin Total      <=1.2 mg/dL    0.6    PSA      0.00 - 4.00 ug/L 0.19  0.34  0.54     PSA Tumor Marker      0.00 - 4.50 ng/mL    0.59      Testosterone pending.    IMPRESSION/PLAN: Stage IIC mixed acinar and ductal adenocarcinoma of the prostate.  Prostate cancer is a Deb 4+3=7 mixed acinar and ductal adenocarcinoma confined to the prostate without extraprostatic extension, seminal vesicle involvement, or regional or distant metastases.  Patient underwent definitive surgery (04/29/2020).  PSA was undetectable postoperatively (09/04/2020) with subsequent biochemical progression with PSA 0.04 ng/mL (12/18/2020) to PSA 0.14 ng/mL (06/18/2021). Patient received salvage radiation therapy to the prostate bed and pelvic lymph nodes without concurrent ADT (from 07/14/2021 through 09/07/2021).  PSA was undetectable x1 year (from 12/09/2021 through 12/29/2022), with subsequent biochemical progression with PSA 0.06 ng/mL (04/11/2023) to PSA 0.54 ng/mL (12/19/2024). PSMA PET/CT scan (10/31/2024) was negative for evidence of recurrence or metastases.  The subtle foci of PSMA uptake in the left posterior sixth rib and left eighth rib were without CT correlate and not suspicious for metastases.  PSA doubling time is calculated at 6-7 months indicating a high risk biochemical recurrence.  Patient may benefit from androgen deprivation therapy (ADT) consisting of leuprolide 22.5 mg every 3 months  plus enzalutamide 160 mg daily in accordance with the EMBARK study that revealed improvement in metastasis-free survival with leuprolide plus enzalutamide with high risk biochemical recurrence (N Engl J Med 2023;389:9888-7623).  Further, enzalutamide monotherapy was superior to leuprolide alone.  Treatment may be discontinued at 9 months if PSA is undetectable.  Alternatively, patient may be eligible for the  phase 1b clinical trial evaluating the safety, tolerability, and efficacy of Xaluritamig in subjects with high-risk biochemical recurrence of nonmetastatic castration sensitive prostate cancer after definitive therapy.  Eligibility for this trial requires a PSA of 0.5 ng/mL or greater and a PSA doubling time of less than 12 months. Xaluritamig is a novel bispecific 2+1 T-cell engager with two STEAP1 binding sites and an anti-CD3 single-chain variable fragment domain that binds T cells to facilitate T-cell-mediated lysis of STEAP1-expressing cells. STEAP1 is overexpressed in most prostate cancers, with little to no expression on normal tissues, and has been associated with poor survival.  A published dose-exploration study of xaluritamig in metastatic castration-resistant prostate cancer demonstrated PSA and objective responses with predominantly low-grade cytokine release syndrome that occurred primarily with the first dose and that resolved with standard management (Cancer Discov 2024; 14(1): 76-89).  Patient is willing to participate in the  clinical trial, but will be traveling to [facility] in early June and he will be ready for screening after his return.  Patient will return to clinic in 3 months with CBC, metabolic panel, PSA. The current and past history, clinical evaluation, reviewing diagnostic tests and viewing images with the patient, and assessment and planning occurred over 30 minutes.       Jesus Haywood MD    cc: MD Brady Mojica MD      Again,  thank you for allowing me to participate in the care of your patient.        Sincerely,        Jesus Haywood MD    Electronically signed

## 2025-03-24 LAB — TESTOST SERPL-MCNC: 436 NG/DL (ref 240–950)

## 2025-04-15 DIAGNOSIS — E78.5 HYPERLIPIDEMIA LDL GOAL <130: ICD-10-CM

## 2025-04-16 RX ORDER — ATORVASTATIN CALCIUM 20 MG/1
20 TABLET, FILM COATED ORAL DAILY
Qty: 90 TABLET | Refills: 0 | Status: SHIPPED | OUTPATIENT
Start: 2025-04-16

## 2025-04-17 SDOH — HEALTH STABILITY: PHYSICAL HEALTH: ON AVERAGE, HOW MANY DAYS PER WEEK DO YOU ENGAGE IN MODERATE TO STRENUOUS EXERCISE (LIKE A BRISK WALK)?: 7 DAYS

## 2025-04-17 SDOH — HEALTH STABILITY: PHYSICAL HEALTH: ON AVERAGE, HOW MANY MINUTES DO YOU ENGAGE IN EXERCISE AT THIS LEVEL?: 60 MIN

## 2025-04-17 ASSESSMENT — SOCIAL DETERMINANTS OF HEALTH (SDOH): HOW OFTEN DO YOU GET TOGETHER WITH FRIENDS OR RELATIVES?: MORE THAN THREE TIMES A WEEK

## 2025-04-22 ENCOUNTER — OFFICE VISIT (OUTPATIENT)
Dept: FAMILY MEDICINE | Facility: CLINIC | Age: 70
End: 2025-04-22
Attending: FAMILY MEDICINE
Payer: COMMERCIAL

## 2025-04-22 VITALS
HEIGHT: 72 IN | WEIGHT: 186.2 LBS | TEMPERATURE: 97 F | DIASTOLIC BLOOD PRESSURE: 74 MMHG | BODY MASS INDEX: 25.22 KG/M2 | HEART RATE: 59 BPM | SYSTOLIC BLOOD PRESSURE: 138 MMHG | RESPIRATION RATE: 16 BRPM | OXYGEN SATURATION: 99 %

## 2025-04-22 DIAGNOSIS — E78.5 HYPERLIPIDEMIA LDL GOAL <130: ICD-10-CM

## 2025-04-22 DIAGNOSIS — I10 BENIGN ESSENTIAL HTN: ICD-10-CM

## 2025-04-22 DIAGNOSIS — Z00.00 ENCOUNTER FOR MEDICARE ANNUAL WELLNESS EXAM: Primary | ICD-10-CM

## 2025-04-22 DIAGNOSIS — R73.01 IMPAIRED FASTING BLOOD SUGAR: ICD-10-CM

## 2025-04-22 LAB
EST. AVERAGE GLUCOSE BLD GHB EST-MCNC: 103 MG/DL
HBA1C MFR BLD: 5.2 % (ref 0–5.6)

## 2025-04-22 PROCEDURE — G0439 PPPS, SUBSEQ VISIT: HCPCS | Performed by: FAMILY MEDICINE

## 2025-04-22 PROCEDURE — 3075F SYST BP GE 130 - 139MM HG: CPT | Performed by: FAMILY MEDICINE

## 2025-04-22 PROCEDURE — 36415 COLL VENOUS BLD VENIPUNCTURE: CPT | Performed by: FAMILY MEDICINE

## 2025-04-22 PROCEDURE — 80053 COMPREHEN METABOLIC PANEL: CPT | Performed by: FAMILY MEDICINE

## 2025-04-22 PROCEDURE — 1126F AMNT PAIN NOTED NONE PRSNT: CPT | Performed by: FAMILY MEDICINE

## 2025-04-22 PROCEDURE — 90480 ADMN SARSCOV2 VAC 1/ONLY CMP: CPT | Performed by: FAMILY MEDICINE

## 2025-04-22 PROCEDURE — 3078F DIAST BP <80 MM HG: CPT | Performed by: FAMILY MEDICINE

## 2025-04-22 PROCEDURE — 80061 LIPID PANEL: CPT | Performed by: FAMILY MEDICINE

## 2025-04-22 PROCEDURE — 99214 OFFICE O/P EST MOD 30 MIN: CPT | Mod: 25 | Performed by: FAMILY MEDICINE

## 2025-04-22 PROCEDURE — 83036 HEMOGLOBIN GLYCOSYLATED A1C: CPT | Performed by: FAMILY MEDICINE

## 2025-04-22 PROCEDURE — 91320 SARSCV2 VAC 30MCG TRS-SUC IM: CPT | Performed by: FAMILY MEDICINE

## 2025-04-22 RX ORDER — ATENOLOL 100 MG/1
100 TABLET ORAL DAILY
Qty: 90 TABLET | Refills: 3 | Status: SHIPPED | OUTPATIENT
Start: 2025-04-22

## 2025-04-22 RX ORDER — LISINOPRIL AND HYDROCHLOROTHIAZIDE 12.5; 2 MG/1; MG/1
TABLET ORAL
Qty: 180 TABLET | Refills: 3 | Status: SHIPPED | OUTPATIENT
Start: 2025-06-01

## 2025-04-22 RX ORDER — AMLODIPINE BESYLATE 10 MG/1
10 TABLET ORAL DAILY
Qty: 90 TABLET | Refills: 3 | Status: SHIPPED | OUTPATIENT
Start: 2025-04-22

## 2025-04-22 RX ORDER — ATORVASTATIN CALCIUM 20 MG/1
20 TABLET, FILM COATED ORAL DAILY
Qty: 90 TABLET | Refills: 3 | Status: SHIPPED | OUTPATIENT
Start: 2025-07-01

## 2025-04-22 ASSESSMENT — PAIN SCALES - GENERAL: PAINLEVEL_OUTOF10: NO PAIN (0)

## 2025-04-22 NOTE — PROGRESS NOTES
"Preventive Care Visit  [facility]  Chance Terra Guzmán MD, MD, Family Medicine  Apr 22, 2025      Assessment & Plan     Encounter for Medicare annual wellness exam       Impaired fasting blood sugar     - HEMOGLOBIN A1C; Future  - HEMOGLOBIN A1C    Hyperlipidemia LDL goal <130  Patient is tolerating current medication without any major side effects of concerns and current dose seems reasonable too.  Current medication regime is effective. Continue current treatment without any changes.   - Lipid panel reflex to direct LDL Fasting; Future  - Comprehensive metabolic panel (BMP + Alb, Alk Phos, ALT, AST, Total. Bili, TP); Future  - atorvastatin (LIPITOR) 20 MG tablet; Take 1 tablet (20 mg) by mouth daily.  - Lipid panel reflex to direct LDL Fasting  - Comprehensive metabolic panel (BMP + Alb, Alk Phos, ALT, AST, Total. Bili, TP)    Benign essential HTN  Patient is tolerating current medication without any major side effects of concerns and current dose seems reasonable too.  Current medication regime is effective. Continue current treatment without any changes.   - amLODIPine (NORVASC) 10 MG tablet; Take 1 tablet (10 mg) by mouth daily.  - atenolol (TENORMIN) 100 MG tablet; Take 1 tablet (100 mg) by mouth daily.  - lisinopril-hydrochlorothiazide (ZESTORETIC) 20-12.5 MG tablet; TAKE 2 TABLETS BY MOUTH EVERY MORNING            BMI  Estimated body mass index is 25.61 kg/m  as calculated from the following:    Height as of this encounter: 1.816 m (5' 11.5\").    Weight as of this encounter: 84.5 kg (186 lb 3.2 oz).       Counseling  Appropriate preventive services were addressed with this patient via screening, questionnaire, or discussion as appropriate for fall prevention, nutrition, physical activity, Tobacco-use cessation, social engagement, weight loss and cognition.  Checklist reviewing preventive services available has been given to the patient.  Reviewed patient's diet, addressing " concerns and/or questions.           Luis Ashley is a 69 year old, presenting for the following:  Medicare Visit        4/22/2025     7:42 AM   Additional Questions   Roomed by Cheryl CRUZ     Been to urologist and oncologist. S/p prostrate surgery for prostate cancer and now psa is slowly creeping up.   Currently monitoring and going to recheck in few months.   Clinically feeling fine.     Had hernia surgery in the past. Right groin feels different.     Advance Care Planning    Document on file is a Health Care Directive or POLST.        4/17/2025   General Health   How would you rate your overall physical health? Good   Feel stress (tense, anxious, or unable to sleep) Not at all         4/17/2025   Nutrition   Diet: Regular (no restrictions)         4/17/2025   Exercise   Days per week of moderate/strenous exercise 7 days   Average minutes spent exercising at this level 60 min         4/17/2025   Social Factors   Frequency of gathering with friends or relatives More than three times a week   Worry food won't last until get money to buy more No   Food not last or not have enough money for food? No   Do you have housing? (Housing is defined as stable permanent housing and does not include staying ouside in a car, in a tent, in an abandoned building, in an overnight shelter, or couch-surfing.) Yes   Are you worried about losing your housing? No   Lack of transportation? No   Unable to get utilities (heat,electricity)? No         4/17/2025   Fall Risk   Fallen 2 or more times in the past year? No   Trouble with walking or balance? No          4/17/2025   Activities of Daily Living- Home Safety   Needs help with the following daily activites None of the above   Safety concerns in the home None of the above         4/17/2025   Dental   Dentist two times every year? Yes         4/17/2025   Hearing Screening   Hearing concerns? None of the above         4/17/2025   Driving Risk Screening   Patient/family  members have concerns about driving No         4/17/2025   General Alertness/Fatigue Screening   Have you been more tired than usual lately? No         4/17/2025   Urinary Incontinence Screening   Bothered by leaking urine in past 6 months No         Today's PHQ-2 Score:       4/22/2025     7:34 AM   PHQ-2 ( 1999 [employer])   Q1: Little interest or pleasure in doing things 0   Q2: Feeling down, depressed or hopeless 0   PHQ-2 Score 0    Q1: Little interest or pleasure in doing things Not at all   Q2: Feeling down, depressed or hopeless Not at all   PHQ-2 Score 0       Patient-reported           4/17/2025   Substance Use   Alcohol more than 3/day or more than 7/wk No   Do you have a current opioid prescription? No   How severe/bad is pain from 1 to 10? 2/10   Do you use any other substances recreationally? No     Social History     Tobacco Use    Smoking status: Never    Smokeless tobacco: Never   Vaping Use    Vaping status: Never Used   Substance Use Topics    Alcohol use: Not Currently     Comment: 1 drink every two weeks. During Football Season    Drug use: No           4/17/2025   AAA Screening   Family history of Abdominal Aortic Aneurysm (AAA)? No   Last PSA:   PSA   Date Value Ref Range Status   10/14/2019 9.32 (H) 0 - 4 ug/L Final     Comment:     Assay Method:  Chemiluminescence using Siemens Vista analyzer     PSA Tumor Marker   Date Value Ref Range Status   03/20/2025 0.59 0.00 - 4.50 ng/mL Final   12/19/2024 0.54 0.00 - 4.00 ug/L Final     ASCVD Risk   The 10-year ASCVD risk score (Emily BARGER, et al., 2019) is: 16.2%    Values used to calculate the score:      Age: 69 years      Sex: Male      Is Non- : No      Diabetic: No      Tobacco smoker: No      Systolic Blood Pressure: 138 mmHg      Is BP treated: Yes      HDL Cholesterol: 62 mg/dL      Total Cholesterol: 156 mg/dL            Reviewed and updated as needed this visit by Provider                      Current providers  "sharing in care for this patient include:  Patient Care Team:  Chance Guzmán MD as PCP - General (Family Medicine)  Bakari Mckeon MD as MD (Urology)  Brady Flannery MD as Assigned Cancer Care Provider  Bud Morales MD as MD (Surgery)  Toña Ortega MD as MD (Family Medicine)  Chance Guzmán MD as Assigned PCP  Jesus Haywood MD as Physician (Internal Medicine-Hematology & Oncology)  Lana Mayberry, RN as Specialty Care Coordinator (Hematology & Oncology)    The following health maintenance items are reviewed in Epic and correct as of today:  Health Maintenance   Topic Date Due    DTAP/TDAP/TD IMMUNIZATION (2 - Td or Tdap) 12/20/2023    A1C  11/16/2024    LIPID  11/16/2024    COVID-19 Vaccine (9 - 2024-25 season) 03/23/2025    ANNUAL REVIEW OF HM ORDERS  04/15/2025    MEDICARE ANNUAL WELLNESS VISIT  04/15/2025    BMP  03/20/2026    FALL RISK ASSESSMENT  04/22/2026    DIABETES SCREENING  03/20/2028    ADVANCE CARE PLANNING  09/09/2029    COLORECTAL CANCER SCREENING  03/09/2031    HEPATITIS C SCREENING  Completed    PHQ-2 (once per calendar year)  Completed    INFLUENZA VACCINE  Completed    Pneumococcal Vaccine: 50+ Years  Completed    ZOSTER IMMUNIZATION  Completed    RSV VACCINE  Completed    HPV IMMUNIZATION  Aged Out    MENINGITIS IMMUNIZATION  Aged Out            Objective    Exam  /74 (BP Location: Right arm, Patient Position: Sitting, Cuff Size: Adult Regular)   Pulse 59   Temp 97  F (36.1  C) (Temporal)   Resp 16   Ht 1.816 m (5' 11.5\")   Wt 84.5 kg (186 lb 3.2 oz)   SpO2 99%   BMI 25.61 kg/m     Estimated body mass index is 25.61 kg/m  as calculated from the following:    Height as of this encounter: 1.816 m (5' 11.5\").    Weight as of this encounter: 84.5 kg (186 lb 3.2 oz).    Physical Exam  GENERAL: alert and no distress  EYES: Eyes grossly normal to inspection, PERRL and conjunctivae and sclerae normal  HENT: ear canals and TM's " normal, nose and mouth without ulcers or lesions  NECK: no adenopathy, no asymmetry, masses, or scars  RESP: lungs clear to auscultation - no rales, rhonchi or wheezes  CV: regular rate and rhythm, normal S1 S2, no S3 or S4, no murmur, click or rub, no peripheral edema  ABDOMEN: soft, nontender, no hepatosplenomegaly, no masses and bowel sounds normal   (male): normal male genitalia without lesions or urethral discharge, no hernia, right groin some muscle weakness on valsalva.   MS: no gross musculoskeletal defects noted, no edema  SKIN: no suspicious lesions or rashes  NEURO: Normal strength and tone, mentation intact and speech normal  PSYCH: mentation appears normal, affect normal/bright        4/22/2025   Mini Cog   Clock Draw Score 2 Normal   3 Item Recall 3 objects recalled   Mini Cog Total Score 5             4/4/2022   Vision Screen   Patient wears corrective lenses (select all that apply) Worn during vision screen   Vision Screen Results Pass   Vision Screen Results- Second Attempt Pass       Signed Electronically by: Chance Guzmán MD, MD

## 2025-04-22 NOTE — PATIENT INSTRUCTIONS
Patient Education   Preventive Care Advice   This is general advice given by our system to help you stay healthy. However, your care team may have specific advice just for you. Please talk to your care team about your preventive care needs.  Nutrition  Eat 5 or more servings of fruits and vegetables each day.  Try wheat bread, brown rice and whole grain pasta (instead of white bread, rice, and pasta).  Get enough calcium and vitamin D. Check the label on foods and aim for 100% of the RDA (recommended daily allowance).  Lifestyle  Exercise at least 150 minutes each week  (30 minutes a day, 5 days a week).  Do muscle strengthening activities 2 days a week. These help control your weight and prevent disease.  No smoking.  Wear sunscreen to prevent skin cancer.  Have a dental exam and cleaning every 6 months.  Yearly exams  See your health care team every year to talk about:  Any changes in your health.  Any medicines your care team has prescribed.  Preventive care, family planning, and ways to prevent chronic diseases.  Shots (vaccines)   HPV shots (up to age 26), if you've never had them before.  Hepatitis B shots (up to age 59), if you've never had them before.  COVID-19 shot: Get this shot when it's due.  Flu shot: Get a flu shot every year.  Tetanus shot: Get a tetanus shot every 10 years.  Pneumococcal, hepatitis A, and RSV shots: Ask your care team if you need these based on your risk.  Shingles shot (for age 50 and up)  General health tests  Diabetes screening:  Starting at age 35, Get screened for diabetes at least every 3 years.  If you are younger than age 35, ask your care team if you should be screened for diabetes.  Cholesterol test: At age 39, start having a cholesterol test every 5 years, or more often if advised.  Bone density scan (DEXA): At age 50, ask your care team if you should have this scan for osteoporosis (brittle bones).  Hepatitis C: Get tested at least once in your life.  STIs (sexually  transmitted infections)  Before age 24: Ask your care team if you should be screened for STIs.  After age 24: Get screened for STIs if you're at risk. You are at risk for STIs (including HIV) if:  You are sexually active with more than one person.  You don't use condoms every time.  You or a partner was diagnosed with a sexually transmitted infection.  If you are at risk for HIV, ask about PrEP medicine to prevent HIV.  Get tested for HIV at least once in your life, whether you are at risk for HIV or not.  Cancer screening tests  Cervical cancer screening: If you have a cervix, begin getting regular cervical cancer screening tests starting at age 21.  Breast cancer scan (mammogram): If you've ever had breasts, begin having regular mammograms starting at age 40. This is a scan to check for breast cancer.  Colon cancer screening: It is important to start screening for colon cancer at age 45.  Have a colonoscopy test every 10 years (or more often if you're at risk) Or, ask your provider about stool tests like a FIT test every year or Cologuard test every 3 years.  To learn more about your testing options, visit:   .  For help making a decision, visit:   https://bit.ly/uq46609.  Prostate cancer screening test: If you have a prostate, ask your care team if a prostate cancer screening test (PSA) at age 55 is right for you.  Lung cancer screening: If you are a current or former smoker ages 50 to 80, ask your care team if ongoing lung cancer screenings are right for you.  For informational purposes only. Not to replace the advice of your health care provider. Copyright   2023 [employer]. All rights reserved. Clinically reviewed by the [facility] Transitions Program. [employer] [id number] - REV 01/24.

## 2025-04-23 LAB
ALBUMIN SERPL BCG-MCNC: 4.4 G/DL (ref 3.5–5.2)
ALP SERPL-CCNC: 83 U/L (ref 40–150)
ALT SERPL W P-5'-P-CCNC: 21 U/L (ref 0–70)
ANION GAP SERPL CALCULATED.3IONS-SCNC: 8 MMOL/L (ref 7–15)
AST SERPL W P-5'-P-CCNC: 28 U/L (ref 0–45)
BILIRUB SERPL-MCNC: 0.7 MG/DL
BUN SERPL-MCNC: 15.3 MG/DL (ref 8–23)
CALCIUM SERPL-MCNC: 9.5 MG/DL (ref 8.8–10.4)
CHLORIDE SERPL-SCNC: 101 MMOL/L (ref 98–107)
CHOLEST SERPL-MCNC: 157 MG/DL
CREAT SERPL-MCNC: 1.11 MG/DL (ref 0.67–1.17)
EGFRCR SERPLBLD CKD-EPI 2021: 72 ML/MIN/1.73M2
FASTING STATUS PATIENT QL REPORTED: YES
FASTING STATUS PATIENT QL REPORTED: YES
GLUCOSE SERPL-MCNC: 122 MG/DL (ref 70–99)
HCO3 SERPL-SCNC: 29 MMOL/L (ref 22–29)
HDLC SERPL-MCNC: 65 MG/DL
LDLC SERPL CALC-MCNC: 72 MG/DL
NONHDLC SERPL-MCNC: 92 MG/DL
POTASSIUM SERPL-SCNC: 4.6 MMOL/L (ref 3.4–5.3)
PROT SERPL-MCNC: 7 G/DL (ref 6.4–8.3)
SODIUM SERPL-SCNC: 138 MMOL/L (ref 135–145)
TRIGL SERPL-MCNC: 102 MG/DL

## 2025-04-23 NOTE — RESULT ENCOUNTER NOTE
Cholesterol, kidney, liver function test are fine.  Glucose is on higher side but confirmatory test to rule out diabetes is within normal limit which is reassuring.    Chance Guzmán MD  [facility]

## 2025-06-20 ENCOUNTER — APPOINTMENT (OUTPATIENT)
Dept: LAB | Facility: CLINIC | Age: 70
End: 2025-06-20
Attending: INTERNAL MEDICINE
Payer: COMMERCIAL

## 2025-07-03 ENCOUNTER — LAB (OUTPATIENT)
Dept: LAB | Facility: CLINIC | Age: 70
End: 2025-07-03
Attending: FAMILY MEDICINE
Payer: COMMERCIAL

## 2025-07-03 ENCOUNTER — ANCILLARY PROCEDURE (OUTPATIENT)
Facility: CLINIC | Age: 70
End: 2025-07-03
Attending: STUDENT IN AN ORGANIZED HEALTH CARE EDUCATION/TRAINING PROGRAM
Payer: COMMERCIAL

## 2025-07-03 ENCOUNTER — RESULTS FOLLOW-UP (OUTPATIENT)
Dept: RESEARCH | Facility: CLINIC | Age: 70
End: 2025-07-03

## 2025-07-03 DIAGNOSIS — C61 PROSTATE CANCER (H): ICD-10-CM

## 2025-07-03 DIAGNOSIS — R97.21 RISING PSA FOLLOWING TREATMENT FOR MALIGNANT NEOPLASM OF PROSTATE: ICD-10-CM

## 2025-07-03 DIAGNOSIS — Z11.59 ENCOUNTER FOR SCREENING FOR OTHER VIRAL DISEASES: ICD-10-CM

## 2025-07-03 LAB
ALBUMIN SERPL BCG-MCNC: 4.5 G/DL (ref 3.5–5.2)
ALBUMIN UR-MCNC: NEGATIVE MG/DL
ALP SERPL-CCNC: 85 U/L (ref 40–150)
ALT SERPL W P-5'-P-CCNC: 18 U/L (ref 0–70)
ANION GAP SERPL CALCULATED.3IONS-SCNC: 10 MMOL/L (ref 7–15)
APPEARANCE UR: CLEAR
AST SERPL W P-5'-P-CCNC: 32 U/L (ref 0–45)
BASOPHILS # BLD AUTO: 0 10E3/UL (ref 0–0.2)
BASOPHILS NFR BLD AUTO: 1 %
BILIRUB SERPL-MCNC: 0.9 MG/DL
BILIRUB UR QL STRIP: NEGATIVE
BILIRUBIN DIRECT (ROCHE PRO & PURE): 0.35 MG/DL (ref 0–0.45)
BUN SERPL-MCNC: 16.5 MG/DL (ref 8–23)
CALCIUM SERPL-MCNC: 9.5 MG/DL (ref 8.8–10.4)
CHLORIDE SERPL-SCNC: 98 MMOL/L (ref 98–107)
CK SERPL-CCNC: 233 U/L (ref 39–308)
COLOR UR AUTO: ABNORMAL
CREAT SERPL-MCNC: 1.16 MG/DL (ref 0.67–1.17)
CRP SERPL-MCNC: <3 MG/L
EGFRCR SERPLBLD CKD-EPI 2021: 69 ML/MIN/1.73M2
EOSINOPHIL # BLD AUTO: 0.1 10E3/UL (ref 0–0.7)
EOSINOPHIL NFR BLD AUTO: 1 %
ERYTHROCYTE [DISTWIDTH] IN BLOOD BY AUTOMATED COUNT: 12.4 % (ref 10–15)
GLUCOSE SERPL-MCNC: 127 MG/DL (ref 70–99)
GLUCOSE UR STRIP-MCNC: NEGATIVE MG/DL
HBV CORE AB SERPL QL IA: NONREACTIVE
HBV SURFACE AG SERPL QL IA: NONREACTIVE
HCO3 SERPL-SCNC: 27 MMOL/L (ref 22–29)
HCT VFR BLD AUTO: 41.2 % (ref 40–53)
HCV AB SERPL QL IA: NONREACTIVE
HGB BLD-MCNC: 14.5 G/DL (ref 13.3–17.7)
HGB UR QL STRIP: NEGATIVE
HIV 1+2 AB+HIV1 P24 AG SERPL QL IA: NONREACTIVE
IMM GRANULOCYTES # BLD: 0 10E3/UL
IMM GRANULOCYTES NFR BLD: 0 %
KETONES UR STRIP-MCNC: NEGATIVE MG/DL
LEUKOCYTE ESTERASE UR QL STRIP: NEGATIVE
LYMPHOCYTES # BLD AUTO: 1 10E3/UL (ref 0.8–5.3)
LYMPHOCYTES NFR BLD AUTO: 18 %
MAGNESIUM SERPL-MCNC: 2.1 MG/DL (ref 1.7–2.3)
MCH RBC QN AUTO: 32.4 PG (ref 26.5–33)
MCHC RBC AUTO-ENTMCNC: 35.2 G/DL (ref 31.5–36.5)
MCV RBC AUTO: 92 FL (ref 78–100)
MONOCYTES # BLD AUTO: 0.5 10E3/UL (ref 0–1.3)
MONOCYTES NFR BLD AUTO: 8 %
MUCOUS THREADS #/AREA URNS LPF: PRESENT /LPF
NEUTROPHILS # BLD AUTO: 3.9 10E3/UL (ref 1.6–8.3)
NEUTROPHILS NFR BLD AUTO: 72 %
NITRATE UR QL: NEGATIVE
NRBC # BLD AUTO: 0 10E3/UL
NRBC BLD AUTO-RTO: 0 /100
PH UR STRIP: 8 [PH] (ref 5–7)
PHOSPHATE SERPL-MCNC: 2.9 MG/DL (ref 2.5–4.5)
PLATELET # BLD AUTO: 282 10E3/UL (ref 150–450)
POTASSIUM SERPL-SCNC: 4 MMOL/L (ref 3.4–5.3)
PROT SERPL-MCNC: 7.2 G/DL (ref 6.4–8.3)
PSA SERPL DL<=0.01 NG/ML-MCNC: 0.73 NG/ML (ref 0–4.5)
RBC # BLD AUTO: 4.47 10E6/UL (ref 4.4–5.9)
RBC URINE: 1 /HPF
RETICS # AUTO: 0.07 10E6/UL (ref 0.03–0.1)
RETICS/RBC NFR AUTO: 1.6 % (ref 0.5–2)
SODIUM SERPL-SCNC: 135 MMOL/L (ref 135–145)
SP GR UR STRIP: 1.02 (ref 1–1.03)
URATE SERPL-MCNC: 5.5 MG/DL (ref 3.4–7)
UROBILINOGEN UR STRIP-MCNC: NORMAL MG/DL
WBC # BLD AUTO: 5.5 10E3/UL (ref 4–11)
WBC URINE: 1 /HPF

## 2025-07-03 PROCEDURE — 84153 ASSAY OF PSA TOTAL: CPT

## 2025-07-03 PROCEDURE — 86704 HEP B CORE ANTIBODY TOTAL: CPT

## 2025-07-03 PROCEDURE — 86140 C-REACTIVE PROTEIN: CPT

## 2025-07-03 PROCEDURE — 36415 COLL VENOUS BLD VENIPUNCTURE: CPT

## 2025-07-03 PROCEDURE — 99207 CT RESEARCH READING: CPT | Performed by: RADIOLOGY

## 2025-07-03 PROCEDURE — 84550 ASSAY OF BLOOD/URIC ACID: CPT

## 2025-07-03 PROCEDURE — 84403 ASSAY OF TOTAL TESTOSTERONE: CPT

## 2025-07-03 PROCEDURE — 84100 ASSAY OF PHOSPHORUS: CPT

## 2025-07-03 PROCEDURE — 85004 AUTOMATED DIFF WBC COUNT: CPT

## 2025-07-03 PROCEDURE — 82248 BILIRUBIN DIRECT: CPT

## 2025-07-03 PROCEDURE — 87340 HEPATITIS B SURFACE AG IA: CPT

## 2025-07-03 PROCEDURE — 85045 AUTOMATED RETICULOCYTE COUNT: CPT

## 2025-07-03 PROCEDURE — 84132 ASSAY OF SERUM POTASSIUM: CPT

## 2025-07-03 PROCEDURE — 82550 ASSAY OF CK (CPK): CPT

## 2025-07-03 PROCEDURE — 87389 HIV-1 AG W/HIV-1&-2 AB AG IA: CPT

## 2025-07-03 PROCEDURE — 81001 URINALYSIS AUTO W/SCOPE: CPT

## 2025-07-03 PROCEDURE — 86803 HEPATITIS C AB TEST: CPT

## 2025-07-03 PROCEDURE — 83735 ASSAY OF MAGNESIUM: CPT

## 2025-07-05 LAB — TESTOST SERPL-MCNC: 411 NG/DL (ref 240–950)

## 2025-07-08 ENCOUNTER — HOSPITAL ENCOUNTER (OUTPATIENT)
Dept: PET IMAGING | Facility: CLINIC | Age: 70
Setting detail: NUCLEAR MEDICINE
Discharge: HOME | End: 2025-07-08
Attending: STUDENT IN AN ORGANIZED HEALTH CARE EDUCATION/TRAINING PROGRAM
Payer: COMMERCIAL

## 2025-07-08 ENCOUNTER — HOSPITAL ENCOUNTER (OUTPATIENT)
Age: 70
End: 2025-07-08
Admitting: INTERNAL MEDICINE
Payer: COMMERCIAL

## 2025-07-08 ENCOUNTER — ALLIED HEALTH/NURSE VISIT (OUTPATIENT)
Dept: ONCOLOGY | Facility: CLINIC | Age: 70
End: 2025-07-08

## 2025-07-08 DIAGNOSIS — R97.21 RISING PSA FOLLOWING TREATMENT FOR MALIGNANT NEOPLASM OF PROSTATE: ICD-10-CM

## 2025-07-08 DIAGNOSIS — C61 PROSTATE CANCER (H): Primary | ICD-10-CM

## 2025-07-08 DIAGNOSIS — Z19.1 HORMONE SENSITIVE PROSTATE CANCER (H): Primary | ICD-10-CM

## 2025-07-08 DIAGNOSIS — I10 BENIGN ESSENTIAL HTN: ICD-10-CM

## 2025-07-08 DIAGNOSIS — C61 PROSTATE CANCER (H): ICD-10-CM

## 2025-07-08 DIAGNOSIS — R73.01 IMPAIRED FASTING BLOOD SUGAR: ICD-10-CM

## 2025-07-08 DIAGNOSIS — I10 UNCONTROLLED HYPERTENSION: ICD-10-CM

## 2025-07-08 DIAGNOSIS — E78.5 HYPERLIPIDEMIA LDL GOAL <130: ICD-10-CM

## 2025-07-08 DIAGNOSIS — C61 HORMONE SENSITIVE PROSTATE CANCER (H): Primary | ICD-10-CM

## 2025-07-08 DIAGNOSIS — K40.90 RIGHT INGUINAL HERNIA: ICD-10-CM

## 2025-07-08 PROCEDURE — 250N000011 HC RX IP 250 OP 636: Performed by: STUDENT IN AN ORGANIZED HEALTH CARE EDUCATION/TRAINING PROGRAM

## 2025-07-08 PROCEDURE — 78815 PET IMAGE W/CT SKULL-THIGH: CPT | Mod: PS

## 2025-07-08 PROCEDURE — 74177 CT ABD & PELVIS W/CONTRAST: CPT

## 2025-07-08 PROCEDURE — 343N000001 HC RX 343 MED OP 636: Performed by: STUDENT IN AN ORGANIZED HEALTH CARE EDUCATION/TRAINING PROGRAM

## 2025-07-08 PROCEDURE — A9596 HC RX 343 MED OP 636: HCPCS | Performed by: STUDENT IN AN ORGANIZED HEALTH CARE EDUCATION/TRAINING PROGRAM

## 2025-07-08 RX ORDER — IOPAMIDOL 755 MG/ML
10-135 INJECTION, SOLUTION INTRAVASCULAR ONCE
Status: COMPLETED | OUTPATIENT
Start: 2025-07-08 | End: 2025-07-08

## 2025-07-08 RX ADMIN — KIT FOR THE PREPARATION OF GALLIUM GA 68 GOZETOTIDE INJECTION 5.24 MILLICURIE: KIT INTRAVENOUS at 07:21

## 2025-07-08 RX ADMIN — IOPAMIDOL 112 ML: 755 INJECTION, SOLUTION INTRAVENOUS at 08:16

## 2025-07-11 PROBLEM — Z19.1 HORMONE SENSITIVE PROSTATE CANCER (H): Status: ACTIVE | Noted: 2025-07-11

## 2025-07-11 PROBLEM — C61 HORMONE SENSITIVE PROSTATE CANCER (H): Status: ACTIVE | Noted: 2025-07-11

## 2025-07-14 ENCOUNTER — PATIENT OUTREACH (OUTPATIENT)
Dept: ONCOLOGY | Facility: CLINIC | Age: 70
End: 2025-07-14
Payer: COMMERCIAL

## 2025-07-14 NOTE — CONFIDENTIAL NOTE
[id number]: Study Follow-Up Note   Subject name: Dion Bowman     Date: 7/14/2025    He was contacted by the clinical research coordinator in regards to his admission to  tomorrow for his C1D1. Calling to ensure he feels ready and has his night before pre-med, dexamethasone, to take this evening. Pt is ready and has no further questions or concerns at this time.    Dion Bowman was given the opportunity to ask any trial related questions.    Dion MONTERO Bowman expressed any concerns: benjamín Scott RN

## 2025-07-15 ENCOUNTER — ALLIED HEALTH/NURSE VISIT (OUTPATIENT)
Dept: ONCOLOGY | Facility: CLINIC | Age: 70
End: 2025-07-15

## 2025-07-15 DIAGNOSIS — C61 PROSTATE CANCER (H): Primary | ICD-10-CM

## 2025-07-15 LAB
ALBUMIN SERPL BCG-MCNC: 4.2 G/DL (ref 3.5–5.2)
ALP SERPL-CCNC: 74 U/L (ref 40–150)
ALT SERPL W P-5'-P-CCNC: 17 U/L (ref 0–70)
ANION GAP SERPL CALCULATED.3IONS-SCNC: 13 MMOL/L (ref 7–15)
AST SERPL W P-5'-P-CCNC: 28 U/L (ref 0–45)
BASOPHILS # BLD AUTO: 0 10E3/UL (ref 0–0.2)
BASOPHILS NFR BLD AUTO: 0 %
BILIRUB SERPL-MCNC: 0.6 MG/DL
BILIRUBIN DIRECT (ROCHE PRO & PURE): 0.25 MG/DL (ref 0–0.45)
BUN SERPL-MCNC: 18.2 MG/DL (ref 8–23)
CALCIUM SERPL-MCNC: 9.5 MG/DL (ref 8.8–10.4)
CHLORIDE SERPL-SCNC: 98 MMOL/L (ref 98–107)
CK SERPL-CCNC: 172 U/L (ref 39–308)
CREAT SERPL-MCNC: 1.06 MG/DL (ref 0.67–1.17)
CRP SERPL-MCNC: <3 MG/L
EGFRCR SERPLBLD CKD-EPI 2021: 76 ML/MIN/1.73M2
EOSINOPHIL # BLD AUTO: 0 10E3/UL (ref 0–0.7)
EOSINOPHIL NFR BLD AUTO: 0 %
ERYTHROCYTE [DISTWIDTH] IN BLOOD BY AUTOMATED COUNT: 12.4 % (ref 10–15)
FERRITIN SERPL-MCNC: 415 NG/ML (ref 31–409)
GLUCOSE SERPL-MCNC: 212 MG/DL (ref 70–99)
HCO3 SERPL-SCNC: 21 MMOL/L (ref 22–29)
HCT VFR BLD AUTO: 40.9 % (ref 40–53)
HGB BLD-MCNC: 14.7 G/DL (ref 13.3–17.7)
IMM GRANULOCYTES # BLD: 0.1 10E3/UL
IMM GRANULOCYTES NFR BLD: 1 %
LACTATE SERPL-SCNC: 1.8 MMOL/L (ref 0.7–2)
LACTATE SERPL-SCNC: 2.2 MMOL/L (ref 0.7–2)
LDH SERPL L TO P-CCNC: 202 U/L (ref 0–250)
LYMPHOCYTES # BLD AUTO: 0.3 10E3/UL (ref 0.8–5.3)
LYMPHOCYTES NFR BLD AUTO: 2 %
MAGNESIUM SERPL-MCNC: 1.9 MG/DL (ref 1.7–2.3)
MCH RBC QN AUTO: 32.2 PG (ref 26.5–33)
MCHC RBC AUTO-ENTMCNC: 35.9 G/DL (ref 31.5–36.5)
MCV RBC AUTO: 90 FL (ref 78–100)
MONOCYTES # BLD AUTO: 0.3 10E3/UL (ref 0–1.3)
MONOCYTES NFR BLD AUTO: 2 %
NEUTROPHILS # BLD AUTO: 13.9 10E3/UL (ref 1.6–8.3)
NEUTROPHILS NFR BLD AUTO: 95 %
NRBC # BLD AUTO: 0 10E3/UL
NRBC BLD AUTO-RTO: 0 /100
PHOSPHATE SERPL-MCNC: 3 MG/DL (ref 2.5–4.5)
PLATELET # BLD AUTO: 232 10E3/UL (ref 150–450)
POTASSIUM SERPL-SCNC: 4.3 MMOL/L (ref 3.4–5.3)
PROT SERPL-MCNC: 6.9 G/DL (ref 6.4–8.3)
RBC # BLD AUTO: 4.57 10E6/UL (ref 4.4–5.9)
RETICS # AUTO: 0.07 10E6/UL (ref 0.03–0.1)
RETICS/RBC NFR AUTO: 1.6 % (ref 0.5–2)
SODIUM SERPL-SCNC: 132 MMOL/L (ref 135–145)
URATE SERPL-MCNC: 5.3 MG/DL (ref 3.4–7)
WBC # BLD AUTO: 14.6 10E3/UL (ref 4–11)

## 2025-07-15 PROCEDURE — 83735 ASSAY OF MAGNESIUM: CPT | Performed by: STUDENT IN AN ORGANIZED HEALTH CARE EDUCATION/TRAINING PROGRAM

## 2025-07-15 PROCEDURE — 85018 HEMOGLOBIN: CPT | Performed by: STUDENT IN AN ORGANIZED HEALTH CARE EDUCATION/TRAINING PROGRAM

## 2025-07-15 PROCEDURE — 300N000004 RESEARCH KIT COLLECTION: Performed by: INTERNAL MEDICINE

## 2025-07-15 PROCEDURE — 80053 COMPREHEN METABOLIC PANEL: CPT | Performed by: STUDENT IN AN ORGANIZED HEALTH CARE EDUCATION/TRAINING PROGRAM

## 2025-07-15 PROCEDURE — 93010 ELECTROCARDIOGRAM REPORT: CPT | Mod: 76 | Performed by: INTERNAL MEDICINE

## 2025-07-15 PROCEDURE — 36415 COLL VENOUS BLD VENIPUNCTURE: CPT | Performed by: INTERNAL MEDICINE

## 2025-07-15 PROCEDURE — 93005 ELECTROCARDIOGRAM TRACING: CPT

## 2025-07-15 PROCEDURE — 85045 AUTOMATED RETICULOCYTE COUNT: CPT | Performed by: STUDENT IN AN ORGANIZED HEALTH CARE EDUCATION/TRAINING PROGRAM

## 2025-07-15 PROCEDURE — 36415 COLL VENOUS BLD VENIPUNCTURE: CPT | Performed by: STUDENT IN AN ORGANIZED HEALTH CARE EDUCATION/TRAINING PROGRAM

## 2025-07-15 PROCEDURE — 99222 1ST HOSP IP/OBS MODERATE 55: CPT | Performed by: STUDENT IN AN ORGANIZED HEALTH CARE EDUCATION/TRAINING PROGRAM

## 2025-07-15 PROCEDURE — 83605 ASSAY OF LACTIC ACID: CPT | Performed by: INTERNAL MEDICINE

## 2025-07-15 PROCEDURE — 206N000001 HC R&B BMT UMMC

## 2025-07-15 PROCEDURE — 86140 C-REACTIVE PROTEIN: CPT | Performed by: STUDENT IN AN ORGANIZED HEALTH CARE EDUCATION/TRAINING PROGRAM

## 2025-07-15 PROCEDURE — 258N000003 HC RX IP 258 OP 636: Performed by: STUDENT IN AN ORGANIZED HEALTH CARE EDUCATION/TRAINING PROGRAM

## 2025-07-15 PROCEDURE — 99222 1ST HOSP IP/OBS MODERATE 55: CPT | Mod: FS | Performed by: INTERNAL MEDICINE

## 2025-07-15 PROCEDURE — 82728 ASSAY OF FERRITIN: CPT | Performed by: STUDENT IN AN ORGANIZED HEALTH CARE EDUCATION/TRAINING PROGRAM

## 2025-07-15 PROCEDURE — 84550 ASSAY OF BLOOD/URIC ACID: CPT | Performed by: STUDENT IN AN ORGANIZED HEALTH CARE EDUCATION/TRAINING PROGRAM

## 2025-07-15 PROCEDURE — 83615 LACTATE (LD) (LDH) ENZYME: CPT | Performed by: STUDENT IN AN ORGANIZED HEALTH CARE EDUCATION/TRAINING PROGRAM

## 2025-07-15 PROCEDURE — 84100 ASSAY OF PHOSPHORUS: CPT | Performed by: STUDENT IN AN ORGANIZED HEALTH CARE EDUCATION/TRAINING PROGRAM

## 2025-07-15 PROCEDURE — 82550 ASSAY OF CK (CPK): CPT | Performed by: STUDENT IN AN ORGANIZED HEALTH CARE EDUCATION/TRAINING PROGRAM

## 2025-07-15 PROCEDURE — 999N000127 HC STATISTIC PERIPHERAL IV START W US GUIDANCE

## 2025-07-15 PROCEDURE — 250N000013 HC RX MED GY IP 250 OP 250 PS 637: Performed by: STUDENT IN AN ORGANIZED HEALTH CARE EDUCATION/TRAINING PROGRAM

## 2025-07-15 PROCEDURE — 3E033WK INTRODUCTION OF IMMUNOSTIMULATOR INTO PERIPHERAL VEIN, PERCUTANEOUS: ICD-10-PCS | Performed by: STUDENT IN AN ORGANIZED HEALTH CARE EDUCATION/TRAINING PROGRAM

## 2025-07-15 PROCEDURE — 250N000012 HC RX MED GY IP 250 OP 636 PS 637: Performed by: STUDENT IN AN ORGANIZED HEALTH CARE EDUCATION/TRAINING PROGRAM

## 2025-07-15 PROCEDURE — 82248 BILIRUBIN DIRECT: CPT | Performed by: STUDENT IN AN ORGANIZED HEALTH CARE EDUCATION/TRAINING PROGRAM

## 2025-07-15 PROCEDURE — 300N000004 RESEARCH KIT COLLECTION: Performed by: STUDENT IN AN ORGANIZED HEALTH CARE EDUCATION/TRAINING PROGRAM

## 2025-07-15 RX ORDER — DEXAMETHASONE 4 MG/1
8 TABLET ORAL
Status: DISCONTINUED | OUTPATIENT
Start: 2025-07-15 | End: 2025-07-16 | Stop reason: HOSPADM

## 2025-07-15 RX ORDER — DIPHENHYDRAMINE HYDROCHLORIDE 50 MG/ML
50 INJECTION, SOLUTION INTRAMUSCULAR; INTRAVENOUS
Status: DISCONTINUED | OUTPATIENT
Start: 2025-07-15 | End: 2025-07-16 | Stop reason: HOSPADM

## 2025-07-15 RX ORDER — ALBUTEROL SULFATE 90 UG/1
1-2 INHALANT RESPIRATORY (INHALATION)
Status: DISCONTINUED | OUTPATIENT
Start: 2025-07-15 | End: 2025-07-16 | Stop reason: HOSPADM

## 2025-07-15 RX ORDER — ONDANSETRON 4 MG/1
4 TABLET, FILM COATED ORAL EVERY 8 HOURS PRN
Status: DISCONTINUED | OUTPATIENT
Start: 2025-07-15 | End: 2025-07-16 | Stop reason: HOSPADM

## 2025-07-15 RX ORDER — LISINOPRIL AND HYDROCHLOROTHIAZIDE 12.5; 2 MG/1; MG/1
2 TABLET ORAL DAILY
Status: DISCONTINUED | OUTPATIENT
Start: 2025-07-15 | End: 2025-07-16 | Stop reason: HOSPADM

## 2025-07-15 RX ORDER — FAMOTIDINE 20 MG/1
20 TABLET, FILM COATED ORAL 2 TIMES DAILY PRN
Qty: 60 TABLET | Refills: 1 | Status: SHIPPED | OUTPATIENT
Start: 2025-07-15

## 2025-07-15 RX ORDER — IBUPROFEN 200 MG
400 TABLET ORAL EVERY 8 HOURS PRN
Status: DISCONTINUED | OUTPATIENT
Start: 2025-07-15 | End: 2025-07-16 | Stop reason: HOSPADM

## 2025-07-15 RX ORDER — AMLODIPINE BESYLATE 10 MG/1
10 TABLET ORAL DAILY
Status: DISCONTINUED | OUTPATIENT
Start: 2025-07-15 | End: 2025-07-16 | Stop reason: HOSPADM

## 2025-07-15 RX ORDER — ACETAMINOPHEN 325 MG/1
650 TABLET ORAL EVERY 4 HOURS PRN
Status: DISCONTINUED | OUTPATIENT
Start: 2025-07-15 | End: 2025-07-16 | Stop reason: HOSPADM

## 2025-07-15 RX ORDER — FAMOTIDINE 20 MG/1
20 TABLET, FILM COATED ORAL EVERY 12 HOURS PRN
Status: DISCONTINUED | OUTPATIENT
Start: 2025-07-15 | End: 2025-07-16 | Stop reason: HOSPADM

## 2025-07-15 RX ORDER — METHOCARBAMOL 750 MG/1
750 TABLET, FILM COATED ORAL 4 TIMES DAILY PRN
Qty: 120 TABLET | Refills: 3 | Status: SHIPPED | OUTPATIENT
Start: 2025-07-15

## 2025-07-15 RX ORDER — EPINEPHRINE 1 MG/ML
0.3 INJECTION, SOLUTION, CONCENTRATE INTRAVENOUS EVERY 5 MIN PRN
Status: DISCONTINUED | OUTPATIENT
Start: 2025-07-15 | End: 2025-07-16 | Stop reason: HOSPADM

## 2025-07-15 RX ORDER — LIDOCAINE 40 MG/G
CREAM TOPICAL
Status: DISCONTINUED | OUTPATIENT
Start: 2025-07-15 | End: 2025-07-15

## 2025-07-15 RX ORDER — ACETAMINOPHEN 500 MG
1000 TABLET ORAL EVERY 6 HOURS
Status: DISCONTINUED | OUTPATIENT
Start: 2025-07-15 | End: 2025-07-16 | Stop reason: HOSPADM

## 2025-07-15 RX ORDER — ONDANSETRON 2 MG/ML
4 INJECTION INTRAMUSCULAR; INTRAVENOUS EVERY 8 HOURS PRN
Status: DISCONTINUED | OUTPATIENT
Start: 2025-07-15 | End: 2025-07-16 | Stop reason: HOSPADM

## 2025-07-15 RX ORDER — AMOXICILLIN 250 MG
2 CAPSULE ORAL 2 TIMES DAILY PRN
Status: DISCONTINUED | OUTPATIENT
Start: 2025-07-15 | End: 2025-07-16 | Stop reason: HOSPADM

## 2025-07-15 RX ORDER — DIPHENHYDRAMINE HYDROCHLORIDE 50 MG/ML
25 INJECTION, SOLUTION INTRAMUSCULAR; INTRAVENOUS
Status: DISCONTINUED | OUTPATIENT
Start: 2025-07-15 | End: 2025-07-16 | Stop reason: HOSPADM

## 2025-07-15 RX ORDER — ALBUTEROL SULFATE 0.83 MG/ML
2.5 SOLUTION RESPIRATORY (INHALATION)
Status: DISCONTINUED | OUTPATIENT
Start: 2025-07-15 | End: 2025-07-16 | Stop reason: HOSPADM

## 2025-07-15 RX ORDER — MEPERIDINE HYDROCHLORIDE 25 MG/ML
25 INJECTION INTRAMUSCULAR; INTRAVENOUS; SUBCUTANEOUS
Status: DISCONTINUED | OUTPATIENT
Start: 2025-07-15 | End: 2025-07-16 | Stop reason: HOSPADM

## 2025-07-15 RX ORDER — IBUPROFEN 200 MG
400 TABLET ORAL EVERY 6 HOURS PRN
Qty: 200 TABLET | Refills: 3 | Status: SHIPPED | OUTPATIENT
Start: 2025-07-15 | End: 2025-10-23

## 2025-07-15 RX ORDER — ATORVASTATIN CALCIUM 20 MG/1
20 TABLET, FILM COATED ORAL DAILY
Status: DISCONTINUED | OUTPATIENT
Start: 2025-07-15 | End: 2025-07-16 | Stop reason: HOSPADM

## 2025-07-15 RX ORDER — ATENOLOL 25 MG/1
100 TABLET ORAL DAILY
Status: DISCONTINUED | OUTPATIENT
Start: 2025-07-15 | End: 2025-07-16 | Stop reason: HOSPADM

## 2025-07-15 RX ORDER — METHYLPREDNISOLONE SODIUM SUCCINATE 40 MG/ML
40 INJECTION INTRAMUSCULAR; INTRAVENOUS
Status: DISCONTINUED | OUTPATIENT
Start: 2025-07-15 | End: 2025-07-16 | Stop reason: HOSPADM

## 2025-07-15 RX ORDER — DEXAMETHASONE 4 MG/1
8 TABLET ORAL ONCE
Status: COMPLETED | OUTPATIENT
Start: 2025-07-15 | End: 2025-07-15

## 2025-07-15 RX ORDER — PROCHLORPERAZINE MALEATE 5 MG/1
5 TABLET ORAL EVERY 6 HOURS PRN
Status: DISCONTINUED | OUTPATIENT
Start: 2025-07-15 | End: 2025-07-16 | Stop reason: HOSPADM

## 2025-07-15 RX ORDER — AMOXICILLIN 250 MG
1 CAPSULE ORAL 2 TIMES DAILY PRN
Status: DISCONTINUED | OUTPATIENT
Start: 2025-07-15 | End: 2025-07-16 | Stop reason: HOSPADM

## 2025-07-15 RX ORDER — POLYETHYLENE GLYCOL 3350 17 G/17G
17 POWDER, FOR SOLUTION ORAL 2 TIMES DAILY PRN
Status: DISCONTINUED | OUTPATIENT
Start: 2025-07-15 | End: 2025-07-16 | Stop reason: HOSPADM

## 2025-07-15 RX ADMIN — DEXAMETHASONE 8 MG: 4 TABLET ORAL at 12:02

## 2025-07-15 RX ADMIN — ACETAMINOPHEN 1000 MG: 500 TABLET ORAL at 23:22

## 2025-07-15 RX ADMIN — ACETAMINOPHEN 1000 MG: 500 TABLET ORAL at 17:57

## 2025-07-15 RX ADMIN — SODIUM CHLORIDE 1000 ML: 9 INJECTION, SOLUTION INTRAVENOUS at 11:07

## 2025-07-15 RX ADMIN — ACETAMINOPHEN 1000 MG: 500 TABLET ORAL at 12:01

## 2025-07-15 ASSESSMENT — ACTIVITIES OF DAILY LIVING (ADL)
ADLS_ACUITY_SCORE: 23
ADLS_ACUITY_SCORE: 46
ADLS_ACUITY_SCORE: 23

## 2025-07-15 NOTE — PROGRESS NOTES
[id number]: Study Visit Note   Subject name: Dion Bowman     Visit: C1D1    Did the study visit occur within the appropriate window allowed by the protocol? yes    Since the last study visit, He has been doing well. Pt met with his primary oncologist, Dr Haywood and PI, Dr Whelan this am in addition to admitting physician and team. No changes to pt's baseline. No new concerns, medications or complaints at this time.     I have personally interviewed Dion Bowman and reviewed his medical record for adverse events and concomitant medications and these have been recorded on the corresponding logs in Dion Bowman's research file.     Dion Bowman was given the opportunity to ask any trial related questions.  Please see provider progress note for physical exam and other clinical information. Labs were reviewed - any significant lab values were addressed and reviewed.    Ailyn Scott RN       I did the following education from the protocol for both the caregiver, Celi spouse and patient:      subject on signs and symptoms of CRS.  Instruct the subject to contact site or go to the emergency room if the subject experiences fever  100.4F/38.0C) or any other  symptoms of CRS.  Remind the subject of the importance of having someone (caregiver) with them who is well informed about their treatment plan  and what to expect after the subject is discharged.  Instruct the subject that they are required to stay within 1 hour of a hospital facility for a minimum of 24 hours after EOI.  Advise the subject to remain well hydrated; 11 cups or 2.5 L of daily fluid consumption is recommended.      [id number]: Re-Consent Note     New information has been added to the consent form. This was explained to the patient, and all his questions were answered. The patient verbalized understanding and would like to continue participation in the trial. The patient was provided with a signed copy of the IRB-approved consent form.    Consent Version  Date: [id number]  Consent obtained by: Ailyn Scott RN     Date: 15JUL2025    Ailyn Scott RN      Form 503.00.02 (Version 1)     Effective date: 01JUL2018     Next Review Date: 01JUL2020

## 2025-07-16 ENCOUNTER — ALLIED HEALTH/NURSE VISIT (OUTPATIENT)
Dept: ONCOLOGY | Facility: CLINIC | Age: 70
End: 2025-07-16
Payer: COMMERCIAL

## 2025-07-16 VITALS
TEMPERATURE: 97.7 F | RESPIRATION RATE: 18 BRPM | HEART RATE: 67 BPM | OXYGEN SATURATION: 100 % | BODY MASS INDEX: 26.02 KG/M2 | DIASTOLIC BLOOD PRESSURE: 64 MMHG | WEIGHT: 185.9 LBS | HEIGHT: 71 IN | SYSTOLIC BLOOD PRESSURE: 124 MMHG

## 2025-07-16 DIAGNOSIS — C61 PROSTATE CANCER (H): Primary | ICD-10-CM

## 2025-07-16 DIAGNOSIS — R97.21 RISING PSA FOLLOWING TREATMENT FOR MALIGNANT NEOPLASM OF PROSTATE: ICD-10-CM

## 2025-07-16 LAB
ALBUMIN SERPL BCG-MCNC: 4.1 G/DL (ref 3.5–5.2)
ALP SERPL-CCNC: 67 U/L (ref 40–150)
ALT SERPL W P-5'-P-CCNC: 16 U/L (ref 0–70)
ANION GAP SERPL CALCULATED.3IONS-SCNC: 12 MMOL/L (ref 7–15)
AST SERPL W P-5'-P-CCNC: 31 U/L (ref 0–45)
ATRIAL RATE - MUSE: 59 BPM
ATRIAL RATE - MUSE: 60 BPM
BASOPHILS # BLD AUTO: 0 10E3/UL (ref 0–0.2)
BASOPHILS NFR BLD AUTO: 0 %
BILIRUB SERPL-MCNC: 0.5 MG/DL
BUN SERPL-MCNC: 15.9 MG/DL (ref 8–23)
CALCIUM SERPL-MCNC: 9.1 MG/DL (ref 8.8–10.4)
CHLORIDE SERPL-SCNC: 98 MMOL/L (ref 98–107)
CREAT SERPL-MCNC: 0.86 MG/DL (ref 0.67–1.17)
DIASTOLIC BLOOD PRESSURE - MUSE: NORMAL MMHG
DIASTOLIC BLOOD PRESSURE - MUSE: NORMAL MMHG
EGFRCR SERPLBLD CKD-EPI 2021: >90 ML/MIN/1.73M2
EOSINOPHIL # BLD AUTO: 0 10E3/UL (ref 0–0.7)
EOSINOPHIL NFR BLD AUTO: 0 %
ERYTHROCYTE [DISTWIDTH] IN BLOOD BY AUTOMATED COUNT: 12.5 % (ref 10–15)
GLUCOSE SERPL-MCNC: 154 MG/DL (ref 70–99)
HCO3 SERPL-SCNC: 21 MMOL/L (ref 22–29)
HCT VFR BLD AUTO: 37.2 % (ref 40–53)
HGB BLD-MCNC: 13.5 G/DL (ref 13.3–17.7)
IMM GRANULOCYTES # BLD: 0.1 10E3/UL
IMM GRANULOCYTES NFR BLD: 1 %
INTERPRETATION ECG - MUSE: NORMAL
INTERPRETATION ECG - MUSE: NORMAL
LYMPHOCYTES # BLD AUTO: 0.7 10E3/UL (ref 0.8–5.3)
LYMPHOCYTES NFR BLD AUTO: 5 %
MAGNESIUM SERPL-MCNC: 2.1 MG/DL (ref 1.7–2.3)
MCH RBC QN AUTO: 33.1 PG (ref 26.5–33)
MCHC RBC AUTO-ENTMCNC: 36.3 G/DL (ref 31.5–36.5)
MCV RBC AUTO: 91 FL (ref 78–100)
MONOCYTES # BLD AUTO: 0.6 10E3/UL (ref 0–1.3)
MONOCYTES NFR BLD AUTO: 4 %
NEUTROPHILS # BLD AUTO: 13.3 10E3/UL (ref 1.6–8.3)
NEUTROPHILS NFR BLD AUTO: 90 %
NRBC # BLD AUTO: 0 10E3/UL
NRBC BLD AUTO-RTO: 0 /100
P AXIS - MUSE: 56 DEGREES
P AXIS - MUSE: 67 DEGREES
PHOSPHATE SERPL-MCNC: 3.7 MG/DL (ref 2.5–4.5)
PLATELET # BLD AUTO: 290 10E3/UL (ref 150–450)
POTASSIUM SERPL-SCNC: 4 MMOL/L (ref 3.4–5.3)
PR INTERVAL - MUSE: 166 MS
PR INTERVAL - MUSE: 172 MS
PROT SERPL-MCNC: 6.4 G/DL (ref 6.4–8.3)
QRS DURATION - MUSE: 92 MS
QRS DURATION - MUSE: 94 MS
QT - MUSE: 422 MS
QT - MUSE: 444 MS
QTC - MUSE: 422 MS
QTC - MUSE: 439 MS
R AXIS - MUSE: -3 DEGREES
R AXIS - MUSE: -5 DEGREES
RBC # BLD AUTO: 4.08 10E6/UL (ref 4.4–5.9)
SODIUM SERPL-SCNC: 131 MMOL/L (ref 135–145)
SYSTOLIC BLOOD PRESSURE - MUSE: NORMAL MMHG
SYSTOLIC BLOOD PRESSURE - MUSE: NORMAL MMHG
T AXIS - MUSE: 19 DEGREES
T AXIS - MUSE: 42 DEGREES
VENTRICULAR RATE- MUSE: 59 BPM
VENTRICULAR RATE- MUSE: 60 BPM
WBC # BLD AUTO: 14.8 10E3/UL (ref 4–11)

## 2025-07-16 PROCEDURE — 250N000013 HC RX MED GY IP 250 OP 250 PS 637: Performed by: STUDENT IN AN ORGANIZED HEALTH CARE EDUCATION/TRAINING PROGRAM

## 2025-07-16 PROCEDURE — 99239 HOSP IP/OBS DSCHRG MGMT >30: CPT | Mod: FS | Performed by: INTERNAL MEDICINE

## 2025-07-16 PROCEDURE — 85004 AUTOMATED DIFF WBC COUNT: CPT | Performed by: PHYSICIAN ASSISTANT

## 2025-07-16 PROCEDURE — 36415 COLL VENOUS BLD VENIPUNCTURE: CPT | Performed by: PHYSICIAN ASSISTANT

## 2025-07-16 PROCEDURE — 84100 ASSAY OF PHOSPHORUS: CPT | Performed by: PHYSICIAN ASSISTANT

## 2025-07-16 PROCEDURE — 82947 ASSAY GLUCOSE BLOOD QUANT: CPT | Performed by: PHYSICIAN ASSISTANT

## 2025-07-16 PROCEDURE — 300N000004 RESEARCH KIT COLLECTION: Performed by: INTERNAL MEDICINE

## 2025-07-16 PROCEDURE — 83735 ASSAY OF MAGNESIUM: CPT | Performed by: PHYSICIAN ASSISTANT

## 2025-07-16 RX ADMIN — ACETAMINOPHEN 1000 MG: 500 TABLET ORAL at 05:37

## 2025-07-16 ASSESSMENT — ACTIVITIES OF DAILY LIVING (ADL)
ADLS_ACUITY_SCORE: 23

## 2025-07-16 NOTE — PROGRESS NOTES
[id number]: Study Visit Note   Subject name: Dion Bowman     Visit: C1D2    Did the study visit occur within the appropriate window allowed by the protocol? yes    Since the last study visit, He has been doing well. Pt is somewhat tired from being in the hospital but does not feel any other post infusion like symptoms, no fever, no aches. Pt was noted to have some redness on his chest, this minimal rash was assessed prior to discharge. Pt will call as needed and is aware that he will take his night before pre-medication (dexamethasone 8mg) on 7/23 prior to C1D8 at [facility] on 7/24.    I have personally interviewed Dion Bowman and reviewed his medical record for adverse events and concomitant medications and these have been recorded on the corresponding logs in Dion Bowman's research file.     Dion Bowman was given the opportunity to ask any trial related questions.  Please see provider progress note for physical exam and other clinical information. Labs were reviewed - any significant lab values were addressed and reviewed.    Ailyn Scott RN

## 2025-07-16 NOTE — PROGRESS NOTES
Patient provided study wallet card and instructed to keep with him at all times, and show to any outside providers that he may see.    Patient also reminded of the signs and symptoms ort Cytokine Release Syndrome. Patient instructed to contact the study team if he experiences any of those.    Patient also reported a rash. Rash contained to Chest/Sternum area ~ 3% BSA. Denies anyt itching.    Patient denied any fevers, chills, nausea, or headache overnight.    Juliet Miranda  Clinical research Coordinator  [facility] [place]  [email or web address]  [phone]

## 2025-07-17 ENCOUNTER — PATIENT OUTREACH (OUTPATIENT)
Dept: FAMILY MEDICINE | Facility: CLINIC | Age: 70
End: 2025-07-17
Payer: COMMERCIAL

## 2025-07-17 NOTE — TELEPHONE ENCOUNTER
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 (Red Flag symptoms escalate to triage hotline per guidelines): Unchanged  Do you know how to contact your clinic care team if you have future questions or changes to your health status? : Yes  Does the patient have their discharge instructions? : Yes  Does the patient have questions regarding their discharge instructions? : No  Were you started on any new medications or were there changes to any of your previous medications? : Yes  Does the patient have all of their medications?: Yes  Do you have questions regarding any of your medications? : No  Do you have all of your needed medical supplies or equipment (DME)?  (i.e. oxygen tank, CPAP, cane, etc.): Yes    Follow up Plan     Discharge Follow-Up  Discharge follow up appointment scheduled in alignment with recommended follow up timeframe or Transitions of Risk Category? (Low = within 30 days; Moderate= within 14 days; High= within 7 days): Yes  Discharge Follow Up Appointment Date: 07/24/25  Discharge Follow Up Appointment Scheduled with?: Specialty Care Provider (Research team)    Future Appointments   Date Time Provider Department Center   7/24/2025  7:30 AM Surjit Whelan MD [facility]       Outpatient Plan as outlined on " AVS reviewed with patient.    For any urgent concerns, please contact our 24 hour nurse triage line: [phone] ([phone])       Ken Awan RN

## 2025-07-24 ENCOUNTER — HOSPITAL ENCOUNTER (OUTPATIENT)
Dept: RESEARCH | Facility: CLINIC | Age: 70
Discharge: HOME | End: 2025-07-24
Attending: STUDENT IN AN ORGANIZED HEALTH CARE EDUCATION/TRAINING PROGRAM
Payer: COMMERCIAL

## 2025-07-24 VITALS
SYSTOLIC BLOOD PRESSURE: 131 MMHG | DIASTOLIC BLOOD PRESSURE: 70 MMHG | WEIGHT: 183.2 LBS | OXYGEN SATURATION: 96 % | TEMPERATURE: 97.9 F | RESPIRATION RATE: 16 BRPM | BODY MASS INDEX: 25.37 KG/M2 | HEART RATE: 70 BPM

## 2025-07-24 DIAGNOSIS — R97.21 RISING PSA FOLLOWING TREATMENT FOR MALIGNANT NEOPLASM OF PROSTATE: ICD-10-CM

## 2025-07-24 DIAGNOSIS — Z19.1 HORMONE SENSITIVE PROSTATE CANCER (H): Primary | ICD-10-CM

## 2025-07-24 DIAGNOSIS — C61 HORMONE SENSITIVE PROSTATE CANCER (H): Primary | ICD-10-CM

## 2025-07-24 LAB
ALBUMIN SERPL BCG-MCNC: 4.4 G/DL (ref 3.5–5.2)
ALP SERPL-CCNC: 81 U/L (ref 40–150)
ALT SERPL W P-5'-P-CCNC: 25 U/L (ref 0–70)
ANION GAP SERPL CALCULATED.3IONS-SCNC: 12 MMOL/L (ref 7–15)
AST SERPL W P-5'-P-CCNC: 25 U/L (ref 0–45)
BASOPHILS # BLD AUTO: 0 10E3/UL (ref 0–0.2)
BASOPHILS NFR BLD AUTO: 0 %
BILIRUB SERPL-MCNC: 0.5 MG/DL
BILIRUBIN DIRECT (ROCHE PRO & PURE): 0.23 MG/DL (ref 0–0.45)
BUN SERPL-MCNC: 17.3 MG/DL (ref 8–23)
CALCIUM SERPL-MCNC: 9.4 MG/DL (ref 8.8–10.4)
CHLORIDE SERPL-SCNC: 99 MMOL/L (ref 98–107)
CK SERPL-CCNC: 110 U/L (ref 39–308)
CREAT SERPL-MCNC: 0.93 MG/DL (ref 0.67–1.17)
CRP SERPL-MCNC: <3 MG/L
EGFRCR SERPLBLD CKD-EPI 2021: 89 ML/MIN/1.73M2
EOSINOPHIL # BLD AUTO: 0 10E3/UL (ref 0–0.7)
EOSINOPHIL NFR BLD AUTO: 0 %
ERYTHROCYTE [DISTWIDTH] IN BLOOD BY AUTOMATED COUNT: 12.4 % (ref 10–15)
FERRITIN SERPL-MCNC: 551 NG/ML (ref 31–409)
GLUCOSE SERPL-MCNC: 266 MG/DL (ref 70–99)
HCO3 SERPL-SCNC: 22 MMOL/L (ref 22–29)
HCT VFR BLD AUTO: 41.3 % (ref 40–53)
HGB BLD-MCNC: 14.6 G/DL (ref 13.3–17.7)
IMM GRANULOCYTES # BLD: 0.1 10E3/UL
IMM GRANULOCYTES NFR BLD: 1 %
LYMPHOCYTES # BLD AUTO: 0.5 10E3/UL (ref 0.8–5.3)
LYMPHOCYTES NFR BLD AUTO: 5 %
MAGNESIUM SERPL-MCNC: 2.1 MG/DL (ref 1.7–2.3)
MCH RBC QN AUTO: 32.7 PG (ref 26.5–33)
MCHC RBC AUTO-ENTMCNC: 35.4 G/DL (ref 31.5–36.5)
MCV RBC AUTO: 93 FL (ref 78–100)
MONOCYTES # BLD AUTO: 0.1 10E3/UL (ref 0–1.3)
MONOCYTES NFR BLD AUTO: 1 %
NEUTROPHILS # BLD AUTO: 9.4 10E3/UL (ref 1.6–8.3)
NEUTROPHILS NFR BLD AUTO: 93 %
NRBC # BLD AUTO: 0 10E3/UL
NRBC BLD AUTO-RTO: 0 /100
PHOSPHATE SERPL-MCNC: 3 MG/DL (ref 2.5–4.5)
PLATELET # BLD AUTO: 294 10E3/UL (ref 150–450)
POTASSIUM SERPL-SCNC: 4.6 MMOL/L (ref 3.4–5.3)
PROT SERPL-MCNC: 7 G/DL (ref 6.4–8.3)
RBC # BLD AUTO: 4.46 10E6/UL (ref 4.4–5.9)
RETICS # AUTO: 0.08 10E6/UL (ref 0.03–0.1)
RETICS/RBC NFR AUTO: 1.8 % (ref 0.5–2)
SODIUM SERPL-SCNC: 133 MMOL/L (ref 135–145)
URATE SERPL-MCNC: 4.8 MG/DL (ref 3.4–7)
WBC # BLD AUTO: 10.1 10E3/UL (ref 4–11)

## 2025-07-24 PROCEDURE — 86140 C-REACTIVE PROTEIN: CPT | Performed by: STUDENT IN AN ORGANIZED HEALTH CARE EDUCATION/TRAINING PROGRAM

## 2025-07-24 PROCEDURE — 510N000009 HC RESEARCH FACILITY, PER 15 MIN

## 2025-07-24 PROCEDURE — 84155 ASSAY OF PROTEIN SERUM: CPT | Performed by: STUDENT IN AN ORGANIZED HEALTH CARE EDUCATION/TRAINING PROGRAM

## 2025-07-24 PROCEDURE — 85045 AUTOMATED RETICULOCYTE COUNT: CPT | Performed by: STUDENT IN AN ORGANIZED HEALTH CARE EDUCATION/TRAINING PROGRAM

## 2025-07-24 PROCEDURE — 510N000023 HC CRU B&I PATIENT CARE, PER 15 MIN

## 2025-07-24 PROCEDURE — 300N000007 HC RESEARCH B&I SPECIMEN PROCESSING, SIMPLE

## 2025-07-24 PROCEDURE — 84550 ASSAY OF BLOOD/URIC ACID: CPT | Performed by: STUDENT IN AN ORGANIZED HEALTH CARE EDUCATION/TRAINING PROGRAM

## 2025-07-24 PROCEDURE — 84100 ASSAY OF PHOSPHORUS: CPT | Performed by: STUDENT IN AN ORGANIZED HEALTH CARE EDUCATION/TRAINING PROGRAM

## 2025-07-24 PROCEDURE — 85025 COMPLETE CBC W/AUTO DIFF WBC: CPT | Performed by: STUDENT IN AN ORGANIZED HEALTH CARE EDUCATION/TRAINING PROGRAM

## 2025-07-24 PROCEDURE — 250N000012 HC RX MED GY IP 250 OP 636 PS 637: Performed by: STUDENT IN AN ORGANIZED HEALTH CARE EDUCATION/TRAINING PROGRAM

## 2025-07-24 PROCEDURE — 250N000013 HC RX MED GY IP 250 OP 250 PS 637: Performed by: STUDENT IN AN ORGANIZED HEALTH CARE EDUCATION/TRAINING PROGRAM

## 2025-07-24 PROCEDURE — 83735 ASSAY OF MAGNESIUM: CPT | Performed by: STUDENT IN AN ORGANIZED HEALTH CARE EDUCATION/TRAINING PROGRAM

## 2025-07-24 PROCEDURE — 82550 ASSAY OF CK (CPK): CPT | Performed by: STUDENT IN AN ORGANIZED HEALTH CARE EDUCATION/TRAINING PROGRAM

## 2025-07-24 PROCEDURE — 258N000003 HC RX IP 258 OP 636: Performed by: STUDENT IN AN ORGANIZED HEALTH CARE EDUCATION/TRAINING PROGRAM

## 2025-07-24 PROCEDURE — 82728 ASSAY OF FERRITIN: CPT | Performed by: STUDENT IN AN ORGANIZED HEALTH CARE EDUCATION/TRAINING PROGRAM

## 2025-07-24 PROCEDURE — 82248 BILIRUBIN DIRECT: CPT | Performed by: STUDENT IN AN ORGANIZED HEALTH CARE EDUCATION/TRAINING PROGRAM

## 2025-07-24 PROCEDURE — 36415 COLL VENOUS BLD VENIPUNCTURE: CPT | Performed by: STUDENT IN AN ORGANIZED HEALTH CARE EDUCATION/TRAINING PROGRAM

## 2025-07-24 RX ORDER — ACETAMINOPHEN 325 MG/1
650 TABLET ORAL EVERY 6 HOURS
Status: CANCELLED | OUTPATIENT
Start: 2025-07-24

## 2025-07-24 RX ORDER — IBUPROFEN 200 MG
400 TABLET ORAL EVERY 8 HOURS PRN
Status: ACTIVE | OUTPATIENT
Start: 2025-07-24

## 2025-07-24 RX ORDER — EPINEPHRINE 0.3 MG/.3ML
0.3 INJECTION SUBCUTANEOUS EVERY 5 MIN PRN
Status: ACTIVE | OUTPATIENT
Start: 2025-07-24

## 2025-07-24 RX ORDER — DEXAMETHASONE 4 MG/1
8 TABLET ORAL
Status: ACTIVE | OUTPATIENT
Start: 2025-07-24

## 2025-07-24 RX ORDER — ALBUTEROL SULFATE 0.83 MG/ML
2.5 SOLUTION RESPIRATORY (INHALATION)
Status: ACTIVE | OUTPATIENT
Start: 2025-07-24

## 2025-07-24 RX ORDER — METHYLPREDNISOLONE SODIUM SUCCINATE 40 MG/ML
40 INJECTION INTRAMUSCULAR; INTRAVENOUS
Status: ACTIVE | OUTPATIENT
Start: 2025-07-24

## 2025-07-24 RX ORDER — DEXAMETHASONE 4 MG/1
8 TABLET ORAL ONCE
Status: COMPLETED | OUTPATIENT
Start: 2025-07-24 | End: 2025-07-24

## 2025-07-24 RX ORDER — ALBUTEROL SULFATE 90 UG/1
1-2 INHALANT RESPIRATORY (INHALATION)
Status: ACTIVE | OUTPATIENT
Start: 2025-07-24

## 2025-07-24 RX ORDER — FAMOTIDINE 20 MG/1
20 TABLET, FILM COATED ORAL EVERY 12 HOURS PRN
Status: ACTIVE | OUTPATIENT
Start: 2025-07-24

## 2025-07-24 RX ORDER — MEPERIDINE HYDROCHLORIDE 25 MG/ML
25 INJECTION INTRAMUSCULAR; INTRAVENOUS; SUBCUTANEOUS
Status: ACTIVE | OUTPATIENT
Start: 2025-07-24

## 2025-07-24 RX ORDER — DIPHENHYDRAMINE HYDROCHLORIDE 50 MG/ML
50 INJECTION, SOLUTION INTRAMUSCULAR; INTRAVENOUS
Status: ACTIVE | OUTPATIENT
Start: 2025-07-24

## 2025-07-24 RX ORDER — ACETAMINOPHEN 325 MG/1
650 TABLET ORAL EVERY 4 HOURS PRN
Status: ACTIVE | OUTPATIENT
Start: 2025-07-24

## 2025-07-24 RX ORDER — DIPHENHYDRAMINE HYDROCHLORIDE 50 MG/ML
25 INJECTION, SOLUTION INTRAMUSCULAR; INTRAVENOUS
Status: ACTIVE | OUTPATIENT
Start: 2025-07-24

## 2025-07-24 RX ORDER — ACETAMINOPHEN 325 MG/1
650 TABLET ORAL EVERY 6 HOURS
Status: DISPENSED | OUTPATIENT
Start: 2025-07-24 | End: 2025-07-25

## 2025-07-24 RX ADMIN — ACETAMINOPHEN 650 MG: 325 TABLET ORAL at 15:28

## 2025-07-24 RX ADMIN — SODIUM CHLORIDE 1000 ML: 0.9 INJECTION, SOLUTION INTRAVENOUS at 09:34

## 2025-07-24 RX ADMIN — DEXAMETHASONE 8 MG: 4 TABLET ORAL at 09:32

## 2025-07-24 RX ADMIN — ACETAMINOPHEN 650 MG: 325 TABLET ORAL at 09:32

## 2025-07-24 NOTE — PATIENT INSTRUCTIONS
Dion,   Here is what we discussed today:     Please continue to take tylenol 500mg every 6 hours for 4 doses AFTER you leave the CRU.   You should then take 1000mg of tylenol  three times a day if you develop any aches or pains.   Starting with today's dose, add ibuprofen 400mg (2 tablets) three times a day.   If you start taking ibuprofen, start taking famotidine 20mg twice a day also to protect the lining of your stomach.   If you develop muscle stiffness or spasms, you can start taking methacarbamol up to 4 times a day.   Please check your temperature at least twice a day and log them. Please check your blood pressure at the same time and record it.   If you develop a fever of 100.4F/38C or higher, please call me at any time of day (including overnight or weekends) at [phone]. If I do not  right away (especially overnight) call back in a few minutes.   You should also call if your blood pressure is less than 110 for the top number, you develop a rash, you have a sore throat, mouth/tongue/throat swelling, or you develop swelling anywhere.   Please remember to take two x4mg tablets of dexamethasone around 9PM the night before your next infusion.  Once you have reached the target dose of [id number] and we see how you tolerate it, I will let you know how much dexamethasone to take the night before each infusion.

## 2025-07-24 NOTE — PROGRESS NOTES
[id number]: Study Visit Note   Subject name: Dion MONTERO Bowman     Visit: C1D1    Did the study visit occur within the appropriate window allowed by the protocol? yes    Performance Status Scale:  E[employer] Cooperative Oncology Group (ECOG) Performance Status 0     Eastern Cooperative Oncology Group (ECOG) Performance Status  GRADE DESCRIPTION  0 Fully active, able to carry on all pre-disease performance without restriction  1 Restricted in physically strenuous activity but ambulatory and able to carry out work of a light  2 Ambulatory and capable of all selfcare but unable to carry out any work activities; up and about  more than 50% of waking hours  3 Capable of only limited selfcare; confined to bed or chair more than 50% of waking hours  4 Completely disabled; cannot carry on any selfcare; totally confined to bed or chair  5 Dead       Ailyn Scott RN

## 2025-07-24 NOTE — PROGRESS NOTES
Clinical Research Unit Nursing Note:  Dion Bowman presents today for Cycle 1 Day 8, Study- AMG-509 (IDS #[id number]).     Patient seen by provider today: Yes: Dr. Whelan                             present during visit today: Not Applicable.     Note:   Cycle 1 Day 8. Pre-infusion labs drawn, okay to proceed.Tolerated previous infusion without incident.    Intravenous Access:  2 peripheral IV(s) placed.    Pre-medications:  Dexamethasone PO 8 mg(home dose) taken @1930 per patient.  NS 1 L IV Started: @0935 over 4 hours  Acetaminophen  mg @ 0933  Dexamethasone PO 8 mg @ 0933    Study - AMG-509 (IDS# [id number]) intravenous infusion:   Chemo Double Check  Protocol verified?: Yes  Height and weight verified?: Yes  BSA confirmed?: N/A  Meets treatment parameters?: Yes  Initial RN verification: Ingrid Cespedes RN  Second RN verification: Nona Baez RN    Dose: 0.3 mg  Infusion started at 1003. End of infusion (with 20 ml NS flush) ended at: 1111    Post Infusion Assessment:  Vitals and labs obtained per protocol. Observed for 6 hrs post infusion. Patient tolerated infusion without incident. Patient afebrile without signs of neurotoxicity. Denies new symptoms. No new muscle pain, muscle weakness, joint pain, or soft tissue swelling.    Patient Vitals for the past 24 hrs:   BP Temp Temp src Pulse Resp SpO2 Weight   07/24/25 1702 131/70 97.9  F (36.6  C) Oral 70 16 96 % --   07/24/25 1309 130/64 97.7  F (36.5  C) -- 69 11 96 % --   07/24/25 1111 124/71 97.5  F (36.4  C) Oral 63 13 100 % --   07/24/25 0955 128/70 97.7  F (36.5  C) Oral 59 12 98 % --   07/24/25 0731 131/75 97.3  F (36.3  C) Oral 66 12 98 % --   07/24/25 0718 -- -- -- -- -- -- 83.1 kg (183 lb 3.2 oz)       CRS Grading ~2 Hours Post EOI 6 Hours Post EOI   Time of Assessment: 1309 1705   Fever: </= 100.4 </= 100.4   Blood pressure: SBP >/= 90 (not hypotensive) SBP >/= 90 (not hypotensive)   Oxygen saturation: >/= 90% on room air (not hypoxic) >/= 90% on  "room air (not hypoxic)   Current CRS stgstrstastdstest:st st1st 0     ICE Score ~2 Hours Post EOI 6 Hours Post EOI   Time of Assessment: 1070 9834   There are signs of headache, muscle spasms or rigidity, or vision changes. No. No.   Orientation: Orientation to year, month, city, hospital: 4 points (1 point each) 4 4   Identify: Name 3 objects that provider points to (e.g. clock, pen, button): 3 points (1 point each) 3 3   Following Commands: (e.g. show me two fingers or close your eyes and stick out your tongue): 1 point 1 1   Writing: Ability to write a standard sentence (see writing page \"Our national bird is the bald eagle.\"): 1 point N/A - not needed per CRC NA - not needed per CRC   Attention: Count backwards from 100 by 10s: 1 point   1 1   Total: Max 10 9 9     Post Infusion Medications:  Acetaminophen  mg @ 1530    Discharge Plan:   Ok to discharge. Access discontinued per protocol.   Patient instructed to remain within 1 hour hospital proximity for 24 hours and take tylenol every 6 hours for 24 hours. Discharge AVS provided to patient.    Patient discharged in stable condition.  Departure Mode: Ambulatory.    Ingrid Cespedes RN on 7/24/2025 at 7:57 AM    Nona Baez RN on 7/24/2025 at 1708    Table 1: CRS Grading per ASTCT (https://doi.org/10.1016/j.bbmt.[email or web address])        "

## 2025-07-24 NOTE — PROGRESS NOTES
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  Patient presents with an elevated PSA of 5.59 ng/mL (05/12/2017), PSA 9.32 ng/mL (10/14/2019). MRI prostate 12/23/2019, revealed prostate measuring 2.4 x 4.6 x 4.1 cm with estimated volume of 23 g.  There was an 11.3 x 9.2 mm, lobulated T2 hypointense focus in the 5:00 position of the left base and mid gland peripheral zone with markedly hyperintense diffusion restriction and markedly hypointense ADC, with 6-15 mm capsular abutment with focal bulging and capsular irregularity (PI-RADS 5) designated as lesion 1.  There was an 8.4 x 8.8 mm moderately T2 hypointense lesion in the 7:00 position of the right base and mid gland peripheral zone, with moderately hyperintense DWI and moderate hypointense ADC, with 6-15 mm capsular abutment with smooth contour (PI-RADS 4) designated as lesion 2.  Lesion 1 approaches the neurovascular bundle.  There was no seminal vesicle involvement.  There was no lymphadenopathy.  There was no suspicious osseous lesions. CT abdomen, pelvis 02/26/2020, was negative for adenopathy or metastases.  Bone scan 02/26/2020, was negative for metastases.  There was mild increased uptake in the shoulders, knees, and feet consistent with degenerative changes. UroNav MRI-ultrasound fusion-guided prostate core needle biopsy 06/17/2020, revealed a Deb 4+4=8 ductal adenocarcinoma involving 90% of the core needle biopsy samples from the left mid lateral, 15% of the biopsy sample from the left lateral apex, and mixed ductal and acinar adenocarcinoma involving 80% of the biopsy sample from the left mid gland.  There was a [place] 4+3=7 mixed ductal and acinar adenocarcinoma involving 40% of the core needle biopsy sample from the left base, and 70% of  2 of 3 biopsy samples from lesion 1 in the left base.  There was a [place] 3+4=7 acinar adenocarcinoma involving 30% of the core needle biopsy sample from the right mid lateral, and an adenocarcinoma involving 20% of the biopsy sample from the right lateral apex.  There was a [place] 3+3=6 acinar adenocarcinoma involving 30% of the core needle biopsy sample from the left apex, 20% of the biopsy sample from the right lateral base, 80% of the biopsy sample from the right base, 40% of 1 of 2 biopsy samples from lesion 2 in the right base, and an adenocarcinoma involving 10% of the biopsy sample from the right mid gland.  There was benign prostatic tissue in the core needle biopsy samples from the left lateral base, and right apex (14/17 samples involved).    Patient opted to proceed with robotic-assisted laparoscopic prostatectomy and bilateral pelvic lymph node dissection 04/29/2020, that revealed a [place] 4+3=7 adenocarcinoma, mixed acinar (80%) and ductal (20%) types, involving 20% of the total prostatic volume.  The tumor was confined to the prostate without extraprostatic extension.  There was no lymphovascular or perineural invasion.  There was no urinary bladder neck invasion and seminal vesicles were not involved.  Surgical margins were tumor free. Nine pelvic lymph nodes were negative for metastases (0/9 lymph nodes involved).  PSA was undetectable at <0.04 ng/mL (09/04/2020) with subsequent biochemical progression with PSA 0.04 ng/mL (12/18/2020), PSA 0.05 ng/mL (03/19/2021), PSA 0.14 ng/mL (06/18/2021). Patient received salvage radiation therapy to the prostate bed (6840 cGy in 38 fractions) and pelvic lymph nodes (4500 cGy in 25 fractions) from 07/14/2021 through 09/07/2021, without androgen deprivation therapy (ADT).     There was a PSA yobany of <0.04 ng/mL (12/09/2021 through 12/29/2022), with subsequent biochemical progression with PSA 0.06 ng/mL (04/11/2023), PSA 0.18 ng/mL (01/04/2024), PSA 0.34 ng/mL  (10/17/2024), PSA 0.54 ng/mL (12/19/2024). 68-Ga PSMA-11 PET/CT scan 10/31/2024, revealed postsurgical changes related to prior prostatectomy.  There was no PSMA uptake in the prostatectomy bed.  There were no PSMA-avid metastases.  There was subtle focal PSMA uptake in the left posterior sixth rib without CT correlate with SUV max 1.6 and SUV mean 0.9, and an additional subtle focus of PSMA uptake in the left eighth rib without CT correlate.  Patient has mild positional urinary incontinence particularly when squatting, and nocturia 1-2 times per night.  Patient exercises regularly. There are no other new symptoms or events since the prior clinic visit (03/20/2025).     He was initiated on RCE898 per protocol on 7/15/25.     INTERVAL HISTORY:   Feeling well since hospital discharge last week for PME083 initiation.  Appetite is good.  Energy is good.  Playing [employer] ball multiple times without aches or pains.  No myalgais, arthralgias, rash, swelling or edema.  No mouth/throat soreness or tongue swelling.  No nausea or vomiting.  No fevers at home.  Overall feeling really well and offers no additional complaints or concerns.     Study Treatment History:     -7/15/25-C1D1 [id number] at 0.1mg.  Tolerated without issues.   -7/24/25-C1D8 [id number] at 0.3mg.     MEDICATIONS:   Current Outpatient Medications   Medication Sig Dispense Refill    acetaminophen (TYLENOL) 500 MG tablet Take 1 tablet (500 mg) by mouth every 6 hours. Take for the first 24 hours after discharge for all xaluritamig doses in Cycle 1. Maximum 4000 mg in 24 hours. 32 tablet 0    amLODIPine (NORVASC) 10 MG tablet Take 1 tablet (10 mg) by mouth daily. 90 tablet 3    atenolol (TENORMIN) 100 MG tablet Take 1 tablet (100 mg) by mouth daily. 90 tablet 3    atorvastatin (LIPITOR) 20 MG tablet Take 1 tablet (20 mg) by mouth daily. 90 tablet 3    dexAMETHasone (DECADRON) 4 MG tablet Take 2 tablets (8 mg) by mouth See Admin Instructions for 4 doses. Take the evening  (approximately 12-16 hours) prior to the first 3 xaluritamig doses.  Record time taken. 30 tablet 0    famotidine (PEPCID) 20 MG tablet Take 1 tablet (20 mg) by mouth 2 times daily as needed (Take if using ibuprofen). 60 tablet 1    FISH OIL 1200 MG OR CAPS Take 2 capsules by mouth every other day       ibuprofen (ADVIL/MOTRIN) 200 MG tablet Take 2 tablets (400 mg) by mouth every 6 hours as needed for pain (Muscle and joint aches). 200 tablet 3    lisinopril-hydrochlorothiazide (ZESTORETIC) 20-12.5 MG tablet TAKE 2 TABLETS BY MOUTH EVERY MORNING 180 tablet 3    methocarbamol (ROBAXIN) 750 MG tablet Take 1 tablet (750 mg) by mouth 4 times daily as needed for muscle spasms (muscle aches). 120 tablet 3    sildenafil (REVATIO) 20 MG tablet Take 1 tablet (20 mg) by mouth daily as needed (sexual activity) 30 tablet 3    VITAMIN D, CHOLECALCIFEROL, PO Take 1,000 Units by mouth daily         REVIEW OF SYSTEMS: Review of systems reviewed with the patient and otherwise negative except for those detailed above.    PHYSICAL EXAM: /75 (BP Location: Left arm, Patient Position: Sitting, Cuff Size: Adult Regular)   Pulse 66   Temp 97.3  F (36.3  C) (Oral)   Resp 12   Wt 83.1 kg (183 lb 3.2 oz)   SpO2 98%   BMI 25.37 kg/m  . Body surface area is 2.04 meters squared. ECOG performance status: 0.     Exam:  Constitutional: healthy, alert, and no distress  Head: Normocephalic. No masses, lesions, tenderness or abnormalities grossly   ENT: ENT exam normal, no erythema of OP, no tongue or oral lesions  Cardiovascular: negative, No edema or JVD. RRR, no m/r/g appreciated.   Respiratory: negative, CTAB, normal WOB on RA.   Gastrointestinal: Abdomen soft, non-tender. BS normal. No masses, organomegaly  : Deferred  Musculoskeletal: extremities normal- no gross deformities noted and normal muscle tone, normal bulk   Skin: no suspicious lesions or rashes on exposed areas of skin   Neurologic: CNII-XII intact, normal speech, moving  all 4 extremities symmetrically and spontaneously.   Psychiatric: mentation appears normal and affect normal/bright    LABORATORY REVIEWED:     7/24/25 labs  CMP with Na mildly low at 133, remaining lytes and LFTs WNL.  Ferritin eleveated to 551 from baseline of 415.  CRP pending at time of note.      CBC with WBC of 10.1, ANC 9.4, ALC 0.5, Hgb 14.6, MCV 93, and plt 294.      IMPRESSION/PLAN: Stage IIC mixed acinar and ductal adenocarcinoma of the prostate.  Prostate cancer is a Deb 4+3=7 mixed acinar and ductal adenocarcinoma confined to the prostate without extraprostatic extension, seminal vesicle involvement, or regional or distant metastases.  Patient underwent definitive surgery (04/29/2020).  PSA was undetectable postoperatively (09/04/2020) with subsequent biochemical progression with PSA 0.04 ng/mL (12/18/2020) to PSA 0.14 ng/mL (06/18/2021). Patient received salvage radiation therapy to the prostate bed and pelvic lymph nodes without concurrent ADT (from 07/14/2021 through 09/07/2021).  PSA was undetectable x1 year (from 12/09/2021 through 12/29/2022), with subsequent biochemical progression with PSA 0.06 ng/mL (04/11/2023) to PSA 0.54 ng/mL (12/19/2024). PSMA PET/CT scan (10/31/2024) was negative for evidence of recurrence or metastases.  The subtle foci of PSMA uptake in the left posterior sixth rib and left eighth rib were without CT correlate and not suspicious for metastases.  PSA doubling time is calculated at 6-7 months indicating a high risk biochemical recurrence. He was consented for the PRW038 biochemical relapse study and initiated therapy per protocol on 7/15/25.  He returned for C1D8 on 7/24/25 and labs/exam were appropriate to proceed with C1D8 dosing at 0.3mg of [id number].      -7/15/25-C1D1 [id number] at 0.1mg.  Tolerated without issues.   -7/24/25-C1D8 [id number] at 0.3mg.     PLAN:   Please continue to take tylenol 500mg every 6 hours for 4 doses AFTER you leave the CRU.   You should then take  1000mg of tylenol  three times a day if you develop any aches or pains.   Starting with today's dose, add ibuprofen 400mg (2 tablets) three times a day.   If you start taking ibuprofen, start taking famotidine 20mg twice a day also to protect the lining of your stomach.   If you develop muscle stiffness or spasms, you can start taking methacarbamol up to 4 times a day.   Please check your temperature at least twice a day and log them. Please check your blood pressure at the same time and record it.   If you develop a fever of 100.4F/38C or higher, please call me at any time of day (including overnight or weekends) at [phone]. If I do not  right away (especially overnight) call back in a few minutes.   You should also call if your blood pressure is less than 110 for the top number, you develop a rash, you have a sore throat, mouth/tongue/throat swelling, or you develop swelling anywhere.   Please remember to take two x4mg tablets of dexamethasone around 9PM the night before your next infusion.  Once you have reached the target dose of [id number] and we see how you tolerate it, I will let you know how much dexamethasone to take the night before each infusion.     A total of 75 minutes spent on the date of the encounter doing chart review, review of test results, interpretation of tests, patient visit, and documentation.  The patient was given the opportunity to ask multiple questions today, all of which were answered to their satisfaction.    The longitudinal plan of care for biochemical relapse of prostate cancer was addressed during this visit. Due to the added complexity in care, I will continue to support Dion Bowman in the subsequent management of this condition(s) and with the ongoing continuity of care of this condition(s).     Surjit Whelan MD, PhD   of Medicine   Oncology/BMT/Cellular Therapies  [

## 2025-07-29 ENCOUNTER — ALLIED HEALTH/NURSE VISIT (OUTPATIENT)
Dept: ONCOLOGY | Facility: CLINIC | Age: 70
End: 2025-07-29
Payer: COMMERCIAL

## 2025-07-29 DIAGNOSIS — C61 PROSTATE CANCER (H): Primary | ICD-10-CM

## 2025-07-31 ENCOUNTER — ALLIED HEALTH/NURSE VISIT (OUTPATIENT)
Dept: ONCOLOGY | Facility: CLINIC | Age: 70
End: 2025-07-31

## 2025-07-31 ENCOUNTER — HOSPITAL ENCOUNTER (OUTPATIENT)
Dept: RESEARCH | Facility: CLINIC | Age: 70
End: 2025-07-31
Attending: STUDENT IN AN ORGANIZED HEALTH CARE EDUCATION/TRAINING PROGRAM
Payer: COMMERCIAL

## 2025-07-31 VITALS
RESPIRATION RATE: 16 BRPM | WEIGHT: 183.86 LBS | DIASTOLIC BLOOD PRESSURE: 64 MMHG | BODY MASS INDEX: 25.46 KG/M2 | HEART RATE: 69 BPM | SYSTOLIC BLOOD PRESSURE: 121 MMHG | TEMPERATURE: 97.5 F | OXYGEN SATURATION: 100 %

## 2025-07-31 DIAGNOSIS — Z19.1 HORMONE SENSITIVE PROSTATE CANCER (H): Primary | ICD-10-CM

## 2025-07-31 DIAGNOSIS — C61 HORMONE SENSITIVE PROSTATE CANCER (H): Primary | ICD-10-CM

## 2025-07-31 DIAGNOSIS — R97.21 RISING PSA FOLLOWING TREATMENT FOR MALIGNANT NEOPLASM OF PROSTATE: ICD-10-CM

## 2025-07-31 DIAGNOSIS — C61 PROSTATE CANCER (H): Primary | ICD-10-CM

## 2025-07-31 LAB
ALBUMIN SERPL BCG-MCNC: 4.1 G/DL (ref 3.5–5.2)
ALP SERPL-CCNC: 84 U/L (ref 40–150)
ALT SERPL W P-5'-P-CCNC: 19 U/L (ref 0–70)
ANION GAP SERPL CALCULATED.3IONS-SCNC: 12 MMOL/L (ref 7–15)
AST SERPL W P-5'-P-CCNC: 21 U/L (ref 0–45)
BASOPHILS # BLD AUTO: 0 10E3/UL (ref 0–0.2)
BASOPHILS NFR BLD AUTO: 0 %
BILIRUB SERPL-MCNC: 0.5 MG/DL
BILIRUBIN DIRECT (ROCHE PRO & PURE): 0.22 MG/DL (ref 0–0.45)
BUN SERPL-MCNC: 22.2 MG/DL (ref 8–23)
CALCIUM SERPL-MCNC: 9.2 MG/DL (ref 8.8–10.4)
CHLORIDE SERPL-SCNC: 97 MMOL/L (ref 98–107)
CK SERPL-CCNC: 85 U/L (ref 39–308)
CREAT SERPL-MCNC: 0.93 MG/DL (ref 0.67–1.17)
CRP SERPL-MCNC: <3 MG/L
EGFRCR SERPLBLD CKD-EPI 2021: 89 ML/MIN/1.73M2
EOSINOPHIL # BLD AUTO: 0 10E3/UL (ref 0–0.7)
EOSINOPHIL NFR BLD AUTO: 0 %
ERYTHROCYTE [DISTWIDTH] IN BLOOD BY AUTOMATED COUNT: 12.4 % (ref 10–15)
FERRITIN SERPL-MCNC: 513 NG/ML (ref 31–409)
GLUCOSE SERPL-MCNC: 333 MG/DL (ref 70–99)
HCO3 SERPL-SCNC: 22 MMOL/L (ref 22–29)
HCT VFR BLD AUTO: 38.9 % (ref 40–53)
HGB BLD-MCNC: 13.8 G/DL (ref 13.3–17.7)
IMM GRANULOCYTES # BLD: 0.1 10E3/UL
IMM GRANULOCYTES NFR BLD: 1 %
LYMPHOCYTES # BLD AUTO: 0.2 10E3/UL (ref 0.8–5.3)
LYMPHOCYTES NFR BLD AUTO: 2 %
MAGNESIUM SERPL-MCNC: 2 MG/DL (ref 1.7–2.3)
MCH RBC QN AUTO: 32.4 PG (ref 26.5–33)
MCHC RBC AUTO-ENTMCNC: 35.5 G/DL (ref 31.5–36.5)
MCV RBC AUTO: 91 FL (ref 78–100)
MONOCYTES # BLD AUTO: 0.1 10E3/UL (ref 0–1.3)
MONOCYTES NFR BLD AUTO: 1 %
NEUTROPHILS # BLD AUTO: 10.1 10E3/UL (ref 1.6–8.3)
NEUTROPHILS NFR BLD AUTO: 96 %
NRBC # BLD AUTO: 0 10E3/UL
NRBC BLD AUTO-RTO: 0 /100
PHOSPHATE SERPL-MCNC: 3 MG/DL (ref 2.5–4.5)
PLATELET # BLD AUTO: 271 10E3/UL (ref 150–450)
POTASSIUM SERPL-SCNC: 4.4 MMOL/L (ref 3.4–5.3)
PROT SERPL-MCNC: 6.6 G/DL (ref 6.4–8.3)
RBC # BLD AUTO: 4.26 10E6/UL (ref 4.4–5.9)
RETICS # AUTO: 0.08 10E6/UL (ref 0.03–0.1)
RETICS/RBC NFR AUTO: 1.8 % (ref 0.5–2)
SODIUM SERPL-SCNC: 131 MMOL/L (ref 135–145)
URATE SERPL-MCNC: 4.4 MG/DL (ref 3.4–7)
WBC # BLD AUTO: 10.5 10E3/UL (ref 4–11)

## 2025-07-31 PROCEDURE — 85025 COMPLETE CBC W/AUTO DIFF WBC: CPT | Performed by: STUDENT IN AN ORGANIZED HEALTH CARE EDUCATION/TRAINING PROGRAM

## 2025-07-31 PROCEDURE — 250N000012 HC RX MED GY IP 250 OP 636 PS 637: Performed by: STUDENT IN AN ORGANIZED HEALTH CARE EDUCATION/TRAINING PROGRAM

## 2025-07-31 PROCEDURE — 84100 ASSAY OF PHOSPHORUS: CPT | Performed by: STUDENT IN AN ORGANIZED HEALTH CARE EDUCATION/TRAINING PROGRAM

## 2025-07-31 PROCEDURE — 250N000013 HC RX MED GY IP 250 OP 250 PS 637: Performed by: STUDENT IN AN ORGANIZED HEALTH CARE EDUCATION/TRAINING PROGRAM

## 2025-07-31 PROCEDURE — 82728 ASSAY OF FERRITIN: CPT | Performed by: STUDENT IN AN ORGANIZED HEALTH CARE EDUCATION/TRAINING PROGRAM

## 2025-07-31 PROCEDURE — 85045 AUTOMATED RETICULOCYTE COUNT: CPT | Performed by: STUDENT IN AN ORGANIZED HEALTH CARE EDUCATION/TRAINING PROGRAM

## 2025-07-31 PROCEDURE — 86140 C-REACTIVE PROTEIN: CPT | Performed by: STUDENT IN AN ORGANIZED HEALTH CARE EDUCATION/TRAINING PROGRAM

## 2025-07-31 PROCEDURE — 300N000007 HC RESEARCH B&I SPECIMEN PROCESSING, SIMPLE

## 2025-07-31 PROCEDURE — 510N000029 HC RESEARCH B&I MEALS, PER MEAL

## 2025-07-31 PROCEDURE — 82247 BILIRUBIN TOTAL: CPT | Performed by: STUDENT IN AN ORGANIZED HEALTH CARE EDUCATION/TRAINING PROGRAM

## 2025-07-31 PROCEDURE — 82550 ASSAY OF CK (CPK): CPT | Performed by: STUDENT IN AN ORGANIZED HEALTH CARE EDUCATION/TRAINING PROGRAM

## 2025-07-31 PROCEDURE — 83735 ASSAY OF MAGNESIUM: CPT | Performed by: STUDENT IN AN ORGANIZED HEALTH CARE EDUCATION/TRAINING PROGRAM

## 2025-07-31 PROCEDURE — 84550 ASSAY OF BLOOD/URIC ACID: CPT | Performed by: STUDENT IN AN ORGANIZED HEALTH CARE EDUCATION/TRAINING PROGRAM

## 2025-07-31 PROCEDURE — 999N000129 HC STATISTIC PICC/MID LINE PROC CANCELLED SUBQ WORKUP

## 2025-07-31 PROCEDURE — 510N000023 HC CRU B&I PATIENT CARE, PER 15 MIN

## 2025-07-31 PROCEDURE — 36000 PLACE NEEDLE IN VEIN: CPT

## 2025-07-31 PROCEDURE — 510N000025 HC RESEARCH B&I EARLY OR LATE SURCHARGE

## 2025-07-31 PROCEDURE — 82248 BILIRUBIN DIRECT: CPT | Performed by: STUDENT IN AN ORGANIZED HEALTH CARE EDUCATION/TRAINING PROGRAM

## 2025-07-31 PROCEDURE — 36415 COLL VENOUS BLD VENIPUNCTURE: CPT | Performed by: STUDENT IN AN ORGANIZED HEALTH CARE EDUCATION/TRAINING PROGRAM

## 2025-07-31 PROCEDURE — 258N000003 HC RX IP 258 OP 636: Performed by: STUDENT IN AN ORGANIZED HEALTH CARE EDUCATION/TRAINING PROGRAM

## 2025-07-31 RX ORDER — ALBUTEROL SULFATE 90 UG/1
1-2 INHALANT RESPIRATORY (INHALATION)
Status: ACTIVE | OUTPATIENT
Start: 2025-07-31

## 2025-07-31 RX ORDER — MEPERIDINE HYDROCHLORIDE 25 MG/ML
25 INJECTION INTRAMUSCULAR; INTRAVENOUS; SUBCUTANEOUS
Status: ACTIVE | OUTPATIENT
Start: 2025-07-31

## 2025-07-31 RX ORDER — DIPHENHYDRAMINE HYDROCHLORIDE 50 MG/ML
25 INJECTION, SOLUTION INTRAMUSCULAR; INTRAVENOUS
Status: ACTIVE | OUTPATIENT
Start: 2025-07-31

## 2025-07-31 RX ORDER — IBUPROFEN 200 MG
400 TABLET ORAL EVERY 8 HOURS PRN
Status: ACTIVE | OUTPATIENT
Start: 2025-07-31

## 2025-07-31 RX ORDER — EPINEPHRINE 1 MG/ML
0.3 INJECTION, SOLUTION, CONCENTRATE INTRAVENOUS EVERY 5 MIN PRN
Status: ACTIVE | OUTPATIENT
Start: 2025-07-31

## 2025-07-31 RX ORDER — ACETAMINOPHEN 325 MG/1
650 TABLET ORAL EVERY 6 HOURS
Status: DISPENSED | OUTPATIENT
Start: 2025-07-31 | End: 2025-08-01

## 2025-07-31 RX ORDER — DIPHENHYDRAMINE HYDROCHLORIDE 50 MG/ML
50 INJECTION, SOLUTION INTRAMUSCULAR; INTRAVENOUS
Status: ACTIVE | OUTPATIENT
Start: 2025-07-31

## 2025-07-31 RX ORDER — DEXAMETHASONE 4 MG/1
8 TABLET ORAL
Status: ACTIVE | OUTPATIENT
Start: 2025-07-31

## 2025-07-31 RX ORDER — ALBUTEROL SULFATE 0.83 MG/ML
2.5 SOLUTION RESPIRATORY (INHALATION)
Status: ACTIVE | OUTPATIENT
Start: 2025-07-31

## 2025-07-31 RX ORDER — ACETAMINOPHEN 325 MG/1
650 TABLET ORAL EVERY 4 HOURS PRN
Status: ACTIVE | OUTPATIENT
Start: 2025-07-31

## 2025-07-31 RX ORDER — DEXAMETHASONE 4 MG/1
8 TABLET ORAL ONCE
Status: COMPLETED | OUTPATIENT
Start: 2025-07-31 | End: 2025-07-31

## 2025-07-31 RX ORDER — FAMOTIDINE 20 MG/1
20 TABLET, FILM COATED ORAL EVERY 12 HOURS PRN
Status: ACTIVE | OUTPATIENT
Start: 2025-07-31

## 2025-07-31 RX ORDER — METHYLPREDNISOLONE SODIUM SUCCINATE 40 MG/ML
40 INJECTION INTRAMUSCULAR; INTRAVENOUS
Status: ACTIVE | OUTPATIENT
Start: 2025-07-31

## 2025-07-31 RX ADMIN — SODIUM CHLORIDE 1000 ML: 0.9 INJECTION, SOLUTION INTRAVENOUS at 10:21

## 2025-07-31 RX ADMIN — DEXAMETHASONE 8 MG: 4 TABLET ORAL at 10:08

## 2025-07-31 RX ADMIN — ACETAMINOPHEN 650 MG: 325 TABLET, FILM COATED ORAL at 10:09

## 2025-07-31 RX ADMIN — ACETAMINOPHEN 650 MG: 325 TABLET, FILM COATED ORAL at 16:13

## 2025-07-31 NOTE — PROGRESS NOTES
Clinical Research Unit Nursing Note:  Dion Bowman presents today for Cycle 1 Day 15, Study- AMG-509 (IDS #[id number]).     Patient seen by provider today: Yes: MD Tip                             present during visit today: Not Applicable.     Note:   Cycle 1 Day 15. Pre-infusion labs drawn, okay to proceed.Tolerated previous infusion without incident.    Intravenous Access:  2 peripheral IV(s) placed. 1 replaced 2/2 concern for infiltration.     Pre-medications:  Dexamethasone PO 8 mg(home dose) taken @2000 on 7/30/25 per patient.  NS 1 L IV Started: @1021 over 5 hours @ 200mL/h  Acetaminophen  mg @ 1010  Dexamethasone PO 8 mg @ 1010    Study - AMG-509 (IDS# [id number]) intravenous infusion:   Chemo Double Check  Protocol verified?: Yes  Height and weight verified?: Yes  BSA confirmed?: N/A  Meets treatment parameters?: Yes  Initial RN verification: Ingrid Cespedes RN  Second RN verification: Aidee Hernandez RN    Dose: 1.0 mg  Infusion started at 1036. Infusion stopped at 1040 2/2 pt report of stinging at IV site and suspected infiltration. VS monitored, IV removed & replaced in a different vein via ultrasound. MD came to bedside to assess pt. Approval given to restart infusion, restarted 1118. End of infusion (with 20 ml NS flush) ended at: 1222.     Post Infusion Assessment:  Vitals and labs obtained per protocol. Observed for 6 hrs post infusion. Patient tolerated infusion without incident. Patient afebrile without signs of neurotoxicity.  Denies new symptoms. No new muscle pain, muscle weakness, joint pain, or soft tissue swelling.    Patient Vitals for the past 24 hrs:   BP Temp Temp src Pulse Resp SpO2 Weight   07/31/25 1815 121/64 97.5  F (36.4  C) Oral 69 16 100 % --   07/31/25 1423 119/53 97.7  F (36.5  C) Oral -- 16 98 % --   07/31/25 1223 117/62 97.5  F (36.4  C) Oral -- 16 98 % --   07/31/25 1117 128/71 -- -- -- 16 -- --   07/31/25 1101 115/64 97.3  F (36.3  C) Temporal -- 16 97 % --  "  07/31/25 1046 95/53 97  F (36.1  C) Temporal 54 -- 96 % --   07/31/25 1027 114/59 97.5  F (36.4  C) Oral -- 16 99 % --   07/31/25 0728 132/64 97.3  F (36.3  C) Oral -- 15 99 % --   07/31/25 0717 -- -- -- -- -- -- 83.4 kg (183 lb 13.8 oz)       CRS Grading ~2 Hours Post EOI 6 Hours Post EOI   Time of Assessment: 1423 1815   Fever: </= 100.4 </= 100.4   Blood pressure: SBP >/= 90 (not hypotensive) SBP >/= 90 (not hypotensive)   Oxygen saturation: >/= 90% on room air (not hypoxic) >/= 90% on room air (not hypoxic)   Current CRS stgstrstastdstest:st st1st 0     ICE Score ~2 Hours Post EOI 6 Hours Post EOI   Time of Assessment: 1425 1816   There are signs of headache, muscle spasms or rigidity, or vision changes. No. No.   Orientation: Orientation to year, month, city, hospital: 4 points (1 point each) 4 4   Identify: Name 3 objects that provider points to (e.g. clock, pen, button): 3 points (1 point each) 3 3   Following Commands: (e.g. show me two fingers or close your eyes and stick out your tongue): 1 point 1 1   Writing: Ability to write a standard sentence (see writing page \"Our national bird is the bald eagle.\"): 1 point (Optional) N/A N/A   Attention: Count backwards from 100 by 10s: 1 point   1 1   Total: Max 10 9 9     Post Infusion Medications:  Acetaminophen  mg @ 1613    Discharge Plan:   Ok to discharge. Access discontinued per protocol.   Patient instructed to remain within 1 hour hospital proximity for 24 hours and take tylenol every 6 hours for 24 hours. Discharge AVS provided to patient.    Patient discharged in stable condition accompanied by: Self to be picked up by his wife.   Departure Mode: Ambulatory.    Fleecia Chacon RN on 7/31/2025 at 18:45 PM        Table 1: CRS Grading per ASTCT (https://doi.org/10.1016/j.bbmt.[email or web address])        "

## 2025-07-31 NOTE — PATIENT INSTRUCTIONS
Dion,   Here is what we discussed today:     Please continue to take tylenol 500mg every 6 hours for 4 doses AFTER you leave the CRU.   You should then take 1000mg of tylenol  three times a day if you develop any aches or pains.   Starting with today's dose, add ibuprofen 400mg (2 tablets) three times a day.   If you start taking ibuprofen, start taking famotidine 20mg twice a day also to protect the lining of your stomach.   If you develop muscle stiffness or spasms, you can start taking methacarbamol up to 4 times a day.   Please check your temperature at least twice a day and log them. Please check your blood pressure at the same time and record it.   If you develop a fever of 100.4F/38C or higher, please call me at any time of day (including overnight or weekends) at [phone]. If I do not  right away (especially overnight) call back in a few minutes.   You should also call if your blood pressure is less than 110 for the top number, you develop a rash, you have a sore throat, mouth/tongue/throat swelling, or you develop swelling anywhere.   Take just 4mg (1 tablet) of dexamethasone the evening prior to your next infusion as we begin your steroid taper.

## 2025-07-31 NOTE — PROGRESS NOTES
[id number]: Study Visit Note   Subject name: Dion Bowman     Visit: C1D15    Did the study visit occur within the appropriate window allowed by the protocol? yes    Since the last study visit, He has been doing well. He has a small, square shaped rash on his left antecubital space on his arm where his IV was for his last infusion. It appears to be a reaction the the chlorhexidine or adhesive. Pt will let us know if it does not resolve as expected. Pt has otherwise being doing well, with a good energy level. Pt does report some times of fatigue following physical activity which could be an early sign of the immunotherapy activating his system. No fever, GI symptoms or any new concerns or complaints. Pt has only taken the recommended tylenol and ibuprofen following his last visit.     I have personally interviewed Dion Bowman and reviewed his medical record for adverse events and concomitant medications and these have been recorded on the corresponding logs in Dion Bowman's research file.     Dion Bowman was given the opportunity to ask any trial related questions.  Please see provider progress note for physical exam and other clinical information. Labs were reviewed - any significant lab values were addressed and reviewed.    Ailyn Scott RN

## 2025-07-31 NOTE — PROGRESS NOTES
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  Patient presents with an elevated PSA of 5.59 ng/mL (05/12/2017), PSA 9.32 ng/mL (10/14/2019). MRI prostate 12/23/2019, revealed prostate measuring 2.4 x 4.6 x 4.1 cm with estimated volume of 23 g.  There was an 11.3 x 9.2 mm, lobulated T2 hypointense focus in the 5:00 position of the left base and mid gland peripheral zone with markedly hyperintense diffusion restriction and markedly hypointense ADC, with 6-15 mm capsular abutment with focal bulging and capsular irregularity (PI-RADS 5) designated as lesion 1.  There was an 8.4 x 8.8 mm moderately T2 hypointense lesion in the 7:00 position of the right base and mid gland peripheral zone, with moderately hyperintense DWI and moderate hypointense ADC, with 6-15 mm capsular abutment with smooth contour (PI-RADS 4) designated as lesion 2.  Lesion 1 approaches the neurovascular bundle.  There was no seminal vesicle involvement.  There was no lymphadenopathy.  There was no suspicious osseous lesions. CT abdomen, pelvis 02/26/2020, was negative for adenopathy or metastases.  Bone scan 02/26/2020, was negative for metastases.  There was mild increased uptake in the shoulders, knees, and feet consistent with degenerative changes. UroNav MRI-ultrasound fusion-guided prostate core needle biopsy 06/17/2020, revealed a Deb 4+4=8 ductal adenocarcinoma involving 90% of the core needle biopsy samples from the left mid lateral, 15% of the biopsy sample from the left lateral apex, and mixed ductal and acinar adenocarcinoma involving 80% of the biopsy sample from the left mid gland.  There was a Deb 4+3=7 mixed ductal and acinar adenocarcinoma involving 40% of the core needle biopsy sample from the left base, and 70%  of 2 of 3 biopsy samples from lesion 1 in the left base.  There was a Deb 3+4=7 acinar adenocarcinoma involving 30% of the core needle biopsy sample from the right mid lateral, and an adenocarcinoma involving 20% of the biopsy sample from the right lateral apex.  There was a [place] 3+3=6 acinar adenocarcinoma involving 30% of the core needle biopsy sample from the left apex, 20% of the biopsy sample from the right lateral base, 80% of the biopsy sample from the right base, 40% of 1 of 2 biopsy samples from lesion 2 in the right base, and an adenocarcinoma involving 10% of the biopsy sample from the right mid gland.  There was benign prostatic tissue in the core needle biopsy samples from the left lateral base, and right apex (14/17 samples involved).    Patient opted to proceed with robotic-assisted laparoscopic prostatectomy and bilateral pelvic lymph node dissection 04/29/2020, that revealed a [place] 4+3=7 adenocarcinoma, mixed acinar (80%) and ductal (20%) types, involving 20% of the total prostatic volume.  The tumor was confined to the prostate without extraprostatic extension.  There was no lymphovascular or perineural invasion.  There was no urinary bladder neck invasion and seminal vesicles were not involved.  Surgical margins were tumor free. Nine pelvic lymph nodes were negative for metastases (0/9 lymph nodes involved).  PSA was undetectable at <0.04 ng/mL (09/04/2020) with subsequent biochemical progression with PSA 0.04 ng/mL (12/18/2020), PSA 0.05 ng/mL (03/19/2021), PSA 0.14 ng/mL (06/18/2021). Patient received salvage radiation therapy to the prostate bed (6840 cGy in 38 fractions) and pelvic lymph nodes (4500 cGy in 25 fractions) from 07/14/2021 through 09/07/2021, without androgen deprivation therapy (ADT).     There was a PSA yobany of <0.04 ng/mL (12/09/2021 through 12/29/2022), with subsequent biochemical progression with PSA 0.06 ng/mL (04/11/2023), PSA 0.18 ng/mL (01/04/2024), PSA 0.34  "ng/mL (10/17/2024), PSA 0.54 ng/mL (12/19/2024). 68-Ga PSMA-11 PET/CT scan 10/31/2024, revealed postsurgical changes related to prior prostatectomy.  There was no PSMA uptake in the prostatectomy bed.  There were no PSMA-avid metastases.  There was subtle focal PSMA uptake in the left posterior sixth rib without CT correlate with SUV max 1.6 and SUV mean 0.9, and an additional subtle focus of PSMA uptake in the left eighth rib without CT correlate.  Patient has mild positional urinary incontinence particularly when squatting, and nocturia 1-2 times per night.  Patient exercises regularly. There are no other new symptoms or events since the prior clinic visit (03/20/2025).     He was initiated on [id number] per protocol on 7/15/25.     INTERVAL HISTORY:   Continues to feel pretty good.  Feels a little \"off,\" but otherwise normal activity.  Appetite is good.  No limitations or changes in activity.  Has a small rash at the prior IV site in L AC fossa that is red, but not itchy or blistered.  No edema in extremities, face, mouth, tongue, or throat.  No fevers, chills, nausea or vomiting.  No muscle aches, myalgias, or arthralgias.      Study Treatment History:     -7/15/25-C1D1 [id number] at 0.1mg.  Tolerated without issues.   -7/24/25-C1D8 [id number] at 0.3mg.   -7/31/25-C1D15 [id number] at 1mg.  PIV infiltration within a few minutes of infusion start.  He had mild hypotension that spontaneously resolved and then continued rest of infusion with a new IV.      MEDICATIONS:   Current Outpatient Medications   Medication Sig Dispense Refill    acetaminophen (TYLENOL) 500 MG tablet Take 1 tablet (500 mg) by mouth every 6 hours. Take for the first 24 hours after discharge for all xaluritamig doses in Cycle 1. Maximum 4000 mg in 24 hours. 32 tablet 0    amLODIPine (NORVASC) 10 MG tablet Take 1 tablet (10 mg) by mouth daily. 90 tablet 3    atenolol (TENORMIN) 100 MG tablet Take 1 tablet (100 mg) by mouth daily. 90 tablet 3    atorvastatin " (LIPITOR) 20 MG tablet Take 1 tablet (20 mg) by mouth daily. 90 tablet 3    dexAMETHasone (DECADRON) 4 MG tablet Take 2 tablets (8 mg) by mouth See Admin Instructions for 4 doses. Take the evening (approximately 12-16 hours) prior to the first 3 xaluritamig doses.  Record time taken. 30 tablet 0    famotidine (PEPCID) 20 MG tablet Take 1 tablet (20 mg) by mouth 2 times daily as needed (Take if using ibuprofen). 60 tablet 1    FISH OIL 1200 MG OR CAPS Take 2 capsules by mouth every other day       ibuprofen (ADVIL/MOTRIN) 200 MG tablet Take 2 tablets (400 mg) by mouth every 6 hours as needed for pain (Muscle and joint aches). 200 tablet 3    lisinopril-hydrochlorothiazide (ZESTORETIC) 20-12.5 MG tablet TAKE 2 TABLETS BY MOUTH EVERY MORNING 180 tablet 3    methocarbamol (ROBAXIN) 750 MG tablet Take 1 tablet (750 mg) by mouth 4 times daily as needed for muscle spasms (muscle aches). 120 tablet 3    sildenafil (REVATIO) 20 MG tablet Take 1 tablet (20 mg) by mouth daily as needed (sexual activity) 30 tablet 3    VITAMIN D, CHOLECALCIFEROL, PO Take 1,000 Units by mouth daily         REVIEW OF SYSTEMS: Review of systems reviewed with the patient and otherwise negative except for those detailed above.    PHYSICAL EXAM: /62 (BP Location: Left arm, Patient Position: Sitting, Cuff Size: Adult Regular)   Pulse 54   Temp 97.5  F (36.4  C) (Oral)   Resp 16   Wt 83.4 kg (183 lb 13.8 oz)   SpO2 98%   BMI 25.46 kg/m  . Body surface area is 2.05 meters squared. ECOG performance status: 0.     Exam:  Constitutional: healthy, alert, and no distress  Head: Normocephalic. No masses, lesions, tenderness or abnormalities grossly   ENT: ENT exam normal, no erythema of OP, no tongue or oral lesions  Cardiovascular: negative, No edema or JVD. RRR, no m/r/g appreciated.   Respiratory: negative, CTAB, normal WOB on RA.   Gastrointestinal: Abdomen soft, non-tender. BS normal. No masses, organomegaly  : Deferred  Musculoskeletal:  extremities normal- no gross deformities noted and normal muscle tone, normal bulk   Skin: no suspicious lesions or rashes on exposed areas of skin except for small patch ~2x2cm in L AC fossa with mild erythema, no exudate or blistering.  L inner arm PIV site with infiltration mildly tender and firm.  No erythema or overlying rash at infiltration site.   Neurologic: CNII-XII intact, normal speech, moving all 4 extremities symmetrically and spontaneously.   Psychiatric: mentation appears normal and affect normal/bright    LABORATORY REVIEWED:     7/31/25 labs  CMP with Na mildly low at 131 and Cl 97, remaining lytes and LFTs WNL.  Ferritin eleveated to 513 (down from 551 week prior) from baseline of 415.  CRP remains WNL.      CBC with WBC of 10.5, ANC 10.1, ALC 0.2, Hgb 13.8, MCV 91, and plt 271.      IMPRESSION/PLAN: Stage IIC mixed acinar and ductal adenocarcinoma of the prostate.  Prostate cancer is a Deb 4+3=7 mixed acinar and ductal adenocarcinoma confined to the prostate without extraprostatic extension, seminal vesicle involvement, or regional or distant metastases.  Patient underwent definitive surgery (04/29/2020).  PSA was undetectable postoperatively (09/04/2020) with subsequent biochemical progression with PSA 0.04 ng/mL (12/18/2020) to PSA 0.14 ng/mL (06/18/2021). Patient received salvage radiation therapy to the prostate bed and pelvic lymph nodes without concurrent ADT (from 07/14/2021 through 09/07/2021).  PSA was undetectable x1 year (from 12/09/2021 through 12/29/2022), with subsequent biochemical progression with PSA 0.06 ng/mL (04/11/2023) to PSA 0.54 ng/mL (12/19/2024). PSMA PET/CT scan (10/31/2024) was negative for evidence of recurrence or metastases.  The subtle foci of PSMA uptake in the left posterior sixth rib and left eighth rib were without CT correlate and not suspicious for metastases.  PSA doubling time is calculated at 6-7 months indicating a high risk biochemical recurrence. He  "was consented for the ZEK211 biochemical relapse study and initiated therapy per protocol on 7/15/25.  He returned for C1D8 on 7/24/25 and labs/exam were appropriate to proceed with C1D8 dosing at 0.3mg of [id number].      -7/15/25-C1D1 [id number] at 0.1mg.  Tolerated without issues.   -7/24/25-C1D8 [id number] at 0.3mg. Tolerated with just slightly feeling \"off.\"   -7/31/25-C1D15 [id number] at 1mg.  PIV infiltration in R inner arm with mild hypotension after that spontaneously resolved.  Remaining infusion completed without issues using new IV.      PLAN:   Please continue to take tylenol 500mg every 6 hours for 4 doses AFTER you leave the CRU.   You should then take 1000mg of tylenol  three times a day if you develop any aches or pains.   Starting with today's dose, add ibuprofen 400mg (2 tablets) three times a day.   If you start taking ibuprofen, start taking famotidine 20mg twice a day also to protect the lining of your stomach.   If you develop muscle stiffness or spasms, you can start taking methacarbamol up to 4 times a day.   Please check your temperature at least twice a day and log them. Please check your blood pressure at the same time and record it.   If you develop a fever of 100.4F/38C or higher, please call me at any time of day (including overnight or weekends) at [phone]. If I do not  right away (especially overnight) call back in a few minutes.   You should also call if your blood pressure is less than 110 for the top number, you develop a rash, you have a sore throat, mouth/tongue/throat swelling, or you develop swelling anywhere.   Take just 4mg (1 tablet) of dexamethasone the evening prior to your next infusion as we begin your steroid taper.      A total of 90 minutes spent on the date of the encounter doing chart review, review of test results, interpretation of tests, patient visit, and documentation.  The patient was given the opportunity to ask multiple questions today, all of which were " answered to their satisfaction.    The longitudinal plan of care for biochemical relapse of prostate cancer was addressed during this visit. Due to the added complexity in care, I will continue to support Dion Bowman in the subsequent management of this condition(s) and with the ongoing continuity of care of this condition(s).     Surjit Whelan MD, PhD   of Medicine   Oncology/BMT/Cellular Therapies  [

## 2025-08-04 ENCOUNTER — TELEPHONE (OUTPATIENT)
Dept: ONCOLOGY | Facility: CLINIC | Age: 70
End: 2025-08-04
Payer: COMMERCIAL

## 2025-08-13 ENCOUNTER — ONCOLOGY VISIT (OUTPATIENT)
Dept: ONCOLOGY | Facility: CLINIC | Age: 70
End: 2025-08-13
Attending: STUDENT IN AN ORGANIZED HEALTH CARE EDUCATION/TRAINING PROGRAM
Payer: COMMERCIAL

## 2025-08-13 ENCOUNTER — ALLIED HEALTH/NURSE VISIT (OUTPATIENT)
Dept: ONCOLOGY | Facility: CLINIC | Age: 70
End: 2025-08-13

## 2025-08-13 ENCOUNTER — LAB (OUTPATIENT)
Dept: LAB | Facility: CLINIC | Age: 70
End: 2025-08-13
Attending: STUDENT IN AN ORGANIZED HEALTH CARE EDUCATION/TRAINING PROGRAM
Payer: COMMERCIAL

## 2025-08-13 VITALS
OXYGEN SATURATION: 97 % | TEMPERATURE: 97.6 F | DIASTOLIC BLOOD PRESSURE: 59 MMHG | RESPIRATION RATE: 18 BRPM | SYSTOLIC BLOOD PRESSURE: 105 MMHG | HEART RATE: 78 BPM

## 2025-08-13 VITALS
BODY MASS INDEX: 25.24 KG/M2 | TEMPERATURE: 97.4 F | DIASTOLIC BLOOD PRESSURE: 65 MMHG | RESPIRATION RATE: 18 BRPM | WEIGHT: 182.3 LBS | SYSTOLIC BLOOD PRESSURE: 113 MMHG | OXYGEN SATURATION: 99 % | HEART RATE: 83 BPM

## 2025-08-13 DIAGNOSIS — C61 HORMONE SENSITIVE PROSTATE CANCER (H): ICD-10-CM

## 2025-08-13 DIAGNOSIS — Z19.1 HORMONE SENSITIVE PROSTATE CANCER (H): ICD-10-CM

## 2025-08-13 DIAGNOSIS — C61 HORMONE SENSITIVE PROSTATE CANCER (H): Primary | ICD-10-CM

## 2025-08-13 DIAGNOSIS — M79.10 MYALGIA: ICD-10-CM

## 2025-08-13 DIAGNOSIS — C61 PROSTATE CANCER (H): Primary | ICD-10-CM

## 2025-08-13 DIAGNOSIS — R97.21 RISING PSA FOLLOWING TREATMENT FOR MALIGNANT NEOPLASM OF PROSTATE: ICD-10-CM

## 2025-08-13 DIAGNOSIS — Z19.1 HORMONE SENSITIVE PROSTATE CANCER (H): Primary | ICD-10-CM

## 2025-08-13 DIAGNOSIS — R97.21 RISING PSA FOLLOWING TREATMENT FOR MALIGNANT NEOPLASM OF PROSTATE: Primary | ICD-10-CM

## 2025-08-13 LAB
ALBUMIN SERPL BCG-MCNC: 3.2 G/DL (ref 3.5–5.2)
ALP SERPL-CCNC: 253 U/L (ref 40–150)
ALT SERPL W P-5'-P-CCNC: 37 U/L (ref 0–70)
ANION GAP SERPL CALCULATED.3IONS-SCNC: 15 MMOL/L (ref 7–15)
AST SERPL W P-5'-P-CCNC: 39 U/L (ref 0–45)
BASOPHILS # BLD MANUAL: 0 10E3/UL (ref 0–0.2)
BASOPHILS NFR BLD MANUAL: 0 %
BILIRUB SERPL-MCNC: 0.4 MG/DL
BILIRUBIN DIRECT (ROCHE PRO & PURE): 0.21 MG/DL (ref 0–0.45)
BUN SERPL-MCNC: 20.5 MG/DL (ref 8–23)
CALCIUM SERPL-MCNC: 9.1 MG/DL (ref 8.8–10.4)
CHLORIDE SERPL-SCNC: 89 MMOL/L (ref 98–107)
CK SERPL-CCNC: 119 U/L (ref 39–308)
CREAT SERPL-MCNC: 0.88 MG/DL (ref 0.67–1.17)
CRP SERPL-MCNC: 212 MG/L
EGFRCR SERPLBLD CKD-EPI 2021: >90 ML/MIN/1.73M2
EOSINOPHIL # BLD MANUAL: 0 10E3/UL (ref 0–0.7)
EOSINOPHIL NFR BLD MANUAL: 0 %
ERYTHROCYTE [DISTWIDTH] IN BLOOD BY AUTOMATED COUNT: 12.6 % (ref 10–15)
FERRITIN SERPL-MCNC: 2167 NG/ML (ref 31–409)
GLUCOSE SERPL-MCNC: 305 MG/DL (ref 70–99)
HCO3 SERPL-SCNC: 22 MMOL/L (ref 22–29)
HCT VFR BLD AUTO: 38.8 % (ref 40–53)
HGB BLD-MCNC: 13.3 G/DL (ref 13.3–17.7)
LDH SERPL L TO P-CCNC: 161 U/L (ref 0–250)
LYMPHOCYTES # BLD MANUAL: 0 10E3/UL (ref 0.8–5.3)
LYMPHOCYTES NFR BLD MANUAL: 0 %
MAGNESIUM SERPL-MCNC: 2 MG/DL (ref 1.7–2.3)
MCH RBC QN AUTO: 31.5 PG (ref 26.5–33)
MCHC RBC AUTO-ENTMCNC: 34.3 G/DL (ref 31.5–36.5)
MCV RBC AUTO: 91.9 FL (ref 78–100)
METAMYELOCYTES # BLD MANUAL: 0.7 10E3/UL
METAMYELOCYTES NFR BLD MANUAL: 3.4 %
MONOCYTES # BLD MANUAL: 0.18 10E3/UL (ref 0–1.3)
MONOCYTES NFR BLD MANUAL: 0.9 %
MYELOCYTES # BLD MANUAL: 0.7 10E3/UL
MYELOCYTES NFR BLD MANUAL: 3.5 %
NEUTROPHILS # BLD MANUAL: 18.74 10E3/UL (ref 1.6–8.3)
NEUTROPHILS NFR BLD MANUAL: 92.2 %
PHOSPHATE SERPL-MCNC: 3 MG/DL (ref 2.5–4.5)
PLAT MORPH BLD: NORMAL
PLATELET # BLD AUTO: 604 10E3/UL (ref 150–450)
POTASSIUM SERPL-SCNC: 4.8 MMOL/L (ref 3.4–5.3)
PROT SERPL-MCNC: 6.9 G/DL (ref 6.4–8.3)
PSA SERPL DL<=0.01 NG/ML-MCNC: 0.76 NG/ML (ref 0–4.5)
RBC # BLD AUTO: 4.22 10E6/UL (ref 4.4–5.9)
RBC MORPH BLD: NORMAL
RETICS # AUTO: 0.06 10E6/UL (ref 0.03–0.1)
RETICS/RBC NFR AUTO: 1.32 % (ref 0.5–2)
SODIUM SERPL-SCNC: 126 MMOL/L (ref 135–145)
URATE SERPL-MCNC: 3.6 MG/DL (ref 3.4–7)
WBC # BLD AUTO: 20.32 10E3/UL (ref 4–11)

## 2025-08-13 PROCEDURE — 83735 ASSAY OF MAGNESIUM: CPT | Performed by: STUDENT IN AN ORGANIZED HEALTH CARE EDUCATION/TRAINING PROGRAM

## 2025-08-13 PROCEDURE — 36415 COLL VENOUS BLD VENIPUNCTURE: CPT | Performed by: STUDENT IN AN ORGANIZED HEALTH CARE EDUCATION/TRAINING PROGRAM

## 2025-08-13 PROCEDURE — 82550 ASSAY OF CK (CPK): CPT | Performed by: STUDENT IN AN ORGANIZED HEALTH CARE EDUCATION/TRAINING PROGRAM

## 2025-08-13 PROCEDURE — 250N000013 HC RX MED GY IP 250 OP 250 PS 637: Performed by: NURSE PRACTITIONER

## 2025-08-13 PROCEDURE — 85045 AUTOMATED RETICULOCYTE COUNT: CPT | Performed by: STUDENT IN AN ORGANIZED HEALTH CARE EDUCATION/TRAINING PROGRAM

## 2025-08-13 PROCEDURE — 85007 BL SMEAR W/DIFF WBC COUNT: CPT | Performed by: STUDENT IN AN ORGANIZED HEALTH CARE EDUCATION/TRAINING PROGRAM

## 2025-08-13 PROCEDURE — 250N000012 HC RX MED GY IP 250 OP 636 PS 637: Performed by: NURSE PRACTITIONER

## 2025-08-13 PROCEDURE — G0463 HOSPITAL OUTPT CLINIC VISIT: HCPCS | Performed by: NURSE PRACTITIONER

## 2025-08-13 PROCEDURE — 84100 ASSAY OF PHOSPHORUS: CPT | Performed by: STUDENT IN AN ORGANIZED HEALTH CARE EDUCATION/TRAINING PROGRAM

## 2025-08-13 PROCEDURE — 82728 ASSAY OF FERRITIN: CPT | Performed by: STUDENT IN AN ORGANIZED HEALTH CARE EDUCATION/TRAINING PROGRAM

## 2025-08-13 PROCEDURE — 82248 BILIRUBIN DIRECT: CPT | Performed by: STUDENT IN AN ORGANIZED HEALTH CARE EDUCATION/TRAINING PROGRAM

## 2025-08-13 PROCEDURE — 82310 ASSAY OF CALCIUM: CPT | Performed by: STUDENT IN AN ORGANIZED HEALTH CARE EDUCATION/TRAINING PROGRAM

## 2025-08-13 PROCEDURE — 86140 C-REACTIVE PROTEIN: CPT | Performed by: STUDENT IN AN ORGANIZED HEALTH CARE EDUCATION/TRAINING PROGRAM

## 2025-08-13 PROCEDURE — 84550 ASSAY OF BLOOD/URIC ACID: CPT | Performed by: STUDENT IN AN ORGANIZED HEALTH CARE EDUCATION/TRAINING PROGRAM

## 2025-08-13 PROCEDURE — 84403 ASSAY OF TOTAL TESTOSTERONE: CPT | Performed by: STUDENT IN AN ORGANIZED HEALTH CARE EDUCATION/TRAINING PROGRAM

## 2025-08-13 PROCEDURE — 85027 COMPLETE CBC AUTOMATED: CPT | Performed by: STUDENT IN AN ORGANIZED HEALTH CARE EDUCATION/TRAINING PROGRAM

## 2025-08-13 PROCEDURE — 84153 ASSAY OF PSA TOTAL: CPT | Performed by: STUDENT IN AN ORGANIZED HEALTH CARE EDUCATION/TRAINING PROGRAM

## 2025-08-13 PROCEDURE — 83615 LACTATE (LD) (LDH) ENZYME: CPT | Performed by: STUDENT IN AN ORGANIZED HEALTH CARE EDUCATION/TRAINING PROGRAM

## 2025-08-13 RX ORDER — DEXAMETHASONE 4 MG/1
8 TABLET ORAL ONCE
OUTPATIENT
Start: 2025-08-18 | End: 2025-08-18

## 2025-08-13 RX ORDER — DIPHENHYDRAMINE HYDROCHLORIDE 50 MG/ML
25 INJECTION, SOLUTION INTRAMUSCULAR; INTRAVENOUS
Start: 2025-08-31

## 2025-08-13 RX ORDER — DIPHENHYDRAMINE HYDROCHLORIDE 50 MG/ML
25 INJECTION, SOLUTION INTRAMUSCULAR; INTRAVENOUS
Start: 2025-08-18

## 2025-08-13 RX ORDER — DIPHENHYDRAMINE HYDROCHLORIDE 50 MG/ML
50 INJECTION, SOLUTION INTRAMUSCULAR; INTRAVENOUS
Start: 2025-08-18

## 2025-08-13 RX ORDER — HEPARIN SODIUM (PORCINE) LOCK FLUSH IV SOLN 100 UNIT/ML 100 UNIT/ML
5 SOLUTION INTRAVENOUS
OUTPATIENT
Start: 2025-08-31

## 2025-08-13 RX ORDER — DEXAMETHASONE 4 MG/1
4 TABLET ORAL ONCE
Start: 2025-08-31 | End: 2025-08-26

## 2025-08-13 RX ORDER — HEPARIN SODIUM (PORCINE) LOCK FLUSH IV SOLN 100 UNIT/ML 100 UNIT/ML
5 SOLUTION INTRAVENOUS
OUTPATIENT
Start: 2025-08-18

## 2025-08-13 RX ORDER — FAMOTIDINE 20 MG/1
20 TABLET, FILM COATED ORAL EVERY 12 HOURS PRN
OUTPATIENT
Start: 2025-08-18

## 2025-08-13 RX ORDER — ACETAMINOPHEN 325 MG/1
650 TABLET ORAL ONCE
OUTPATIENT
Start: 2025-08-18 | End: 2025-08-18

## 2025-08-13 RX ORDER — ALBUTEROL SULFATE 90 UG/1
1-2 INHALANT RESPIRATORY (INHALATION)
Start: 2025-08-31

## 2025-08-13 RX ORDER — IBUPROFEN 200 MG
400 TABLET ORAL EVERY 8 HOURS PRN
OUTPATIENT
Start: 2025-08-31

## 2025-08-13 RX ORDER — HEPARIN SODIUM,PORCINE 10 UNIT/ML
5-20 VIAL (ML) INTRAVENOUS DAILY PRN
OUTPATIENT
Start: 2025-08-31

## 2025-08-13 RX ORDER — DEXAMETHASONE 4 MG/1
8 TABLET ORAL ONCE
Status: COMPLETED | OUTPATIENT
Start: 2025-08-13 | End: 2025-08-13

## 2025-08-13 RX ORDER — ACETAMINOPHEN 325 MG/1
650 TABLET ORAL EVERY 4 HOURS PRN
OUTPATIENT
Start: 2025-08-31

## 2025-08-13 RX ORDER — ALBUTEROL SULFATE 0.83 MG/ML
2.5 SOLUTION RESPIRATORY (INHALATION)
OUTPATIENT
Start: 2025-08-31

## 2025-08-13 RX ORDER — EPINEPHRINE 1 MG/ML
0.3 INJECTION, SOLUTION INTRAMUSCULAR; SUBCUTANEOUS EVERY 5 MIN PRN
OUTPATIENT
Start: 2025-08-31

## 2025-08-13 RX ORDER — DIPHENHYDRAMINE HYDROCHLORIDE 50 MG/ML
50 INJECTION, SOLUTION INTRAMUSCULAR; INTRAVENOUS
Start: 2025-08-31

## 2025-08-13 RX ORDER — ACETAMINOPHEN 325 MG/1
650 TABLET ORAL ONCE
Status: COMPLETED | OUTPATIENT
Start: 2025-08-13 | End: 2025-08-13

## 2025-08-13 RX ORDER — IBUPROFEN 200 MG
400 TABLET ORAL EVERY 8 HOURS PRN
OUTPATIENT
Start: 2025-08-18

## 2025-08-13 RX ORDER — METHYLPREDNISOLONE SODIUM SUCCINATE 40 MG/ML
40 INJECTION INTRAMUSCULAR; INTRAVENOUS
Start: 2025-08-18

## 2025-08-13 RX ORDER — ALBUTEROL SULFATE 0.83 MG/ML
2.5 SOLUTION RESPIRATORY (INHALATION)
OUTPATIENT
Start: 2025-08-18

## 2025-08-13 RX ORDER — ACETAMINOPHEN 325 MG/1
650 TABLET ORAL ONCE
Start: 2025-08-31 | End: 2025-08-26

## 2025-08-13 RX ORDER — DEXAMETHASONE 4 MG/1
8 TABLET ORAL
OUTPATIENT
Start: 2025-08-18

## 2025-08-13 RX ORDER — MEPERIDINE HYDROCHLORIDE 25 MG/ML
25 INJECTION INTRAMUSCULAR; INTRAVENOUS; SUBCUTANEOUS
OUTPATIENT
Start: 2025-08-31

## 2025-08-13 RX ORDER — LORAZEPAM 2 MG/ML
0.5 INJECTION INTRAMUSCULAR EVERY 4 HOURS PRN
OUTPATIENT
Start: 2025-08-18

## 2025-08-13 RX ORDER — ACETAMINOPHEN 325 MG/1
650 TABLET ORAL EVERY 4 HOURS PRN
OUTPATIENT
Start: 2025-08-18

## 2025-08-13 RX ORDER — HEPARIN SODIUM,PORCINE 10 UNIT/ML
5-20 VIAL (ML) INTRAVENOUS DAILY PRN
OUTPATIENT
Start: 2025-08-18

## 2025-08-13 RX ORDER — EPINEPHRINE 1 MG/ML
0.3 INJECTION, SOLUTION INTRAMUSCULAR; SUBCUTANEOUS EVERY 5 MIN PRN
OUTPATIENT
Start: 2025-08-18

## 2025-08-13 RX ORDER — DEXAMETHASONE 4 MG/1
8 TABLET ORAL
OUTPATIENT
Start: 2025-08-31

## 2025-08-13 RX ORDER — METHYLPREDNISOLONE SODIUM SUCCINATE 40 MG/ML
40 INJECTION INTRAMUSCULAR; INTRAVENOUS
Start: 2025-08-31

## 2025-08-13 RX ORDER — MEPERIDINE HYDROCHLORIDE 25 MG/ML
25 INJECTION INTRAMUSCULAR; INTRAVENOUS; SUBCUTANEOUS
OUTPATIENT
Start: 2025-08-18

## 2025-08-13 RX ORDER — LORAZEPAM 2 MG/ML
0.5 INJECTION INTRAMUSCULAR EVERY 4 HOURS PRN
OUTPATIENT
Start: 2025-08-31

## 2025-08-13 RX ORDER — ALBUTEROL SULFATE 90 UG/1
1-2 INHALANT RESPIRATORY (INHALATION)
Start: 2025-08-18

## 2025-08-13 RX ORDER — FAMOTIDINE 20 MG/1
20 TABLET, FILM COATED ORAL EVERY 12 HOURS PRN
OUTPATIENT
Start: 2025-08-31

## 2025-08-13 RX ORDER — ACETAMINOPHEN 325 MG/1
650 TABLET ORAL ONCE
Status: CANCELLED
Start: 2025-08-13 | End: 2025-08-13

## 2025-08-13 RX ORDER — DEXAMETHASONE 4 MG/1
8 TABLET ORAL ONCE
Status: CANCELLED
Start: 2025-08-13 | End: 2025-08-13

## 2025-08-13 RX ADMIN — ACETAMINOPHEN 650 MG: 325 TABLET ORAL at 13:00

## 2025-08-13 RX ADMIN — DEXAMETHASONE 8 MG: 4 TABLET ORAL at 13:00

## 2025-08-13 ASSESSMENT — PAIN SCALES - GENERAL: PAINLEVEL_OUTOF10: NO PAIN (0)

## 2025-08-16 LAB — TESTOST SERPL-MCNC: 147 NG/DL (ref 240–950)

## 2025-08-18 ENCOUNTER — INFUSION THERAPY VISIT (OUTPATIENT)
Dept: ONCOLOGY | Facility: CLINIC | Age: 70
End: 2025-08-18
Attending: STUDENT IN AN ORGANIZED HEALTH CARE EDUCATION/TRAINING PROGRAM
Payer: COMMERCIAL

## 2025-08-18 ENCOUNTER — RESULTS FOLLOW-UP (OUTPATIENT)
Dept: ONCOLOGY | Facility: CLINIC | Age: 70
End: 2025-08-18

## 2025-08-18 ENCOUNTER — ALLIED HEALTH/NURSE VISIT (OUTPATIENT)
Dept: ONCOLOGY | Facility: CLINIC | Age: 70
End: 2025-08-18

## 2025-08-18 VITALS
RESPIRATION RATE: 16 BRPM | SYSTOLIC BLOOD PRESSURE: 107 MMHG | TEMPERATURE: 97.5 F | HEART RATE: 61 BPM | OXYGEN SATURATION: 98 % | DIASTOLIC BLOOD PRESSURE: 60 MMHG

## 2025-08-18 VITALS
BODY MASS INDEX: 24.16 KG/M2 | OXYGEN SATURATION: 99 % | DIASTOLIC BLOOD PRESSURE: 74 MMHG | RESPIRATION RATE: 17 BRPM | SYSTOLIC BLOOD PRESSURE: 127 MMHG | HEART RATE: 78 BPM | WEIGHT: 174.5 LBS | TEMPERATURE: 97.8 F

## 2025-08-18 DIAGNOSIS — C61 HORMONE SENSITIVE PROSTATE CANCER (H): Primary | ICD-10-CM

## 2025-08-18 DIAGNOSIS — C61 PROSTATE CANCER (H): Primary | ICD-10-CM

## 2025-08-18 DIAGNOSIS — Z19.1 HORMONE SENSITIVE PROSTATE CANCER (H): Primary | ICD-10-CM

## 2025-08-18 DIAGNOSIS — R97.21 RISING PSA FOLLOWING TREATMENT FOR MALIGNANT NEOPLASM OF PROSTATE: ICD-10-CM

## 2025-08-18 DIAGNOSIS — Z19.1 HORMONE SENSITIVE PROSTATE CANCER (H): ICD-10-CM

## 2025-08-18 DIAGNOSIS — C61 HORMONE SENSITIVE PROSTATE CANCER (H): ICD-10-CM

## 2025-08-18 LAB
ALBUMIN SERPL BCG-MCNC: 3.4 G/DL (ref 3.5–5.2)
ALP SERPL-CCNC: 170 U/L (ref 40–150)
ALT SERPL W P-5'-P-CCNC: 39 U/L (ref 0–70)
ANION GAP SERPL CALCULATED.3IONS-SCNC: 14 MMOL/L (ref 7–15)
AST SERPL W P-5'-P-CCNC: 21 U/L (ref 0–45)
BASOPHILS # BLD MANUAL: 0 10E3/UL (ref 0–0.2)
BASOPHILS NFR BLD MANUAL: 0 %
BILIRUB SERPL-MCNC: 0.4 MG/DL
BILIRUBIN DIRECT (ROCHE PRO & PURE): 0.2 MG/DL (ref 0–0.45)
BUN SERPL-MCNC: 16.6 MG/DL (ref 8–23)
CALCIUM SERPL-MCNC: 9 MG/DL (ref 8.8–10.4)
CHLORIDE SERPL-SCNC: 93 MMOL/L (ref 98–107)
CK SERPL-CCNC: 34 U/L (ref 39–308)
CREAT SERPL-MCNC: 0.92 MG/DL (ref 0.67–1.17)
CRP SERPL-MCNC: 37.2 MG/L
EGFRCR SERPLBLD CKD-EPI 2021: 90 ML/MIN/1.73M2
EOSINOPHIL # BLD MANUAL: 0 10E3/UL (ref 0–0.7)
EOSINOPHIL NFR BLD MANUAL: 0 %
ERYTHROCYTE [DISTWIDTH] IN BLOOD BY AUTOMATED COUNT: 12.3 % (ref 10–15)
FERRITIN SERPL-MCNC: 1725 NG/ML (ref 31–409)
GLUCOSE SERPL-MCNC: 308 MG/DL (ref 70–99)
HCO3 SERPL-SCNC: 21 MMOL/L (ref 22–29)
HCT VFR BLD AUTO: 36.9 % (ref 40–53)
HGB BLD-MCNC: 12.7 G/DL (ref 13.3–17.7)
LDH SERPL L TO P-CCNC: 170 U/L (ref 0–250)
LYMPHOCYTES # BLD MANUAL: 0.72 10E3/UL (ref 0.8–5.3)
LYMPHOCYTES NFR BLD MANUAL: 10.3 %
MAGNESIUM SERPL-MCNC: 1.9 MG/DL (ref 1.7–2.3)
MCH RBC QN AUTO: 32.2 PG (ref 26.5–33)
MCHC RBC AUTO-ENTMCNC: 34.4 G/DL (ref 31.5–36.5)
MCV RBC AUTO: 93.4 FL (ref 78–100)
METAMYELOCYTES # BLD MANUAL: 0.3 10E3/UL
METAMYELOCYTES NFR BLD MANUAL: 4.3 %
MONOCYTES # BLD MANUAL: 0.18 10E3/UL (ref 0–1.3)
MONOCYTES NFR BLD MANUAL: 2.6 %
NEUTROPHILS # BLD MANUAL: 5.78 10E3/UL (ref 1.6–8.3)
NEUTROPHILS NFR BLD MANUAL: 82.8 %
PHOSPHATE SERPL-MCNC: 2.4 MG/DL (ref 2.5–4.5)
PLAT MORPH BLD: NORMAL
PLATELET # BLD AUTO: 692 10E3/UL (ref 150–450)
POTASSIUM SERPL-SCNC: 4.8 MMOL/L (ref 3.4–5.3)
PROT SERPL-MCNC: 6.5 G/DL (ref 6.4–8.3)
PSA SERPL DL<=0.01 NG/ML-MCNC: 0.78 NG/ML (ref 0–4.5)
RBC # BLD AUTO: 3.95 10E6/UL (ref 4.4–5.9)
RBC MORPH BLD: NORMAL
RETICS # AUTO: 0.05 10E6/UL (ref 0.03–0.1)
RETICS/RBC NFR AUTO: 1.24 % (ref 0.5–2)
SODIUM SERPL-SCNC: 128 MMOL/L (ref 135–145)
URATE SERPL-MCNC: 4.2 MG/DL (ref 3.4–7)
WBC # BLD AUTO: 6.98 10E3/UL (ref 4–11)

## 2025-08-18 PROCEDURE — 83735 ASSAY OF MAGNESIUM: CPT | Performed by: STUDENT IN AN ORGANIZED HEALTH CARE EDUCATION/TRAINING PROGRAM

## 2025-08-18 PROCEDURE — 84403 ASSAY OF TOTAL TESTOSTERONE: CPT | Performed by: STUDENT IN AN ORGANIZED HEALTH CARE EDUCATION/TRAINING PROGRAM

## 2025-08-18 PROCEDURE — 85018 HEMOGLOBIN: CPT | Performed by: STUDENT IN AN ORGANIZED HEALTH CARE EDUCATION/TRAINING PROGRAM

## 2025-08-18 PROCEDURE — 258N000003 HC RX IP 258 OP 636: Performed by: STUDENT IN AN ORGANIZED HEALTH CARE EDUCATION/TRAINING PROGRAM

## 2025-08-18 PROCEDURE — 86140 C-REACTIVE PROTEIN: CPT | Performed by: STUDENT IN AN ORGANIZED HEALTH CARE EDUCATION/TRAINING PROGRAM

## 2025-08-18 PROCEDURE — 36415 COLL VENOUS BLD VENIPUNCTURE: CPT | Performed by: STUDENT IN AN ORGANIZED HEALTH CARE EDUCATION/TRAINING PROGRAM

## 2025-08-18 PROCEDURE — 85007 BL SMEAR W/DIFF WBC COUNT: CPT | Performed by: STUDENT IN AN ORGANIZED HEALTH CARE EDUCATION/TRAINING PROGRAM

## 2025-08-18 PROCEDURE — 84075 ASSAY ALKALINE PHOSPHATASE: CPT | Performed by: STUDENT IN AN ORGANIZED HEALTH CARE EDUCATION/TRAINING PROGRAM

## 2025-08-18 PROCEDURE — 83615 LACTATE (LD) (LDH) ENZYME: CPT | Performed by: STUDENT IN AN ORGANIZED HEALTH CARE EDUCATION/TRAINING PROGRAM

## 2025-08-18 PROCEDURE — G0463 HOSPITAL OUTPT CLINIC VISIT: HCPCS | Performed by: STUDENT IN AN ORGANIZED HEALTH CARE EDUCATION/TRAINING PROGRAM

## 2025-08-18 PROCEDURE — 250N000012 HC RX MED GY IP 250 OP 636 PS 637: Performed by: NURSE PRACTITIONER

## 2025-08-18 PROCEDURE — 84550 ASSAY OF BLOOD/URIC ACID: CPT | Performed by: STUDENT IN AN ORGANIZED HEALTH CARE EDUCATION/TRAINING PROGRAM

## 2025-08-18 PROCEDURE — 82550 ASSAY OF CK (CPK): CPT | Performed by: STUDENT IN AN ORGANIZED HEALTH CARE EDUCATION/TRAINING PROGRAM

## 2025-08-18 PROCEDURE — 82728 ASSAY OF FERRITIN: CPT | Performed by: STUDENT IN AN ORGANIZED HEALTH CARE EDUCATION/TRAINING PROGRAM

## 2025-08-18 PROCEDURE — 84100 ASSAY OF PHOSPHORUS: CPT | Performed by: STUDENT IN AN ORGANIZED HEALTH CARE EDUCATION/TRAINING PROGRAM

## 2025-08-18 PROCEDURE — 84153 ASSAY OF PSA TOTAL: CPT | Performed by: STUDENT IN AN ORGANIZED HEALTH CARE EDUCATION/TRAINING PROGRAM

## 2025-08-18 PROCEDURE — 82248 BILIRUBIN DIRECT: CPT | Performed by: STUDENT IN AN ORGANIZED HEALTH CARE EDUCATION/TRAINING PROGRAM

## 2025-08-18 PROCEDURE — 250N000013 HC RX MED GY IP 250 OP 250 PS 637: Performed by: NURSE PRACTITIONER

## 2025-08-18 PROCEDURE — 85045 AUTOMATED RETICULOCYTE COUNT: CPT | Performed by: STUDENT IN AN ORGANIZED HEALTH CARE EDUCATION/TRAINING PROGRAM

## 2025-08-18 PROCEDURE — 300N000004 RESEARCH KIT COLLECTION: Performed by: STUDENT IN AN ORGANIZED HEALTH CARE EDUCATION/TRAINING PROGRAM

## 2025-08-18 RX ORDER — IBUPROFEN 200 MG
400 TABLET ORAL EVERY 6 HOURS PRN
Qty: 200 TABLET | Refills: 3 | Status: SHIPPED | OUTPATIENT
Start: 2025-08-18

## 2025-08-18 RX ORDER — ACETAMINOPHEN 325 MG/1
650 TABLET ORAL ONCE
Status: COMPLETED | OUTPATIENT
Start: 2025-08-18 | End: 2025-08-18

## 2025-08-18 RX ORDER — ACETAMINOPHEN 500 MG
500 TABLET ORAL EVERY 6 HOURS
Qty: 250 TABLET | Refills: 2 | Status: SHIPPED | OUTPATIENT
Start: 2025-08-18

## 2025-08-18 RX ORDER — DEXAMETHASONE 4 MG/1
8 TABLET ORAL ONCE
Status: COMPLETED | OUTPATIENT
Start: 2025-08-18 | End: 2025-08-18

## 2025-08-18 RX ADMIN — ACETAMINOPHEN 650 MG: 325 TABLET ORAL at 08:34

## 2025-08-18 RX ADMIN — SODIUM CHLORIDE 1000 ML: 9 INJECTION, SOLUTION INTRAVENOUS at 07:58

## 2025-08-18 RX ADMIN — DEXAMETHASONE 8 MG: 4 TABLET ORAL at 08:34

## 2025-08-18 ASSESSMENT — PAIN SCALES - GENERAL: PAINLEVEL_OUTOF10: NO PAIN (0)

## 2025-08-20 LAB — TESTOST SERPL-MCNC: 309 NG/DL (ref 240–950)

## 2025-09-03 ENCOUNTER — PATIENT OUTREACH (OUTPATIENT)
Dept: ONCOLOGY | Facility: CLINIC | Age: 70
End: 2025-09-03
Payer: COMMERCIAL

## 2025-09-04 ENCOUNTER — ONCOLOGY VISIT (OUTPATIENT)
Dept: ONCOLOGY | Facility: CLINIC | Age: 70
End: 2025-09-04
Attending: STUDENT IN AN ORGANIZED HEALTH CARE EDUCATION/TRAINING PROGRAM
Payer: COMMERCIAL

## 2025-09-04 ENCOUNTER — ALLIED HEALTH/NURSE VISIT (OUTPATIENT)
Dept: ONCOLOGY | Facility: CLINIC | Age: 70
End: 2025-09-04

## 2025-09-04 VITALS
TEMPERATURE: 97.8 F | HEART RATE: 67 BPM | OXYGEN SATURATION: 97 % | RESPIRATION RATE: 16 BRPM | DIASTOLIC BLOOD PRESSURE: 62 MMHG | SYSTOLIC BLOOD PRESSURE: 101 MMHG

## 2025-09-04 VITALS
OXYGEN SATURATION: 99 % | RESPIRATION RATE: 16 BRPM | WEIGHT: 179.3 LBS | TEMPERATURE: 97.4 F | HEIGHT: 71 IN | BODY MASS INDEX: 25.1 KG/M2 | DIASTOLIC BLOOD PRESSURE: 78 MMHG | SYSTOLIC BLOOD PRESSURE: 145 MMHG | HEART RATE: 63 BPM

## 2025-09-04 DIAGNOSIS — C61 HORMONE SENSITIVE PROSTATE CANCER (H): Primary | ICD-10-CM

## 2025-09-04 DIAGNOSIS — C61 PROSTATE CANCER (H): Primary | ICD-10-CM

## 2025-09-04 DIAGNOSIS — R97.21 RISING PSA FOLLOWING TREATMENT FOR MALIGNANT NEOPLASM OF PROSTATE: ICD-10-CM

## 2025-09-04 DIAGNOSIS — Z19.1 HORMONE SENSITIVE PROSTATE CANCER (H): Primary | ICD-10-CM

## 2025-09-04 LAB
ALBUMIN SERPL BCG-MCNC: 4 G/DL (ref 3.5–5.2)
ALP SERPL-CCNC: 110 U/L (ref 40–150)
ALT SERPL W P-5'-P-CCNC: 20 U/L (ref 0–70)
ANION GAP SERPL CALCULATED.3IONS-SCNC: 14 MMOL/L (ref 7–15)
AST SERPL W P-5'-P-CCNC: 34 U/L (ref 0–45)
BASOPHILS # BLD AUTO: 0.07 10E3/UL (ref 0–0.2)
BASOPHILS NFR BLD AUTO: 1 %
BILIRUB SERPL-MCNC: 0.4 MG/DL
BILIRUBIN DIRECT (ROCHE PRO & PURE): 0.1 MG/DL (ref 0–0.45)
BUN SERPL-MCNC: 8.8 MG/DL (ref 8–23)
CALCIUM SERPL-MCNC: 9.4 MG/DL (ref 8.8–10.4)
CHLORIDE SERPL-SCNC: 96 MMOL/L (ref 98–107)
CK SERPL-CCNC: 124 U/L (ref 39–308)
CREAT SERPL-MCNC: 0.8 MG/DL (ref 0.67–1.17)
CRP SERPL-MCNC: 4.74 MG/L
EGFRCR SERPLBLD CKD-EPI 2021: >90 ML/MIN/1.73M2
EOSINOPHIL # BLD AUTO: 0.3 10E3/UL (ref 0–0.7)
EOSINOPHIL NFR BLD AUTO: 4.5 %
ERYTHROCYTE [DISTWIDTH] IN BLOOD BY AUTOMATED COUNT: 13.4 % (ref 10–15)
FERRITIN SERPL-MCNC: 1015 NG/ML (ref 31–409)
GLUCOSE SERPL-MCNC: 108 MG/DL (ref 70–99)
HCO3 SERPL-SCNC: 24 MMOL/L (ref 22–29)
HCT VFR BLD AUTO: 38 % (ref 40–53)
HGB BLD-MCNC: 13.1 G/DL (ref 13.3–17.7)
IMM GRANULOCYTES # BLD: 0.05 10E3/UL
IMM GRANULOCYTES NFR BLD: 0.7 %
LYMPHOCYTES # BLD AUTO: 0.77 10E3/UL (ref 0.8–5.3)
LYMPHOCYTES NFR BLD AUTO: 11.5 %
MAGNESIUM SERPL-MCNC: 2.1 MG/DL (ref 1.7–2.3)
MCH RBC QN AUTO: 31.7 PG (ref 26.5–33)
MCHC RBC AUTO-ENTMCNC: 34.5 G/DL (ref 31.5–36.5)
MCV RBC AUTO: 92 FL (ref 78–100)
MONOCYTES # BLD AUTO: 0.57 10E3/UL (ref 0–1.3)
MONOCYTES NFR BLD AUTO: 8.5 %
NEUTROPHILS # BLD AUTO: 4.94 10E3/UL (ref 1.6–8.3)
NEUTROPHILS NFR BLD AUTO: 73.8 %
NRBC # BLD AUTO: <0.03 10E3/UL
NRBC BLD AUTO-RTO: 0 /100
PHOSPHATE SERPL-MCNC: 3 MG/DL (ref 2.5–4.5)
PLATELET # BLD AUTO: 319 10E3/UL (ref 150–450)
POTASSIUM SERPL-SCNC: 4.5 MMOL/L (ref 3.4–5.3)
PROT SERPL-MCNC: 7.1 G/DL (ref 6.4–8.3)
RBC # BLD AUTO: 4.13 10E6/UL (ref 4.4–5.9)
RETICS # AUTO: 0.11 10E6/UL (ref 0.03–0.1)
RETICS/RBC NFR AUTO: 2.67 % (ref 0.5–2)
SODIUM SERPL-SCNC: 134 MMOL/L (ref 135–145)
URATE SERPL-MCNC: 4.1 MG/DL (ref 3.4–7)
WBC # BLD AUTO: 6.7 10E3/UL (ref 4–11)

## 2025-09-04 PROCEDURE — 85045 AUTOMATED RETICULOCYTE COUNT: CPT | Performed by: NURSE PRACTITIONER

## 2025-09-04 PROCEDURE — 80053 COMPREHEN METABOLIC PANEL: CPT | Performed by: NURSE PRACTITIONER

## 2025-09-04 PROCEDURE — 86140 C-REACTIVE PROTEIN: CPT | Performed by: NURSE PRACTITIONER

## 2025-09-04 PROCEDURE — 82728 ASSAY OF FERRITIN: CPT | Performed by: NURSE PRACTITIONER

## 2025-09-04 PROCEDURE — 85004 AUTOMATED DIFF WBC COUNT: CPT | Performed by: NURSE PRACTITIONER

## 2025-09-04 PROCEDURE — 250N000012 HC RX MED GY IP 250 OP 636 PS 637: Performed by: NURSE PRACTITIONER

## 2025-09-04 PROCEDURE — 84100 ASSAY OF PHOSPHORUS: CPT | Performed by: NURSE PRACTITIONER

## 2025-09-04 PROCEDURE — 82248 BILIRUBIN DIRECT: CPT | Performed by: NURSE PRACTITIONER

## 2025-09-04 PROCEDURE — 250N000013 HC RX MED GY IP 250 OP 250 PS 637: Performed by: NURSE PRACTITIONER

## 2025-09-04 PROCEDURE — 84550 ASSAY OF BLOOD/URIC ACID: CPT | Performed by: NURSE PRACTITIONER

## 2025-09-04 PROCEDURE — G0463 HOSPITAL OUTPT CLINIC VISIT: HCPCS | Performed by: INTERNAL MEDICINE

## 2025-09-04 PROCEDURE — 82550 ASSAY OF CK (CPK): CPT | Performed by: NURSE PRACTITIONER

## 2025-09-04 PROCEDURE — 83735 ASSAY OF MAGNESIUM: CPT | Performed by: NURSE PRACTITIONER

## 2025-09-04 PROCEDURE — 36415 COLL VENOUS BLD VENIPUNCTURE: CPT | Performed by: NURSE PRACTITIONER

## 2025-09-04 PROCEDURE — 258N000003 HC RX IP 258 OP 636: Performed by: NURSE PRACTITIONER

## 2025-09-04 RX ORDER — DEXAMETHASONE 4 MG/1
4 TABLET ORAL ONCE
Status: COMPLETED | OUTPATIENT
Start: 2025-09-04 | End: 2025-09-04

## 2025-09-04 RX ORDER — ACETAMINOPHEN 325 MG/1
650 TABLET ORAL ONCE
Status: COMPLETED | OUTPATIENT
Start: 2025-09-04 | End: 2025-09-04

## 2025-09-04 RX ADMIN — DEXAMETHASONE 4 MG: 4 TABLET ORAL at 12:29

## 2025-09-04 RX ADMIN — SODIUM CHLORIDE 250 ML: 0.9 INJECTION, SOLUTION INTRAVENOUS at 12:30

## 2025-09-04 RX ADMIN — ACETAMINOPHEN 650 MG: 325 TABLET ORAL at 12:29

## 2025-09-04 ASSESSMENT — PAIN SCALES - GENERAL: PAINLEVEL_OUTOF10: NO PAIN (0)

## (undated) DEVICE — SU PDS II 2-0 SH 27" Z317H

## (undated) DEVICE — Device

## (undated) DEVICE — SUCTION MANIFOLD NEPTUNE 2 SYS 1 PORT 702-025-000

## (undated) DEVICE — DAVINCI XI MONOPOLAR SCISSORS HOT SHEARS 8MM 470179

## (undated) DEVICE — TUBING CONMED AIRSEAL SMOKE EVAC INSUFFLATION ASM-EVAC

## (undated) DEVICE — SOL NACL 0.9% INJ 1000ML BAG 2B1324X

## (undated) DEVICE — ADH SKIN CLOSURE PREMIERPRO EXOFIN 1.0ML 3470

## (undated) DEVICE — SU VICRYL 3-0 SH 8X18" UND J864D

## (undated) DEVICE — CLIP ENDO HEMO-LOC PURPLE LG 544240

## (undated) DEVICE — PACK DAVINCI UROLOGY SBA15UDFSG

## (undated) DEVICE — CATH FOLEY 20FR 5ML SILVER COAT LATEX 0165SI20

## (undated) DEVICE — ENDO TROCAR 12MM VERSAONE BLADELESS W/STD FIX CAN NONB12STF

## (undated) DEVICE — GLOVE PROTEXIS BLUE W/NEU-THERA 6.5  2D73EB65

## (undated) DEVICE — DAVINCI XI NDL DRIVER LARGE 470006

## (undated) DEVICE — SU VICRYL 0 UR-6 27" J603H

## (undated) DEVICE — DRAIN PENROSE 0.25"X18" LATEX FREE GR201

## (undated) DEVICE — SUCTION IRR STRYKERFLOW II W/TIP 250-070-520

## (undated) DEVICE — GLOVE PROTEXIS W/NEU-THERA 7.5  2D73TE75

## (undated) DEVICE — SU MONOCRYL 4-0 PS-2 18" UND Y496G

## (undated) DEVICE — DRAIN JACKSON PRATT CHANNEL 19FR ROUND HUBLESS SIL JP-2230

## (undated) DEVICE — NDL INSUFFLATION 120MM VERRES 172015

## (undated) DEVICE — SU VICRYL 0 CT-1 27" J340H

## (undated) DEVICE — GLOVE PROTEXIS BLUE W/NEU-THERA 7.5  2D73EB75

## (undated) DEVICE — LINEN TOWEL PACK X5 5464

## (undated) DEVICE — SURGICEL POWDER ABSORBABLE HEMOSTAT 3GM 3013SP

## (undated) DEVICE — DAVINCI XI DRAPE COLUMN 470341

## (undated) DEVICE — DAVINCI XI DRAPE ARM 470015

## (undated) DEVICE — ESU GROUND PAD ADULT W/CORD E7507

## (undated) DEVICE — SOL WATER IRRIG 1000ML BOTTLE 2F7114

## (undated) DEVICE — SU WND CLOSURE VLOC 90 ABS 3-0 VIOLET 6" CV-23 VLOCM0804

## (undated) DEVICE — SU VICRYL 3-0 SH 27" J316H

## (undated) DEVICE — DRAPE IOBAN INCISE 13X13" 6640EZ

## (undated) DEVICE — PROTECTOR ARM ONE-STEP TRENDELENBURG 40418

## (undated) DEVICE — FCP BIOPSY RADIAL JAW 4 JUMBO 3.2MM CHANNEL M00513370

## (undated) DEVICE — BLADE CLIPPER SGL USE 9680

## (undated) DEVICE — ENDO POUCH UNIV RETRIEVAL SYSTEM INZII 10MM CD001

## (undated) DEVICE — SU SILK 3-0 TIE 12X30" A304H

## (undated) DEVICE — KIT PATIENT POSITIONING PIGAZZI LATEX FREE 40580

## (undated) DEVICE — PACK MINOR CUSTOM ASC

## (undated) DEVICE — DAVINCI XI SEAL UNIVERSAL 5-8MM 470361

## (undated) DEVICE — SU VICRYL 4-0 RB-1 27" J304

## (undated) DEVICE — SPECIMEN CONTAINER 3OZ W/FORMALIN 59901

## (undated) DEVICE — TUBING SUCTION 12"X1/4" N612

## (undated) DEVICE — TROCAR AIRSEAL BLADELESS 12X120MM IAS12-120

## (undated) DEVICE — DAVINCI HOT SHEARS TIP COVER  400180

## (undated) DEVICE — SOL NACL 0.9% IRRIG 500ML BOTTLE 2F7123

## (undated) DEVICE — PREP CHLORAPREP 26ML TINTED ORANGE  260815

## (undated) DEVICE — SU MONOCRYL 4-0 PS-2 27" UND Y426H

## (undated) DEVICE — SPONGE KITTNER 30-101

## (undated) DEVICE — DRAPE LAP TRANSVERSE 29421

## (undated) DEVICE — SU SILK 2-0 FSL 18" 677G

## (undated) DEVICE — GOWN IMPERVIOUS 2XL BLUE

## (undated) DEVICE — SU WND CLOSURE V-LOC 3-0 CV-23 6" VLOCM1904

## (undated) DEVICE — DRAIN JACKSON PRATT RESERVOIR 100ML SU130-1305

## (undated) DEVICE — KIT ENDO TURNOVER/PROCEDURE CARRY-ON 101822

## (undated) DEVICE — SOL NACL 0.9% IRRIG 1000ML BOTTLE 2F7124

## (undated) DEVICE — DAVINCI XI FCP BIPOLAR FENESTRATED 470205

## (undated) RX ORDER — CEFAZOLIN SODIUM 1 G/3ML
INJECTION, POWDER, FOR SOLUTION INTRAMUSCULAR; INTRAVENOUS
Status: DISPENSED
Start: 2020-04-29

## (undated) RX ORDER — KETOROLAC TROMETHAMINE 30 MG/ML
INJECTION, SOLUTION INTRAMUSCULAR; INTRAVENOUS
Status: DISPENSED
Start: 2022-08-30

## (undated) RX ORDER — HEPARIN SODIUM,PORCINE 10 UNIT/ML
VIAL (ML) INTRAVENOUS
Status: DISPENSED
Start: 2021-03-09

## (undated) RX ORDER — PROPOFOL 10 MG/ML
INJECTION, EMULSION INTRAVENOUS
Status: DISPENSED
Start: 2022-08-30

## (undated) RX ORDER — HYDROMORPHONE HYDROCHLORIDE 1 MG/ML
INJECTION, SOLUTION INTRAMUSCULAR; INTRAVENOUS; SUBCUTANEOUS
Status: DISPENSED
Start: 2020-04-29

## (undated) RX ORDER — BUPIVACAINE HYDROCHLORIDE 2.5 MG/ML
INJECTION, SOLUTION EPIDURAL; INFILTRATION; INTRACAUDAL
Status: DISPENSED
Start: 2020-04-29

## (undated) RX ORDER — ACETAMINOPHEN 325 MG/1
TABLET ORAL
Status: DISPENSED
Start: 2022-08-30

## (undated) RX ORDER — ONDANSETRON 2 MG/ML
INJECTION INTRAMUSCULAR; INTRAVENOUS
Status: DISPENSED
Start: 2021-03-09

## (undated) RX ORDER — PROPOFOL 10 MG/ML
INJECTION, EMULSION INTRAVENOUS
Status: DISPENSED
Start: 2020-04-29

## (undated) RX ORDER — FENTANYL CITRATE 50 UG/ML
INJECTION, SOLUTION INTRAMUSCULAR; INTRAVENOUS
Status: DISPENSED
Start: 2021-03-09

## (undated) RX ORDER — ONDANSETRON 2 MG/ML
INJECTION INTRAMUSCULAR; INTRAVENOUS
Status: DISPENSED
Start: 2020-04-29

## (undated) RX ORDER — LIDOCAINE HYDROCHLORIDE 20 MG/ML
INJECTION, SOLUTION EPIDURAL; INFILTRATION; INTRACAUDAL; PERINEURAL
Status: DISPENSED
Start: 2022-08-30

## (undated) RX ORDER — ONDANSETRON 2 MG/ML
INJECTION INTRAMUSCULAR; INTRAVENOUS
Status: DISPENSED
Start: 2022-08-30

## (undated) RX ORDER — FENTANYL CITRATE 50 UG/ML
INJECTION, SOLUTION INTRAMUSCULAR; INTRAVENOUS
Status: DISPENSED
Start: 2020-04-29

## (undated) RX ORDER — CEFAZOLIN SODIUM 2 G/50ML
SOLUTION INTRAVENOUS
Status: DISPENSED
Start: 2022-08-30

## (undated) RX ORDER — LIDOCAINE HYDROCHLORIDE 10 MG/ML
INJECTION, SOLUTION EPIDURAL; INFILTRATION; INTRACAUDAL; PERINEURAL
Status: DISPENSED
Start: 2022-08-30

## (undated) RX ORDER — DEXAMETHASONE SODIUM PHOSPHATE 4 MG/ML
INJECTION, SOLUTION INTRA-ARTICULAR; INTRALESIONAL; INTRAMUSCULAR; INTRAVENOUS; SOFT TISSUE
Status: DISPENSED
Start: 2022-08-30

## (undated) RX ORDER — LIDOCAINE HYDROCHLORIDE 20 MG/ML
INJECTION, SOLUTION EPIDURAL; INFILTRATION; INTRACAUDAL; PERINEURAL
Status: DISPENSED
Start: 2020-04-29

## (undated) RX ORDER — OXYCODONE HYDROCHLORIDE 5 MG/1
TABLET ORAL
Status: DISPENSED
Start: 2022-08-30

## (undated) RX ORDER — EPHEDRINE SULFATE 50 MG/ML
INJECTION, SOLUTION INTRAMUSCULAR; INTRAVENOUS; SUBCUTANEOUS
Status: DISPENSED
Start: 2022-08-30

## (undated) RX ORDER — HEPARIN SODIUM (PORCINE) LOCK FLUSH IV SOLN 100 UNIT/ML 100 UNIT/ML
SOLUTION INTRAVENOUS
Status: DISPENSED
Start: 2021-03-09

## (undated) RX ORDER — BUPIVACAINE HYDROCHLORIDE 2.5 MG/ML
INJECTION, SOLUTION EPIDURAL; INFILTRATION; INTRACAUDAL
Status: DISPENSED
Start: 2022-08-30

## (undated) RX ORDER — NEOSTIGMINE METHYLSULFATE 1 MG/ML
VIAL (ML) INJECTION
Status: DISPENSED
Start: 2020-04-29

## (undated) RX ORDER — HEPARIN SODIUM 5000 [USP'U]/.5ML
INJECTION, SOLUTION INTRAVENOUS; SUBCUTANEOUS
Status: DISPENSED
Start: 2020-04-29

## (undated) RX ORDER — DIPHENHYDRAMINE HYDROCHLORIDE 50 MG/ML
INJECTION INTRAMUSCULAR; INTRAVENOUS
Status: DISPENSED
Start: 2021-03-09

## (undated) RX ORDER — KETOROLAC TROMETHAMINE 30 MG/ML
INJECTION, SOLUTION INTRAMUSCULAR; INTRAVENOUS
Status: DISPENSED
Start: 2020-04-29

## (undated) RX ORDER — CEFAZOLIN SODIUM 2 G/100ML
INJECTION, SOLUTION INTRAVENOUS
Status: DISPENSED
Start: 2020-04-29

## (undated) RX ORDER — DEXAMETHASONE SODIUM PHOSPHATE 4 MG/ML
INJECTION, SOLUTION INTRA-ARTICULAR; INTRALESIONAL; INTRAMUSCULAR; INTRAVENOUS; SOFT TISSUE
Status: DISPENSED
Start: 2020-04-29

## (undated) RX ORDER — GLYCOPYRROLATE 0.2 MG/ML
INJECTION, SOLUTION INTRAMUSCULAR; INTRAVENOUS
Status: DISPENSED
Start: 2020-04-29

## (undated) RX ORDER — FENTANYL CITRATE 50 UG/ML
INJECTION, SOLUTION INTRAMUSCULAR; INTRAVENOUS
Status: DISPENSED
Start: 2022-08-30